# Patient Record
Sex: FEMALE | Race: WHITE | NOT HISPANIC OR LATINO | Employment: FULL TIME | URBAN - NONMETROPOLITAN AREA
[De-identification: names, ages, dates, MRNs, and addresses within clinical notes are randomized per-mention and may not be internally consistent; named-entity substitution may affect disease eponyms.]

---

## 2020-02-13 ENCOUNTER — OFFICE VISIT CONVERTED (OUTPATIENT)
Dept: FAMILY MEDICINE CLINIC | Facility: CLINIC | Age: 30
End: 2020-02-13
Attending: FAMILY MEDICINE

## 2020-03-06 ENCOUNTER — HOSPITAL ENCOUNTER (OUTPATIENT)
Dept: GENERAL RADIOLOGY | Facility: HOSPITAL | Age: 30
Discharge: HOME | End: 2020-03-06
Attending: NURSE PRACTITIONER

## 2020-03-06 ENCOUNTER — OFFICE VISIT CONVERTED (OUTPATIENT)
Dept: FAMILY MEDICINE CLINIC | Facility: CLINIC | Age: 30
End: 2020-03-06
Attending: NURSE PRACTITIONER

## 2020-03-06 LAB
ALBUMIN SERPL-MCNC: 4.5 G/DL (ref 3.5–5)
ALBUMIN/GLOB SERPL: 1.6 {RATIO} (ref 1.4–2.6)
ALP SERPL-CCNC: 43 U/L (ref 42–98)
ALT SERPL-CCNC: 14 U/L (ref 10–40)
ANION GAP SERPL CALC-SCNC: 16 MMOL/L (ref 8–19)
AST SERPL-CCNC: 15 U/L (ref 15–50)
BASOPHILS # BLD AUTO: 0.05 10*3/UL (ref 0–0.2)
BASOPHILS NFR BLD AUTO: 0.5 % (ref 0–3)
BILIRUB SERPL-MCNC: 0.22 MG/DL (ref 0.2–1.3)
BUN SERPL-MCNC: 8 MG/DL (ref 5–25)
BUN/CREAT SERPL: 13 {RATIO} (ref 6–20)
C TRACH RRNA CVX QL NAA+PROBE: NOT DETECTED
CALCIUM SERPL-MCNC: 9.6 MG/DL (ref 8.7–10.4)
CHLORIDE SERPL-SCNC: 102 MMOL/L (ref 99–111)
CHOLEST SERPL-MCNC: 219 MG/DL (ref 107–200)
CHOLEST/HDLC SERPL: 5.8 {RATIO} (ref 3–6)
CONV ABS IMM GRAN: 0.02 10*3/UL (ref 0–0.2)
CONV CO2: 24 MMOL/L (ref 22–32)
CONV HIV-1/ HIV-2: NEGATIVE
CONV IMMATURE GRAN: 0.2 % (ref 0–1.8)
CONV TOTAL PROTEIN: 7.4 G/DL (ref 6.3–8.2)
CREAT UR-MCNC: 0.63 MG/DL (ref 0.5–0.9)
DEPRECATED RDW RBC AUTO: 45.3 FL (ref 36.4–46.3)
EOSINOPHIL # BLD AUTO: 0.11 10*3/UL (ref 0–0.7)
EOSINOPHIL # BLD AUTO: 1 % (ref 0–7)
ERYTHROCYTE [DISTWIDTH] IN BLOOD BY AUTOMATED COUNT: 12.2 % (ref 11.7–14.4)
GFR SERPLBLD BASED ON 1.73 SQ M-ARVRAT: >60 ML/MIN/{1.73_M2}
GLOBULIN UR ELPH-MCNC: 2.9 G/DL (ref 2–3.5)
GLUCOSE SERPL-MCNC: 87 MG/DL (ref 65–99)
HCT VFR BLD AUTO: 41.9 % (ref 37–47)
HDLC SERPL-MCNC: 38 MG/DL (ref 40–60)
HGB BLD-MCNC: 13.7 G/DL (ref 12–16)
LDLC SERPL CALC-MCNC: 155 MG/DL (ref 70–100)
LYMPHOCYTES # BLD AUTO: 4.69 10*3/UL (ref 1–5)
LYMPHOCYTES NFR BLD AUTO: 43.7 % (ref 20–45)
MCH RBC QN AUTO: 32.8 PG (ref 27–31)
MCHC RBC AUTO-ENTMCNC: 32.7 G/DL (ref 33–37)
MCV RBC AUTO: 100.2 FL (ref 81–99)
MONOCYTES # BLD AUTO: 0.81 10*3/UL (ref 0.2–1.2)
MONOCYTES NFR BLD AUTO: 7.5 % (ref 3–10)
N GONORRHOEA DNA SPEC QL NAA+PROBE: NOT DETECTED
NEUTROPHILS # BLD AUTO: 5.05 10*3/UL (ref 2–8)
NEUTROPHILS NFR BLD AUTO: 47.1 % (ref 30–85)
NRBC CBCN: 0 % (ref 0–0.7)
OSMOLALITY SERPL CALC.SUM OF ELEC: 284 MOSM/KG (ref 273–304)
PLATELET # BLD AUTO: 292 10*3/UL (ref 130–400)
PMV BLD AUTO: 11.5 FL (ref 9.4–12.3)
POTASSIUM SERPL-SCNC: 4.1 MMOL/L (ref 3.5–5.3)
RBC # BLD AUTO: 4.18 10*6/UL (ref 4.2–5.4)
RPR SER QL: NORMAL
SODIUM SERPL-SCNC: 138 MMOL/L (ref 135–147)
TRIGL SERPL-MCNC: 130 MG/DL (ref 40–150)
TSH SERPL-ACNC: 1.78 M[IU]/L (ref 0.27–4.2)
VLDLC SERPL-MCNC: 26 MG/DL (ref 5–37)
WBC # BLD AUTO: 10.73 10*3/UL (ref 4.8–10.8)

## 2020-03-09 LAB
CONV HEPATITIS B SURFACE AG W CONFIRMATION RE: NEGATIVE
CONV HEPATITIS COMMENT: NORMAL
HBV CORE AB SER DONR QL IA: NEGATIVE
HBV CORE IGM SERPL QL IA: NEGATIVE
HBV E AB SERPL QL IA: NEGATIVE
HBV E AG SERPL QL IA: NEGATIVE
HBV SURFACE AB SER QL: REACTIVE
HCV AB S/CO SERPL IA: <0.1 S/CO RATIO (ref 0–0.9)

## 2020-03-10 LAB
CONV LAST MENSTURAL PERIOD: NORMAL
HPV HYBRID CAPTURE HIGH RISK: NORMAL
HPV I/H RISK 4 DNA CVX QL PROBE+SIG AMP: NEGATIVE
HSV1 DNA SPEC QL NAA+PROBE: NEGATIVE
HSV2 DNA SPEC QL NAA+PROBE: NEGATIVE
SPECIMEN SOURCE: NORMAL
SPECIMEN SOURCE: NORMAL
THIN PREP CVX: NORMAL

## 2021-05-15 VITALS
HEIGHT: 68 IN | DIASTOLIC BLOOD PRESSURE: 81 MMHG | WEIGHT: 189.19 LBS | HEART RATE: 102 BPM | RESPIRATION RATE: 20 BRPM | SYSTOLIC BLOOD PRESSURE: 127 MMHG | BODY MASS INDEX: 28.67 KG/M2 | OXYGEN SATURATION: 100 % | TEMPERATURE: 98.2 F

## 2021-05-15 VITALS
WEIGHT: 195 LBS | OXYGEN SATURATION: 100 % | TEMPERATURE: 98.2 F | SYSTOLIC BLOOD PRESSURE: 133 MMHG | HEART RATE: 100 BPM | HEIGHT: 68 IN | RESPIRATION RATE: 18 BRPM | DIASTOLIC BLOOD PRESSURE: 80 MMHG | BODY MASS INDEX: 29.55 KG/M2

## 2023-03-19 ENCOUNTER — APPOINTMENT (OUTPATIENT)
Dept: GENERAL RADIOLOGY | Facility: HOSPITAL | Age: 33
End: 2023-03-19
Payer: COMMERCIAL

## 2023-03-19 LAB
ALBUMIN SERPL-MCNC: 4.8 G/DL (ref 3.5–5.2)
ALBUMIN/GLOB SERPL: 1.6 G/DL
ALP SERPL-CCNC: 44 U/L (ref 39–117)
ALT SERPL W P-5'-P-CCNC: 19 U/L (ref 1–33)
ANION GAP SERPL CALCULATED.3IONS-SCNC: 10.3 MMOL/L (ref 5–15)
AST SERPL-CCNC: 17 U/L (ref 1–32)
BASOPHILS # BLD AUTO: 0.03 10*3/MM3 (ref 0–0.2)
BASOPHILS NFR BLD AUTO: 0.3 % (ref 0–1.5)
BILIRUB SERPL-MCNC: 0.3 MG/DL (ref 0–1.2)
BUN SERPL-MCNC: 9 MG/DL (ref 6–20)
BUN/CREAT SERPL: 15.5 (ref 7–25)
CALCIUM SPEC-SCNC: 9.9 MG/DL (ref 8.6–10.5)
CHLORIDE SERPL-SCNC: 102 MMOL/L (ref 98–107)
CO2 SERPL-SCNC: 25.7 MMOL/L (ref 22–29)
CREAT SERPL-MCNC: 0.58 MG/DL (ref 0.57–1)
DEPRECATED RDW RBC AUTO: 41.8 FL (ref 37–54)
EGFRCR SERPLBLD CKD-EPI 2021: 122 ML/MIN/1.73
EOSINOPHIL # BLD AUTO: 0.01 10*3/MM3 (ref 0–0.4)
EOSINOPHIL NFR BLD AUTO: 0.1 % (ref 0.3–6.2)
ERYTHROCYTE [DISTWIDTH] IN BLOOD BY AUTOMATED COUNT: 11.8 % (ref 12.3–15.4)
GLOBULIN UR ELPH-MCNC: 3 GM/DL
GLUCOSE SERPL-MCNC: 135 MG/DL (ref 65–99)
HCT VFR BLD AUTO: 40.1 % (ref 34–46.6)
HGB BLD-MCNC: 13.7 G/DL (ref 12–15.9)
HOLD SPECIMEN: NORMAL
HOLD SPECIMEN: NORMAL
IMM GRANULOCYTES # BLD AUTO: 0.01 10*3/MM3 (ref 0–0.05)
IMM GRANULOCYTES NFR BLD AUTO: 0.1 % (ref 0–0.5)
LIPASE SERPL-CCNC: 28 U/L (ref 13–60)
LYMPHOCYTES # BLD AUTO: 2.5 10*3/MM3 (ref 0.7–3.1)
LYMPHOCYTES NFR BLD AUTO: 25.4 % (ref 19.6–45.3)
MAGNESIUM SERPL-MCNC: 1.9 MG/DL (ref 1.6–2.6)
MCH RBC QN AUTO: 33.1 PG (ref 26.6–33)
MCHC RBC AUTO-ENTMCNC: 34.2 G/DL (ref 31.5–35.7)
MCV RBC AUTO: 96.9 FL (ref 79–97)
MONOCYTES # BLD AUTO: 0.76 10*3/MM3 (ref 0.1–0.9)
MONOCYTES NFR BLD AUTO: 7.7 % (ref 5–12)
NEUTROPHILS NFR BLD AUTO: 6.52 10*3/MM3 (ref 1.7–7)
NEUTROPHILS NFR BLD AUTO: 66.4 % (ref 42.7–76)
NRBC BLD AUTO-RTO: 0 /100 WBC (ref 0–0.2)
PLATELET # BLD AUTO: 333 10*3/MM3 (ref 140–450)
PMV BLD AUTO: 10.6 FL (ref 6–12)
POTASSIUM SERPL-SCNC: 4 MMOL/L (ref 3.5–5.2)
PROT SERPL-MCNC: 7.8 G/DL (ref 6–8.5)
RBC # BLD AUTO: 4.14 10*6/MM3 (ref 3.77–5.28)
SODIUM SERPL-SCNC: 138 MMOL/L (ref 136–145)
WBC NRBC COR # BLD: 9.83 10*3/MM3 (ref 3.4–10.8)
WHOLE BLOOD HOLD COAG: NORMAL
WHOLE BLOOD HOLD SPECIMEN: NORMAL

## 2023-03-19 PROCEDURE — 71045 X-RAY EXAM CHEST 1 VIEW: CPT

## 2023-03-19 PROCEDURE — 83880 ASSAY OF NATRIURETIC PEPTIDE: CPT

## 2023-03-19 PROCEDURE — 83690 ASSAY OF LIPASE: CPT

## 2023-03-19 PROCEDURE — 93010 ELECTROCARDIOGRAM REPORT: CPT | Performed by: INTERNAL MEDICINE

## 2023-03-19 PROCEDURE — 80053 COMPREHEN METABOLIC PANEL: CPT

## 2023-03-19 PROCEDURE — 83735 ASSAY OF MAGNESIUM: CPT

## 2023-03-19 PROCEDURE — 84484 ASSAY OF TROPONIN QUANT: CPT

## 2023-03-19 PROCEDURE — 36415 COLL VENOUS BLD VENIPUNCTURE: CPT | Performed by: EMERGENCY MEDICINE

## 2023-03-19 PROCEDURE — 84702 CHORIONIC GONADOTROPIN TEST: CPT | Performed by: EMERGENCY MEDICINE

## 2023-03-19 PROCEDURE — 84443 ASSAY THYROID STIM HORMONE: CPT | Performed by: NURSE PRACTITIONER

## 2023-03-19 PROCEDURE — 99283 EMERGENCY DEPT VISIT LOW MDM: CPT

## 2023-03-19 PROCEDURE — 93005 ELECTROCARDIOGRAM TRACING: CPT | Performed by: EMERGENCY MEDICINE

## 2023-03-19 PROCEDURE — 85379 FIBRIN DEGRADATION QUANT: CPT | Performed by: EMERGENCY MEDICINE

## 2023-03-19 PROCEDURE — 85025 COMPLETE CBC W/AUTO DIFF WBC: CPT | Performed by: EMERGENCY MEDICINE

## 2023-03-19 PROCEDURE — 93005 ELECTROCARDIOGRAM TRACING: CPT

## 2023-03-19 PROCEDURE — 84439 ASSAY OF FREE THYROXINE: CPT | Performed by: NURSE PRACTITIONER

## 2023-03-19 RX ORDER — ASPIRIN 81 MG/1
324 TABLET, CHEWABLE ORAL ONCE
Status: DISCONTINUED | OUTPATIENT
Start: 2023-03-19 | End: 2023-03-20 | Stop reason: HOSPADM

## 2023-03-19 RX ORDER — SODIUM CHLORIDE 0.9 % (FLUSH) 0.9 %
10 SYRINGE (ML) INJECTION AS NEEDED
Status: DISCONTINUED | OUTPATIENT
Start: 2023-03-19 | End: 2023-03-20 | Stop reason: HOSPADM

## 2023-03-20 ENCOUNTER — HOSPITAL ENCOUNTER (EMERGENCY)
Facility: HOSPITAL | Age: 33
Discharge: HOME | End: 2023-03-20
Attending: EMERGENCY MEDICINE | Admitting: EMERGENCY MEDICINE
Payer: COMMERCIAL

## 2023-03-20 VITALS
HEART RATE: 118 BPM | BODY MASS INDEX: 31.39 KG/M2 | HEIGHT: 67 IN | OXYGEN SATURATION: 100 % | WEIGHT: 200 LBS | DIASTOLIC BLOOD PRESSURE: 82 MMHG | RESPIRATION RATE: 20 BRPM | TEMPERATURE: 98.8 F | SYSTOLIC BLOOD PRESSURE: 128 MMHG

## 2023-03-20 DIAGNOSIS — R00.2 PALPITATIONS: Primary | ICD-10-CM

## 2023-03-20 LAB
D DIMER PPP FEU-MCNC: <0.27 MCGFEU/ML (ref 0–0.5)
GEN 5 2HR TROPONIN T REFLEX: <6 NG/L
HCG INTACT+B SERPL-ACNC: <0.5 MIU/ML
NT-PROBNP SERPL-MCNC: <36 PG/ML (ref 0–450)
T4 FREE SERPL-MCNC: 1.11 NG/DL (ref 0.93–1.7)
TROPONIN T DELTA: NORMAL
TROPONIN T SERPL HS-MCNC: <6 NG/L
TSH SERPL DL<=0.05 MIU/L-ACNC: 1.13 UIU/ML (ref 0.27–4.2)

## 2023-03-20 PROCEDURE — 93005 ELECTROCARDIOGRAM TRACING: CPT | Performed by: EMERGENCY MEDICINE

## 2023-03-20 PROCEDURE — 93010 ELECTROCARDIOGRAM REPORT: CPT | Performed by: INTERNAL MEDICINE

## 2023-03-20 PROCEDURE — 84484 ASSAY OF TROPONIN QUANT: CPT | Performed by: EMERGENCY MEDICINE

## 2023-03-20 RX ORDER — METOPROLOL SUCCINATE 25 MG/1
25 TABLET, EXTENDED RELEASE ORAL DAILY
Qty: 30 TABLET | Refills: 0 | Status: SHIPPED | OUTPATIENT
Start: 2023-03-20 | End: 2023-03-22

## 2023-03-20 RX ADMIN — SODIUM CHLORIDE 1000 ML: 9 INJECTION, SOLUTION INTRAVENOUS at 01:44

## 2023-03-20 NOTE — ED PROVIDER NOTES
"Time: 1:34 AM EDT  Date of encounter:  3/19/2023  Independent Historian/Clinical History and Information was obtained by:   Patient  Chief Complaint: palpitations    History is limited by: N/A    History of Present Illness:  Patient is a 32 y.o. year old female who presents to the emergency department for evaluation of intermittent palpitations.  Patient states this started yesterday.  She had several episodes of palpitation lasting for few seconds at a time.  She reports some chest discomfort with that but denies any chest pain.  No shortness of breath.  No lower extremity edema.  No history of DVT/PE.  She denies drinking any caffeinated beverages.  She denies any drug use.  No other complaints.      HPI    Patient Care Team  Primary Care Provider: Halley Albrecht DO    Past Medical History:     Allergies   Allergen Reactions   • Erythromycin Hives     No past medical history on file.  No past surgical history on file.  No family history on file.    Home Medications:  Prior to Admission medications    Not on File        Social History:          Review of Systems:  Review of Systems   Constitutional: Negative for chills and fever.   HENT: Negative for congestion, ear pain and sore throat.    Eyes: Negative for pain.   Respiratory: Negative for cough, chest tightness and shortness of breath.    Cardiovascular: Positive for palpitations. Negative for chest pain.   Gastrointestinal: Negative for abdominal pain, diarrhea, nausea and vomiting.   Genitourinary: Negative for flank pain and hematuria.   Musculoskeletal: Negative for joint swelling.   Skin: Negative for pallor.   Neurological: Negative for seizures and headaches.   All other systems reviewed and are negative.       Physical Exam:  /82 (Patient Position: Lying)   Pulse 118   Temp 98.8 °F (37.1 °C) (Oral)   Resp 20   Ht 170.2 cm (67\")   Wt 90.7 kg (200 lb)   SpO2 100%   BMI 31.32 kg/m²     Physical Exam  Constitutional:       Appearance: Normal " appearance.   HENT:      Head: Normocephalic and atraumatic.      Nose: Nose normal.      Mouth/Throat:      Mouth: Mucous membranes are moist.   Eyes:      Extraocular Movements: Extraocular movements intact.      Conjunctiva/sclera: Conjunctivae normal.      Pupils: Pupils are equal, round, and reactive to light.   Cardiovascular:      Rate and Rhythm: Regular rhythm. Tachycardia present.      Pulses: Normal pulses.      Heart sounds: Normal heart sounds.   Pulmonary:      Effort: Pulmonary effort is normal.      Breath sounds: Normal breath sounds.   Abdominal:      General: There is no distension.      Palpations: Abdomen is soft.      Tenderness: There is no abdominal tenderness.   Musculoskeletal:         General: Normal range of motion.      Cervical back: Normal range of motion.   Skin:     General: Skin is warm and dry.      Capillary Refill: Capillary refill takes less than 2 seconds.   Neurological:      General: No focal deficit present.      Mental Status: She is alert and oriented to person, place, and time. Mental status is at baseline.   Psychiatric:         Mood and Affect: Mood normal.         Behavior: Behavior normal.                  Procedures:  Procedures      Medical Decision Making:      Comorbidities that affect care:    Smoking    External Notes reviewed:    Previous Clinic Note: Patient seen by gynecology 3 years ago for annual exam      The following orders were placed and all results were independently analyzed by me:  Orders Placed This Encounter   Procedures   • XR Chest 1 View   • [place] Draw   • High Sensitivity Troponin T   • Comprehensive Metabolic Panel   • Lipase   • BNP   • Magnesium   • CBC Auto Differential   • TSH   • T4, Free   • High Sensitivity Troponin T 2Hr   • hCG, Quantitative, Pregnancy   • D-dimer, Quantitative   • ECG 12 Lead Rhythm Change   • CBC & Differential   • Green Top (Gel)   • Lavender Top   • Gold Top - SST   • Light Blue Top       Medications Given in  the Emergency Department:  Medications   sodium chloride 0.9 % bolus 1,000 mL (0 mL Intravenous Stopped 3/20/23 0253)        ED Course:    ED Course as of 03/20/23 0631   Mon Mar 20, 2023   0629 ECG 12 Lead ED Triage Standing Order; Chest Pain  Tachycardia with rate of 186.  No acute ST elevation.  Initial part of EKG showed possible SVT but this converted to sinus tachycardia no previous available for comparison.  EKG interpreted by me. [LD]   0630 ECG 12 Lead Rhythm Change  Sinus tachycardia with a rate of 112.  No acute ST elevation.  Normal SC and QTc interval.  Change when compared to previous.  EKG interpreted by me. [LD]      ED Course User Index  [LD] Kevin Calero MD       Labs:    Lab Results (last 24 hours)     Procedure Component Value Units Date/Time    High Sensitivity Troponin T [[id number]]  (Normal) Collected: 03/19/23 2255    Specimen: Blood Updated: 03/20/23 0003     HS Troponin T <6 ng/L     Narrative:      High Sensitive Troponin T Reference Range:  <10.0 ng/L- Negative Female for AMI  <15.0 ng/L- Negative Male for AMI  >=10 - Abnormal Female indicating possible myocardial injury.  >=15 - Abnormal Male indicating possible myocardial injury.   Clinicians would have to utilize clinical acumen, EKG, Troponin, and serial changes to determine if it is an Acute Myocardial Infarction or myocardial injury due to an underlying chronic condition.         CBC & Differential [[id number]]  (Abnormal) Collected: 03/19/23 2255    Specimen: Blood Updated: 03/19/23 2331    Narrative:      The following orders were created for panel order CBC & Differential.  Procedure                               Abnormality         Status                     ---------                               -----------         ------                     CBC Auto Differential[[id number]]        Abnormal            Final result                 Please view results for these tests on the individual orders.    Comprehensive Metabolic  Panel [[id number]]  (Abnormal) Collected: 03/19/23 2255    Specimen: Blood Updated: 03/19/23 2351     Glucose 135 mg/dL      BUN 9 mg/dL      Creatinine 0.58 mg/dL      Sodium 138 mmol/L      Potassium 4.0 mmol/L      Chloride 102 mmol/L      CO2 25.7 mmol/L      Calcium 9.9 mg/dL      Total Protein 7.8 g/dL      Albumin 4.8 g/dL      ALT (SGPT) 19 U/L      AST (SGOT) 17 U/L      Alkaline Phosphatase 44 U/L      Total Bilirubin 0.3 mg/dL      Globulin 3.0 gm/dL      A/G Ratio 1.6 g/dL      BUN/Creatinine Ratio 15.5     Anion Gap 10.3 mmol/L      eGFR 122.0 mL/min/1.73     Narrative:      GFR Normal >60  Chronic Kidney Disease <60  Kidney Failure <15      Lipase [[id number]]  (Normal) Collected: 03/19/23 2255    Specimen: Blood Updated: 03/19/23 2351     Lipase 28 U/L     BNP [[id number]]  (Normal) Collected: 03/19/23 2255    Specimen: Blood Updated: 03/20/23 0003     proBNP <36.0 pg/mL     Narrative:      Among patients with dyspnea, NT-proBNP is highly sensitive for the detection of acute congestive heart failure. In addition NT-proBNP of <300 pg/ml effectively rules out acute congestive heart failure with 99% negative predictive value.    Results may be falsely decreased if patient taking Biotin.      Magnesium [[id number]]  (Normal) Collected: 03/19/23 2255    Specimen: Blood Updated: 03/19/23 2351     Magnesium 1.9 mg/dL     CBC Auto Differential [[id number]]  (Abnormal) Collected: 03/19/23 2255    Specimen: Blood Updated: 03/19/23 2331     WBC 9.83 10*3/mm3      RBC 4.14 10*6/mm3      Hemoglobin 13.7 g/dL      Hematocrit 40.1 %      MCV 96.9 fL      MCH 33.1 pg      MCHC 34.2 g/dL      RDW 11.8 %      RDW-SD 41.8 fl      MPV 10.6 fL      Platelets 333 10*3/mm3      Neutrophil % 66.4 %      Lymphocyte % 25.4 %      Monocyte % 7.7 %      Eosinophil % 0.1 %      Basophil % 0.3 %      Immature Grans % 0.1 %      Neutrophils, Absolute 6.52 10*3/mm3      Lymphocytes, Absolute 2.50 10*3/mm3      Monocytes, Absolute 0.76  10*3/mm3      Eosinophils, Absolute 0.01 10*3/mm3      Basophils, Absolute 0.03 10*3/mm3      Immature Grans, Absolute 0.01 10*3/mm3      nRBC 0.0 /100 WBC     TSH [[id number]]  (Normal) Collected: 03/19/23 2255    Specimen: Blood Updated: 03/20/23 0003     TSH 1.130 uIU/mL     T4, Free [[id number]]  (Normal) Collected: 03/19/23 2255    Specimen: Blood Updated: 03/20/23 0004     Free T4 1.11 ng/dL     Narrative:      Results may be falsely increased if patient taking Biotin.      hCG, Quantitative, Pregnancy [[id number]] Collected: 03/19/23 2255    Specimen: Blood Updated: 03/20/23 0124     HCG Quantitative <0.50 mIU/mL     Narrative:      HCG Ranges by Gestational Age    Females - non-pregnant premenopausal   </= 1mIU/mL HCG  Females - postmenopausal               </= 7mIU/mL HCG    3 Weeks       5.4   -      72 mIU/mL  4 Weeks      10.2   -     708 mIU/mL  5 Weeks       217   -   8,245 mIU/mL  6 Weeks       152   -  32,177 mIU/mL  7 Weeks     4,059   - 153,767 mIU/mL  8 Weeks    31,366   - 149,094 mIU/mL  9 Weeks    59,109   - 135,901 mIU/mL  10 Weeks   44,186   - 170,409 mIU/mL  12 Weeks   27,107   - 201,615 mIU/mL  14 Weeks   24,302   -  93,646 mIU/mL  15 Weeks   12,540   -  69,747 mIU/mL  16 Weeks    8,904   -  55,332 mIU/mL  17 Weeks    8,240   -  51,793 mIU/mL  18 Weeks    9,649   -  55,271 mIU/mL      D-dimer, Quantitative [[id number]]  (Normal) Collected: 03/19/23 2255    Specimen: Blood Updated: 03/20/23 0144     D-Dimer, Quantitative <0.27 MCGFEU/mL     Narrative:      According to the assay 's published package insert, a normal (<0.50 MCGFEU/mL) D-dimer result in conjunction with a non-high clinical probability assessment, excludes deep vein thrombosis (DVT) and pulmonary embolism (PE) with high sensitivity.    D-dimer values increase with age and this can make VTE exclusion of an older population difficult. To address this, the American College [employer], based on best available evidence and  "recent guidelines, recommends that clinicians use age-adjusted D-dimer thresholds in patients greater than 50 years of age with: a) a low probability of PE who do not meet all Pulmonary Embolism Rule Out Criteria, or b) in those with intermediate probability of PE.   The formula for an age-adjusted D-dimer cut-off is \"age/100\".  For example, a 60 year old patient would have an age-adjusted cut-off of 0.60 MCGFEU/mL and an 80 year old 0.80 MCGFEU/mL.    High Sensitivity Troponin T 2Hr [[id number]] Collected: 03/20/23 0145    Specimen: Blood Updated: 03/20/23 0217     HS Troponin T <6 ng/L      Troponin T Delta --     Comment: Unable to calculate.       Narrative:      High Sensitive Troponin T Reference Range:  <10.0 ng/L- Negative Female for AMI  <15.0 ng/L- Negative Male for AMI  >=10 - Abnormal Female indicating possible myocardial injury.  >=15 - Abnormal Male indicating possible myocardial injury.   Clinicians would have to utilize clinical acumen, EKG, Troponin, and serial changes to determine if it is an Acute Myocardial Infarction or myocardial injury due to an underlying chronic condition.                Imaging:    XR Chest 1 View    Result Date: 3/20/2023  PROCEDURE: XR CHEST 1 VW  COMPARISON: None.  INDICATIONS: CHEST PAIN.  FINDINGS: A single frontal (AP or PA upright portable) chest radiograph reveals no cardiac enlargement and no acute infiltrate. No pneumothorax is seen.       No acute cardiopulmonary disease is seen radiographically.     Please note that portions of this note were completed with a voice recognition program.  HOLLIE COVARRUBIAS JR, MD       Electronically Signed and Approved By: HOLLIE COVARRUBIAS JR, MD on 3/20/2023 at 0:45                  Differential Diagnosis and Discussion:    Palpitations: Differential diagnosis includes but is not limited to anxiety, atrioventricular blocks, mitral valve disease, hypoxia, coronary artery disease, hypokalemia, anemia, fever, COPD, congestive heart " failure, pericarditis, Shaka-Parkinson-White syndrome, pulmonary embolism, SVT, atrial fibrillation, atrial flutter, sinus tachycardia, thyrotoxicosis, and pheochromocytoma.    All labs were reviewed and interpreted by me.  All X-rays were independently reviewed by me.  EKG was interpreted by me.    MDM  Number of Diagnoses or Management Options  Palpitations  Diagnosis management comments: Patient is afebrile nontoxic-appearing.  Initial vitals remarkable for tachycardia.  Initial EKG showed tachycardia with heart rate of 186.  Patient quickly went back to sinus rhythm.  Initial part of EKG concerning for SVT.  Patient denies any known history of any arrhythmias.  She does have a family history of SVT.  Patient remained in sinus rhythm the rest of the stay in the emergency department.  Repeat EKG shows sinus rhythm normal intervals no acute ST elevation.  Labs show no significant abnormality.  Discussed all these findings with the patient.  Explained that this could be SVT or other arrhythmia.  I recommend that she follows up with her primary physician for Holter monitor as well as with cardiology.  Will give a prescription for low dose of metoprolol.  Discussed return precautions, discharge instructions and answered all her questions.       Amount and/or Complexity of Data Reviewed  Clinical lab tests: reviewed  Tests in the radiology section of CPT®: reviewed  Tests in the medicine section of CPT®: reviewed  Review and summarize past medical records: yes  Independent visualization of images, tracings, or specimens: yes    Risk of Complications, Morbidity, and/or Mortality  Presenting problems: moderate  Management options: moderate             Patient Care Considerations:    CT CHEST: I considered ordering a CT scan of the chest, however O2 sat upper 90s on room air. D-dimer is negative      Consultants/Shared Management Plan:    None    Social Determinants of Health:    Patient is independent, reliable, and has  access to care.       Disposition and Care Coordination:    Discharged: I considered escalation of care by admitting this patient for observation, however the patient has improved and is suitable and  stable for discharge.    I have explained the patient´s condition, diagnoses and treatment plan based on the information available to me at this time. I have answered questions and addressed any concerns. The patient has a good  understanding of the patient´s diagnosis, condition, and treatment plan as can be expected at this point. The vital signs have been stable. The patient´s condition is stable and appropriate for discharge from the emergency department.      The patient will pursue further outpatient evaluation with the primary care physician or other designated or consulting physician as outlined in the discharge instructions. They are agreeable to this plan of care and follow-up instructions have been explained in detail. The patient has received these instructions in written format and have expressed an understanding of the discharge instructions. The patient is aware that any significant change in condition or worsening of symptoms should prompt an immediate return to this or the closest emergency department or call to 911.  I have explained discharge medications and the need for follow up with the patient/caretakers. This was also printed in the discharge instructions. Patient was discharged with the following medications and follow up:      Medication List      New Prescriptions    metoprolol succinate XL 25 MG 24 hr tablet  Commonly known as: TOPROL-XL  Take 1 tablet by mouth Daily for 30 days.           Where to Get Your Medications      These medications were sent to [id number] - [place], [place] - [address] [place] [address] AT [facility] - [phone] [facility] [phone] [address] [address]    Phone: [phone]    · metoprolol succinate XL 25 MG 24 hr tablet      Halley Albrecht DO  [address]  [facility] [address] [phone]    In 2 days      Edgar Sears MD  [address] [place] [address]  [place] [address] [phone]    Schedule an appointment as soon as possible for a visit          Final diagnoses:   Palpitations        ED Disposition     ED Disposition   Discharge    Condition   Stable    Comment   --             This medical record created using voice recognition software.           Kevin Calero MD  03/20/23 0637

## 2023-03-22 ENCOUNTER — OFFICE VISIT (OUTPATIENT)
Dept: FAMILY MEDICINE CLINIC | Facility: CLINIC | Age: 33
End: 2023-03-22
Payer: COMMERCIAL

## 2023-03-22 VITALS
OXYGEN SATURATION: 99 % | SYSTOLIC BLOOD PRESSURE: 130 MMHG | BODY MASS INDEX: 31.34 KG/M2 | HEART RATE: 111 BPM | DIASTOLIC BLOOD PRESSURE: 72 MMHG | HEIGHT: 67 IN | TEMPERATURE: 97.8 F | WEIGHT: 199.7 LBS

## 2023-03-22 DIAGNOSIS — R00.2 PALPITATIONS: ICD-10-CM

## 2023-03-22 DIAGNOSIS — R00.0 TACHYCARDIA: ICD-10-CM

## 2023-03-22 DIAGNOSIS — Z76.89 ESTABLISHING CARE WITH NEW DOCTOR, ENCOUNTER FOR: Primary | ICD-10-CM

## 2023-03-22 DIAGNOSIS — E66.9 OBESITY (BMI 30-39.9): ICD-10-CM

## 2023-03-22 PROCEDURE — 99204 OFFICE O/P NEW MOD 45 MIN: CPT | Performed by: NURSE PRACTITIONER

## 2023-03-22 RX ORDER — METOPROLOL SUCCINATE 25 MG/1
25 TABLET, EXTENDED RELEASE ORAL DAILY
COMMUNITY

## 2023-03-22 NOTE — ASSESSMENT & PLAN NOTE
Order for 24hr holter  Referral to cardiology   Advised she may begin metoprolol once holter monitoring is complete   Avoid caffeine  Proceed to ED if s/s worsen or become severe, pt agrees to POC and verbalizes understanding

## 2023-03-22 NOTE — PROGRESS NOTES
"Chief Complaint  Establish Care (Last seen in 2020 with Dr Albrecht ) and Follow-up (Pt went to ED on 3/20/2023 for heart palpitations.heart rate was running 186 on ekg.pt has not had anymore episodes since Sunday.)    Subjective        Yraely Newman presents to [facility]  History of Present Illness  New  patient/establish care:    Previous provider: Dr. Albrecht (has not been seen in 3 years +)    Lives in: [place]     /single: [employer] other   Employed: [employer]     Current medications: none   Previous labs: reviewed     Pt went to ED on 3/20/23 c/o palpitations. EKG/CXR, labs all WNL. Dx with tachycardia. Advised to FU with PCP for holter monitor. Denies any further episodes since ED visit. Today's , but states she \"gets nervous\". Denies dizziness, SOB, chest pain, HA, blurred vision, or other. Pt was prescribed metoprolol, but she has not started taking.       Reviewed all recent labs and medications.      Objective   Vital Signs:  /72   Pulse 111   Temp 97.8 °F (36.6 °C) (Temporal)   Ht 170.2 cm (67\")   Wt 90.6 kg (199 lb 11.2 oz)   SpO2 99%   BMI 31.28 kg/m²   Estimated body mass index is 31.28 kg/m² as calculated from the following:    Height as of this encounter: 170.2 cm (67\").    Weight as of this encounter: 90.6 kg (199 lb 11.2 oz).             Physical Exam  Vitals reviewed.   Constitutional:       General: She is not in acute distress.     Appearance: Normal appearance.   HENT:      Head: Normocephalic.      Right Ear: Tympanic membrane normal.      Left Ear: Tympanic membrane normal.      Nose: Nose normal.      Mouth/Throat:      Pharynx: Oropharynx is clear. No posterior oropharyngeal erythema.   Eyes:      General: No scleral icterus.     Extraocular Movements: Extraocular movements intact.      Conjunctiva/sclera: Conjunctivae normal.      Pupils: Pupils are equal, round, and reactive to light.   Cardiovascular:      Rate and " Rhythm: Regular rhythm. Tachycardia present.      Pulses: Normal pulses.      Heart sounds: Normal heart sounds.   Pulmonary:      Effort: Pulmonary effort is normal.      Breath sounds: Normal breath sounds.   Abdominal:      General: Bowel sounds are normal.      Palpations: Abdomen is soft.   Musculoskeletal:         General: Normal range of motion.      Cervical back: Neck supple.   Skin:     General: Skin is warm and dry.   Neurological:      Mental Status: She is alert and oriented to person, place, and time.   Psychiatric:         Mood and Affect: Mood normal.         Behavior: Behavior normal.         Thought Content: Thought content normal.         Judgment: Judgment normal.        Result Review :    Common labs    Common Labs 3/19/23 3/19/23    2255 2255   Glucose  135 (A)   BUN  9   Creatinine  0.58   Sodium  138   Potassium  4.0   Chloride  102   Calcium  9.9   Albumin  4.8   Total Bilirubin  0.3   Alkaline Phosphatase  44   AST (SGOT)  17   ALT (SGPT)  19   WBC 9.83    Hemoglobin 13.7    Hematocrit 40.1    Platelets 333    (A) Abnormal value            Data reviewed: Cardiology studies 3/20 EKG and Recent hospitalization notes 3/20/23 ED              Assessment and Plan   Diagnoses and all orders for this visit:    1. Establishing care with new doctor, encounter for (Primary)    2. Palpitations  Assessment & Plan:  Order for 24hr holter  Referral to cardiology   Advised she may begin metoprolol once holter monitoring is complete   Avoid caffeine  Proceed to ED if s/s worsen or become severe, pt agrees to POC and verbalizes understanding      Orders:  -     Holter monitor - 24 hour; Future  -     Ambulatory Referral to Cardiology    3. Tachycardia  Assessment & Plan:  Order for 24hr holter  Referral to cardiology   Advised she may begin metoprolol once holter monitoring is complete   Avoid caffeine  Proceed to ED if s/s worsen or become severe, pt agrees to POC and verbalizes understanding          4.  Obesity (BMI 30-39.9)  Assessment & Plan:  Patient's (Body mass index is 31.28 kg/m².) indicates that they are obese (BMI >30) with health conditions that include none . Weight is newly identified. BMI  is above average; BMI management plan is completed. We discussed portion control and increasing exercise.              Follow Up   Return if symptoms worsen or fail to improve.  Patient was given instructions and counseling regarding her condition or for health maintenance advice. Please see specific information pulled into the AVS if appropriate.

## 2023-03-26 LAB — QT INTERVAL: 335 MS

## 2023-03-27 ENCOUNTER — TELEPHONE (OUTPATIENT)
Dept: FAMILY MEDICINE CLINIC | Facility: CLINIC | Age: 33
End: 2023-03-27
Payer: COMMERCIAL

## 2023-03-27 NOTE — TELEPHONE ENCOUNTER
Caller: Yarely Newman    Relationship: Self    Best call back number: [phone]    What is the best time to reach you: AFTERNOONS     Who are you requesting to speak with (clinical staff, provider,  specific staff member):  CLINICAL          What was the call regarding:     THE PATIENT SAID THAT HER  HOLTER MONITOR IS  SCHEDULED TO BE PUT ON  5/19/23 AND PCP CLARISA WAS WANTING IT TO BE COMPLETED BY 4/5/23 PER MICKY. SHE IS WANTING TO KNOW IF THAT WILL BE OK. SHE SAID SHE HAS MEDICATION SHE HAS TO TAKE MEDICATION AFTER COMPLETING THE HOLTER MONITOR.     SHE IS WANTING A  CALL BACK TO DISCUSS.      Do you require a callback: YES

## 2023-03-28 RX ORDER — HYDROXYZINE HYDROCHLORIDE 25 MG/1
25 TABLET, FILM COATED ORAL 3 TIMES DAILY PRN
Qty: 30 TABLET | Refills: 2 | Status: SHIPPED | OUTPATIENT
Start: 2023-03-28

## 2023-03-28 NOTE — TELEPHONE ENCOUNTER
Patients holter monitor isn't scheduled to be placed until 5/19/23.  Patient reports you wanted this done by 4/5/23 according to her mychart.  Is it ok for patient to wait to have this done?

## 2023-03-28 NOTE — TELEPHONE ENCOUNTER
Patient asking does she need to start metoprolol now or after holter monitor?  She states her heart rate is not high at rest and she feels like medication will slow it down too much.  It stays in 70-80 range.  When sleeping it goes down to 48.  Patient feels like this could be anxiety related, she doesn't take anything for that currently.  Is this something you could prescribe without appt?

## 2023-03-29 LAB — QT INTERVAL: 270 MS

## 2023-03-29 NOTE — TELEPHONE ENCOUNTER
Left message for patient to hold metoprolol until she see's cardiology.  Also left message about new medication being sent and to make 1 month follow up in office.

## 2023-05-19 ENCOUNTER — HOSPITAL ENCOUNTER (OUTPATIENT)
Dept: CARDIOLOGY | Facility: HOSPITAL | Age: 33
Discharge: HOME | End: 2023-05-19
Payer: COMMERCIAL

## 2023-05-19 DIAGNOSIS — R00.2 PALPITATIONS: ICD-10-CM

## 2023-05-19 LAB
MAXIMAL PREDICTED HEART RATE: 185 BPM
STRESS TARGET HR: 157 BPM

## 2023-05-19 PROCEDURE — 93225 XTRNL ECG REC<48 HRS REC: CPT

## 2023-05-25 LAB
MAXIMAL PREDICTED HEART RATE: 185 BPM
STRESS TARGET HR: 157 BPM

## 2023-07-27 ENCOUNTER — LAB (OUTPATIENT)
Dept: LAB | Facility: HOSPITAL | Age: 33
End: 2023-07-27
Payer: COMMERCIAL

## 2023-07-27 DIAGNOSIS — F41.9 ANXIETY HYPERVENTILATION: ICD-10-CM

## 2023-07-27 DIAGNOSIS — E28.2 POLYCYSTIC OVARIES: ICD-10-CM

## 2023-07-27 DIAGNOSIS — Z13.9 SCREENING FOR UNSPECIFIED CONDITION: ICD-10-CM

## 2023-07-27 DIAGNOSIS — I47.9 PAROXYSMAL TACHYCARDIA, UNSPECIFIED: ICD-10-CM

## 2023-07-27 DIAGNOSIS — F45.8 ANXIETY HYPERVENTILATION: ICD-10-CM

## 2023-07-27 DIAGNOSIS — Z13.1 SCREENING FOR DIABETES MELLITUS: ICD-10-CM

## 2023-07-27 DIAGNOSIS — Z13.220 SCREENING FOR LIPOID DISORDERS: ICD-10-CM

## 2023-07-27 LAB
BASOPHILS # BLD AUTO: 0.02 10*3/MM3 (ref 0–0.2)
BASOPHILS NFR BLD AUTO: 0.2 % (ref 0–1.5)
DEPRECATED RDW RBC AUTO: 41.9 FL (ref 37–54)
EOSINOPHIL # BLD AUTO: 0.05 10*3/MM3 (ref 0–0.4)
EOSINOPHIL NFR BLD AUTO: 0.6 % (ref 0.3–6.2)
ERYTHROCYTE [DISTWIDTH] IN BLOOD BY AUTOMATED COUNT: 11.8 % (ref 12.3–15.4)
HBA1C MFR BLD: 5.6 % (ref 4.8–5.6)
HCT VFR BLD AUTO: 37.7 % (ref 34–46.6)
HGB BLD-MCNC: 12.8 G/DL (ref 12–15.9)
IMM GRANULOCYTES # BLD AUTO: 0.02 10*3/MM3 (ref 0–0.05)
IMM GRANULOCYTES NFR BLD AUTO: 0.2 % (ref 0–0.5)
LYMPHOCYTES # BLD AUTO: 3.12 10*3/MM3 (ref 0.7–3.1)
LYMPHOCYTES NFR BLD AUTO: 38.3 % (ref 19.6–45.3)
MCH RBC QN AUTO: 33.4 PG (ref 26.6–33)
MCHC RBC AUTO-ENTMCNC: 34 G/DL (ref 31.5–35.7)
MCV RBC AUTO: 98.4 FL (ref 79–97)
MONOCYTES # BLD AUTO: 0.75 10*3/MM3 (ref 0.1–0.9)
MONOCYTES NFR BLD AUTO: 9.2 % (ref 5–12)
NEUTROPHILS NFR BLD AUTO: 4.19 10*3/MM3 (ref 1.7–7)
NEUTROPHILS NFR BLD AUTO: 51.5 % (ref 42.7–76)
NRBC BLD AUTO-RTO: 0 /100 WBC (ref 0–0.2)
PLATELET # BLD AUTO: 307 10*3/MM3 (ref 140–450)
PMV BLD AUTO: 11 FL (ref 6–12)
RBC # BLD AUTO: 3.83 10*6/MM3 (ref 3.77–5.28)
WBC NRBC COR # BLD: 8.15 10*3/MM3 (ref 3.4–10.8)

## 2023-07-27 PROCEDURE — 82607 VITAMIN B-12: CPT

## 2023-07-27 PROCEDURE — 84439 ASSAY OF FREE THYROXINE: CPT

## 2023-07-27 PROCEDURE — 80061 LIPID PANEL: CPT

## 2023-07-27 PROCEDURE — G0432 EIA HIV-1/HIV-2 SCREEN: HCPCS

## 2023-07-27 PROCEDURE — 84443 ASSAY THYROID STIM HORMONE: CPT

## 2023-07-27 PROCEDURE — 86803 HEPATITIS C AB TEST: CPT

## 2023-07-27 PROCEDURE — 83036 HEMOGLOBIN GLYCOSYLATED A1C: CPT

## 2023-07-27 PROCEDURE — 36415 COLL VENOUS BLD VENIPUNCTURE: CPT

## 2023-07-27 PROCEDURE — 82306 VITAMIN D 25 HYDROXY: CPT

## 2023-07-27 PROCEDURE — 80053 COMPREHEN METABOLIC PANEL: CPT

## 2023-07-27 PROCEDURE — 85025 COMPLETE CBC W/AUTO DIFF WBC: CPT

## 2023-07-28 LAB
25(OH)D3 SERPL-MCNC: 19.6 NG/ML (ref 30–100)
ALBUMIN SERPL-MCNC: 4 G/DL (ref 3.5–5.2)
ALBUMIN/GLOB SERPL: 1.3 G/DL
ALP SERPL-CCNC: 40 U/L (ref 39–117)
ALT SERPL W P-5'-P-CCNC: 18 U/L (ref 1–33)
ANION GAP SERPL CALCULATED.3IONS-SCNC: 10 MMOL/L (ref 5–15)
AST SERPL-CCNC: 22 U/L (ref 1–32)
BILIRUB SERPL-MCNC: 0.3 MG/DL (ref 0–1.2)
BUN SERPL-MCNC: 8 MG/DL (ref 6–20)
BUN/CREAT SERPL: 11.6 (ref 7–25)
CALCIUM SPEC-SCNC: 9.6 MG/DL (ref 8.6–10.5)
CHLORIDE SERPL-SCNC: 103 MMOL/L (ref 98–107)
CHOLEST SERPL-MCNC: 213 MG/DL (ref 0–200)
CO2 SERPL-SCNC: 23 MMOL/L (ref 22–29)
CREAT SERPL-MCNC: 0.69 MG/DL (ref 0.57–1)
EGFRCR SERPLBLD CKD-EPI 2021: 116.2 ML/MIN/1.73
GLOBULIN UR ELPH-MCNC: 3.2 GM/DL
GLUCOSE SERPL-MCNC: 85 MG/DL (ref 65–99)
HCV AB SER DONR QL: NORMAL
HDLC SERPL-MCNC: 44 MG/DL (ref 40–60)
HIV1+2 AB SER QL: NORMAL
LDLC SERPL CALC-MCNC: 152 MG/DL (ref 0–100)
LDLC/HDLC SERPL: 3.4 {RATIO}
POTASSIUM SERPL-SCNC: 3.8 MMOL/L (ref 3.5–5.2)
PROT SERPL-MCNC: 7.2 G/DL (ref 6–8.5)
SODIUM SERPL-SCNC: 136 MMOL/L (ref 136–145)
T4 FREE SERPL-MCNC: 1.18 NG/DL (ref 0.93–1.7)
TRIGL SERPL-MCNC: 97 MG/DL (ref 0–150)
TSH SERPL DL<=0.05 MIU/L-ACNC: 1.03 UIU/ML (ref 0.27–4.2)
VIT B12 BLD-MCNC: 386 PG/ML (ref 211–946)
VLDLC SERPL-MCNC: 17 MG/DL (ref 5–40)

## 2024-03-06 ENCOUNTER — TRANSCRIBE ORDERS (OUTPATIENT)
Dept: LAB | Facility: HOSPITAL | Age: 34
End: 2024-03-06
Payer: COMMERCIAL

## 2024-03-06 DIAGNOSIS — Z00.00 ROUTINE GENERAL MEDICAL EXAMINATION AT A HEALTH CARE FACILITY: Primary | ICD-10-CM

## 2024-04-25 ENCOUNTER — LAB (OUTPATIENT)
Dept: LAB | Facility: HOSPITAL | Age: 34
End: 2024-04-25
Payer: COMMERCIAL

## 2024-04-25 DIAGNOSIS — Z00.00 ROUTINE GENERAL MEDICAL EXAMINATION AT A HEALTH CARE FACILITY: ICD-10-CM

## 2024-04-25 LAB
25(OH)D3 SERPL-MCNC: 30 NG/ML (ref 30–100)
ALBUMIN SERPL-MCNC: 4.9 G/DL (ref 3.5–5.2)
ALBUMIN/GLOB SERPL: 2 G/DL
ALP SERPL-CCNC: 34 U/L (ref 39–117)
ALT SERPL W P-5'-P-CCNC: 47 U/L (ref 1–33)
ANION GAP SERPL CALCULATED.3IONS-SCNC: 11 MMOL/L (ref 5–15)
AST SERPL-CCNC: 27 U/L (ref 1–32)
BASOPHILS # BLD AUTO: 0.04 10*3/MM3 (ref 0–0.2)
BASOPHILS NFR BLD AUTO: 0.3 % (ref 0–1.5)
BILIRUB SERPL-MCNC: 0.3 MG/DL (ref 0–1.2)
BUN SERPL-MCNC: 9 MG/DL (ref 6–20)
BUN/CREAT SERPL: 16.7 (ref 7–25)
CALCIUM SPEC-SCNC: 9.1 MG/DL (ref 8.6–10.5)
CHLORIDE SERPL-SCNC: 103 MMOL/L (ref 98–107)
CHOLEST SERPL-MCNC: 191 MG/DL (ref 0–200)
CO2 SERPL-SCNC: 23 MMOL/L (ref 22–29)
CREAT SERPL-MCNC: 0.54 MG/DL (ref 0.57–1)
DEPRECATED RDW RBC AUTO: 42.3 FL (ref 37–54)
EGFRCR SERPLBLD CKD-EPI 2021: 122.5 ML/MIN/1.73
EOSINOPHIL # BLD AUTO: 0.06 10*3/MM3 (ref 0–0.4)
EOSINOPHIL NFR BLD AUTO: 0.5 % (ref 0.3–6.2)
ERYTHROCYTE [DISTWIDTH] IN BLOOD BY AUTOMATED COUNT: 11.7 % (ref 12.3–15.4)
FOLATE SERPL-MCNC: >20 NG/ML (ref 4.78–24.2)
GLOBULIN UR ELPH-MCNC: 2.4 GM/DL
GLUCOSE SERPL-MCNC: 86 MG/DL (ref 65–99)
HBA1C MFR BLD: 5.6 % (ref 4.8–5.6)
HCT VFR BLD AUTO: 37.7 % (ref 34–46.6)
HDLC SERPL-MCNC: 60 MG/DL (ref 40–60)
HGB BLD-MCNC: 12.8 G/DL (ref 12–15.9)
IMM GRANULOCYTES # BLD AUTO: 0.03 10*3/MM3 (ref 0–0.05)
IMM GRANULOCYTES NFR BLD AUTO: 0.2 % (ref 0–0.5)
LDLC SERPL CALC-MCNC: 116 MG/DL (ref 0–100)
LDLC/HDLC SERPL: 1.91 {RATIO}
LYMPHOCYTES # BLD AUTO: 3.35 10*3/MM3 (ref 0.7–3.1)
LYMPHOCYTES NFR BLD AUTO: 27.9 % (ref 19.6–45.3)
MCH RBC QN AUTO: 33.7 PG (ref 26.6–33)
MCHC RBC AUTO-ENTMCNC: 34 G/DL (ref 31.5–35.7)
MCV RBC AUTO: 99.2 FL (ref 79–97)
MONOCYTES # BLD AUTO: 1.07 10*3/MM3 (ref 0.1–0.9)
MONOCYTES NFR BLD AUTO: 8.9 % (ref 5–12)
NEUTROPHILS NFR BLD AUTO: 62.2 % (ref 42.7–76)
NEUTROPHILS NFR BLD AUTO: 7.46 10*3/MM3 (ref 1.7–7)
NRBC BLD AUTO-RTO: 0 /100 WBC (ref 0–0.2)
PLATELET # BLD AUTO: 330 10*3/MM3 (ref 140–450)
PMV BLD AUTO: 10.5 FL (ref 6–12)
POTASSIUM SERPL-SCNC: 3.9 MMOL/L (ref 3.5–5.2)
PROT SERPL-MCNC: 7.3 G/DL (ref 6–8.5)
RBC # BLD AUTO: 3.8 10*6/MM3 (ref 3.77–5.28)
SODIUM SERPL-SCNC: 137 MMOL/L (ref 136–145)
TRIGL SERPL-MCNC: 81 MG/DL (ref 0–150)
VIT B12 BLD-MCNC: 410 PG/ML (ref 211–946)
VLDLC SERPL-MCNC: 15 MG/DL (ref 5–40)
WBC NRBC COR # BLD AUTO: 12.01 10*3/MM3 (ref 3.4–10.8)

## 2024-04-25 PROCEDURE — 80061 LIPID PANEL: CPT

## 2024-04-25 PROCEDURE — 85025 COMPLETE CBC W/AUTO DIFF WBC: CPT

## 2024-04-25 PROCEDURE — 82306 VITAMIN D 25 HYDROXY: CPT

## 2024-04-25 PROCEDURE — 80053 COMPREHEN METABOLIC PANEL: CPT

## 2024-04-25 PROCEDURE — 82746 ASSAY OF FOLIC ACID SERUM: CPT

## 2024-04-25 PROCEDURE — 82607 VITAMIN B-12: CPT

## 2024-04-25 PROCEDURE — 83036 HEMOGLOBIN GLYCOSYLATED A1C: CPT

## 2024-04-25 PROCEDURE — 36415 COLL VENOUS BLD VENIPUNCTURE: CPT

## 2024-05-01 ENCOUNTER — OFFICE VISIT (OUTPATIENT)
Dept: FAMILY MEDICINE CLINIC | Facility: CLINIC | Age: 34
End: 2024-05-01
Payer: COMMERCIAL

## 2024-05-01 VITALS
TEMPERATURE: 98.3 F | DIASTOLIC BLOOD PRESSURE: 75 MMHG | HEIGHT: 67 IN | SYSTOLIC BLOOD PRESSURE: 124 MMHG | OXYGEN SATURATION: 97 % | HEART RATE: 110 BPM | WEIGHT: 119 LBS | BODY MASS INDEX: 18.68 KG/M2

## 2024-05-01 DIAGNOSIS — Z32.00 POSSIBLE PREGNANCY: ICD-10-CM

## 2024-05-01 DIAGNOSIS — J02.9 SORETHROAT: Primary | ICD-10-CM

## 2024-05-01 DIAGNOSIS — J01.00 ACUTE NON-RECURRENT MAXILLARY SINUSITIS: ICD-10-CM

## 2024-05-01 DIAGNOSIS — R00.0 TACHYCARDIA: ICD-10-CM

## 2024-05-01 DIAGNOSIS — E66.9 OBESITY (BMI 30-39.9): ICD-10-CM

## 2024-05-01 LAB
EXPIRATION DATE: NORMAL
INTERNAL CONTROL: NORMAL
Lab: NORMAL
S PYO AG THROAT QL: NEGATIVE

## 2024-05-01 PROCEDURE — 87880 STREP A ASSAY W/OPTIC: CPT | Performed by: NURSE PRACTITIONER

## 2024-05-01 PROCEDURE — 99214 OFFICE O/P EST MOD 30 MIN: CPT | Performed by: NURSE PRACTITIONER

## 2024-05-01 RX ORDER — AMOXICILLIN 500 MG/1
500 CAPSULE ORAL 2 TIMES DAILY
Qty: 14 CAPSULE | Refills: 0 | Status: SHIPPED | OUTPATIENT
Start: 2024-05-01 | End: 2024-05-08

## 2024-05-01 NOTE — PATIENT INSTRUCTIONS
Sinus Infection, Adult  A sinus infection is soreness and swelling (inflammation) of your sinuses. Sinuses are hollow spaces in the bones around your face. They are located:  Around your eyes.  In the middle of your forehead.  Behind your nose.  In your cheekbones.  Your sinuses and nasal passages are lined with a fluid called mucus. Mucus drains out of your sinuses. Swelling can trap mucus in your sinuses. This lets germs (bacteria, virus, or fungus) grow, which leads to infection. Most of the time, this condition is caused by a virus.  What are the causes?  Allergies.  Asthma.  Germs.  Things that block your nose or sinuses.  Growths in the nose (nasal polyps).  Chemicals or irritants in the air.  A fungus. This is rare.  What increases the risk?  Having a weak body defense system (immune system).  Doing a lot of swimming or diving.  Using nasal sprays too much.  Smoking.  What are the signs or symptoms?  The main symptoms of this condition are pain and a feeling of pressure around the sinuses. Other symptoms include:  Stuffy nose (congestion). This may make it hard to breathe through your nose.  Runny nose (drainage).  Soreness, swelling, and warmth in the sinuses.  A cough that may get worse at night.  Being unable to smell and taste.  Mucus that collects in the throat or the back of the nose (postnasal drip). This may cause a sore throat or bad breath.  Being very tired (fatigued).  A fever.  How is this diagnosed?  Your symptoms.  Your medical history.  A physical exam.  Tests to find out if your condition is short-term (acute) or long-term (chronic). Your doctor may:  Check your nose for growths (polyps).  Check your sinuses using a tool that has a light on one end (endoscope).  Check for allergies or germs.  Do imaging tests, such as an MRI or CT scan.  How is this treated?  Treatment for this condition depends on the cause and whether it is short-term or long-term.  If caused by a virus, your symptoms  should go away on their own within 10 days. You may be given medicines to relieve symptoms. They include:  Medicines that shrink swollen tissue in the nose.  A spray that treats swelling of the nostrils.  Rinses that help get rid of thick mucus in your nose (nasal saline washes).  Medicines that treat allergies (antihistamines).  Over-the-counter pain relievers.  If caused by bacteria, your doctor may wait to see if you will get better without treatment. You may be given antibiotic medicine if you have:  A very bad infection.  A weak body defense system.  If caused by growths in the nose, surgery may be needed.  Follow these instructions at home:  Medicines  Take, use, or apply over-the-counter and prescription medicines only as told by your doctor. These may include nasal sprays.  If you were prescribed an antibiotic medicine, take it as told by your doctor. Do not stop taking it even if you start to feel better.  Hydrate and humidify    Drink enough water to keep your pee (urine) pale yellow.  Use a cool mist humidifier to keep the humidity level in your home above 50%.  Breathe in steam for 10-15 minutes, 3-4 times a day, or as told by your doctor. You can do this in the bathroom while a hot shower is running.  Try not to spend time in cool or dry air.  Rest  Rest as much as you can.  Sleep with your head raised (elevated).  Make sure you get enough sleep each night.  General instructions    Put a warm, moist washcloth on your face 3-4 times a day, or as often as told by your doctor.  Use nasal saline washes as often as told by your doctor.  Wash your hands often with soap and water. If you cannot use soap and water, use hand .  Do not smoke. Avoid being around people who are smoking (secondhand smoke).  Keep all follow-up visits.  Contact a doctor if:  You have a fever.  Your symptoms get worse.  Your symptoms do not get better within 10 days.  Get help right away if:  You have a very bad headache.  You  cannot stop vomiting.  You have very bad pain or swelling around your face or eyes.  You have trouble seeing.  You feel confused.  Your neck is stiff.  You have trouble breathing.  These symptoms may be an emergency. Get help right away. Call 911.  Do not wait to see if the symptoms will go away.  Do not drive yourself to the hospital.  Summary  A sinus infection is swelling of your sinuses. Sinuses are hollow spaces in the bones around your face.  This condition is caused by tissues in your nose that become inflamed or swollen. This traps germs. These can lead to infection.  If you were prescribed an antibiotic medicine, take it as told by your doctor. Do not stop taking it even if you start to feel better.  Keep all follow-up visits.  This information is not intended to replace advice given to you by your health care provider. Make sure you discuss any questions you have with your health care provider.  Document Revised: 11/22/2022 Document Reviewed: 11/22/2022  [email or web address] Patient Education © 2024 [employer]

## 2024-05-01 NOTE — ASSESSMENT & PLAN NOTE
Patient's (Body mass index is 18.64 kg/m².) indicates that they are obese (BMI >30) with health conditions that include none . Weight is newly identified. BMI  is above average; BMI management plan is completed. We discussed portion control and increasing exercise.

## 2024-05-01 NOTE — ASSESSMENT & PLAN NOTE
Pt with Iphone information of average daily HR of 78  Advised to CTM  Denies chest pain, palp, SOB, dizziness, or other

## 2024-05-01 NOTE — ASSESSMENT & PLAN NOTE
Order for Hcg qual and ed  Adv may take Tyl PRN other consult with office before taking any OTC medications   Avoid ETOH and nicotine use

## 2024-05-01 NOTE — PROGRESS NOTES
"Chief Complaint  sinus pressure  (Symptoms started last friday), Sore Throat, and positive pregnancy test  (Pt states she has had three positive pregnancy tests on 4/20/2024)    Subjective        Yarely Newman presents to [facility]  History of Present Illness  Pt presents with positive urine preg test x 2. Pt was not actively trying to conceive, but happy. No current birth control.   LMP 3/12/24.     Pt c/o sinus pain pressure, tooth pain bilat, HA, sore throat, nasal congestion x  6 days. Tested for covid at work which was negative. No known sick contacts. Denies fever, chills, SOB, chest pain, n/v/d or other.     Reviewed all recent labs and medications.      Objective   Vital Signs:  /75 (BP Location: Right arm, Patient Position: Sitting, Cuff Size: Adult)   Pulse 110   Temp 98.3 °F (36.8 °C) (Oral)   Ht 170.2 cm (67\")   Wt 54 kg (119 lb)   SpO2 97%   BMI 18.64 kg/m²   Estimated body mass index is 18.64 kg/m² as calculated from the following:    Height as of this encounter: 170.2 cm (67\").    Weight as of this encounter: 54 kg (119 lb).             Physical Exam  Vitals reviewed.   Constitutional:       General: She is not in acute distress.     Appearance: Normal appearance.   HENT:      Head: Normocephalic.      Right Ear: Tympanic membrane normal.      Left Ear: Tympanic membrane normal.      Nose: Nose normal. Congestion present.      Right Sinus: Maxillary sinus tenderness present.      Left Sinus: Maxillary sinus tenderness present.      Mouth/Throat:      Pharynx: Oropharynx is clear. Posterior oropharyngeal erythema present.   Eyes:      General: No scleral icterus.     Extraocular Movements: Extraocular movements intact.      Conjunctiva/sclera: Conjunctivae normal.      Pupils: Pupils are equal, round, and reactive to light.   Cardiovascular:      Rate and Rhythm: Normal rate and regular rhythm.      Pulses: Normal pulses.      Heart sounds: Normal heart " sounds.   Pulmonary:      Effort: Pulmonary effort is normal.      Breath sounds: Normal breath sounds.   Abdominal:      General: Bowel sounds are normal.      Palpations: Abdomen is soft.   Musculoskeletal:         General: Normal range of motion.      Cervical back: Neck supple.   Skin:     General: Skin is warm and dry.   Neurological:      Mental Status: She is alert and oriented to person, place, and time.   Psychiatric:         Mood and Affect: Mood normal.         Behavior: Behavior normal.         Thought Content: Thought content normal.         Judgment: Judgment normal.        Result Review :      Common labs          7/27/2023    13:19 4/25/2024    09:32   Common Labs   Glucose 85  86    BUN 8  9    Creatinine 0.69  0.54    Sodium 136  137    Potassium 3.8  3.9    Chloride 103  103    Calcium 9.6  9.1    Albumin 4.0  4.9    Total Bilirubin 0.3  0.3    Alkaline Phosphatase 40  34    AST (SGOT) 22  27    ALT (SGPT) 18  47    WBC 8.15  12.01    Hemoglobin 12.8  12.8    Hematocrit 37.7  37.7    Platelets 307  330    Total Cholesterol 213  191    Triglycerides 97  81    HDL Cholesterol 44  60    LDL Cholesterol  152  116    Hemoglobin A1C 5.60  5.60                   Assessment and Plan     Diagnoses and all orders for this visit:    1. Sorethroat (Primary)  Comments:  salt water gargles TID prn  Orders:  -     POCT rapid strep A    2. Possible pregnancy  Assessment & Plan:  Order for Hcg qual and ed  Adv may take Tyl PRN other consult with office before taking any OTC medications   Avoid ETOH and nicotine use    Orders:  -     hCG, Serum, Qualitative; Future  -     hCG, Quantitative, Pregnancy; Future    3. Obesity (BMI 30-39.9)  Assessment & Plan:  Patient's (Body mass index is 18.64 kg/m².) indicates that they are obese (BMI >30) with health conditions that include none . Weight is newly identified. BMI  is above average; BMI management plan is completed. We discussed portion control and increasing  exercise.       4. Tachycardia  Assessment & Plan:  Pt with Iphone information of average daily HR of 78  Advised to CTM  Denies chest pain, palp, SOB, dizziness, or other      5. Acute non-recurrent maxillary sinusitis  Comments:  amox 500mg PO bid x 7 days   increase rest/clear fluids    Other orders  -     amoxicillin (AMOXIL) 500 MG capsule; Take 1 capsule by mouth 2 (Two) Times a Day for 7 days.  Dispense: 14 capsule; Refill: 0             Follow Up     Return if symptoms worsen or fail to improve.  Patient was given instructions and counseling regarding her condition or for health maintenance advice. Please see specific information pulled into the AVS if appropriate.         Answers submitted by the patient for this visit:  Other (Submitted on 4/24/2024)  Please describe your symptoms.: Positive pregnancy test  Have you had these symptoms before?: Yes  How long have you been having these symptoms?: Greater than 2 weeks  Primary Reason for Visit (Submitted on 4/24/2024)  What is the primary reason for your visit?: Other

## 2024-05-02 ENCOUNTER — PATIENT ROUNDING (BHMG ONLY) (OUTPATIENT)
Dept: FAMILY MEDICINE CLINIC | Facility: CLINIC | Age: 34
End: 2024-05-02
Payer: COMMERCIAL

## 2024-05-02 ENCOUNTER — LAB (OUTPATIENT)
Dept: LAB | Facility: HOSPITAL | Age: 34
End: 2024-05-02
Payer: COMMERCIAL

## 2024-05-02 DIAGNOSIS — Z32.00 POSSIBLE PREGNANCY: ICD-10-CM

## 2024-05-02 LAB
HCG INTACT+B SERPL-ACNC: NORMAL MIU/ML
HCG SERPL QL: POSITIVE

## 2024-05-02 PROCEDURE — 84702 CHORIONIC GONADOTROPIN TEST: CPT

## 2024-05-02 PROCEDURE — 36415 COLL VENOUS BLD VENIPUNCTURE: CPT

## 2024-05-02 PROCEDURE — 84703 CHORIONIC GONADOTROPIN ASSAY: CPT

## 2024-05-06 ENCOUNTER — TELEPHONE (OUTPATIENT)
Dept: OBSTETRICS AND GYNECOLOGY | Facility: CLINIC | Age: 34
End: 2024-05-06
Payer: COMMERCIAL

## 2024-05-06 DIAGNOSIS — O36.80X0 PREGNANCY WITH UNCERTAIN FETAL VIABILITY, SINGLE OR UNSPECIFIED FETUS: Primary | ICD-10-CM

## 2024-06-04 NOTE — PROGRESS NOTES
"NEW PATIENT GYN VISIT    Chief Complaint   Patient presents with    New Patient     Ultrasound for viability/Confirmation of pregnancy       HPI:   34 y.o. LMP: Patient's last menstrual period was 2024 (exact date).  Patient presents today for confirmation of pregnancy.  Denies any vaginal bleeding or cramping.  She states that she does have some nausea but no vomiting.  She does admit to insomnia.  No other complaints today.    Social History     Substance and Sexual Activity   Sexual Activity Yes    Partners: Male    Birth control/protection: None         History: PMHx, Meds, Allergies, PSHx, Social Hx, and POBHx all reviewed and updated.  Last Completed Pap Smear       This patient has no relevant Health Maintenance data.            PHYSICAL EXAM:  /83   Pulse 111   Ht 172.7 cm (68\")   Wt 89.4 kg (197 lb)   LMP 2024 (Exact Date)   Breastfeeding No   BMI 29.95 kg/m²   General- NAD, alert and oriented, appropriate  Psych- Normal mood  Respiratory- No labored breathing  Abdomen- Soft, non distended, non tender  Extremities- No edema  Skin- No lesions, no rashes      ASSESSMENT AND PLAN:  Diagnoses and all orders for this visit:    1. Encounter to determine fetal viability of pregnancy, single or unspecified fetus (Primary)    Viability ultrasound reviewed showing live intrauterine pregnancy at 12 weeks 2 days gestation consistent with LMP, SANDRA 2024  Continue prenatal vitamins  Follow-up for new OB visit in 2 weeks  FIRST TRIMESTER precautions provided- return to office/ER for pain and/or vaginal bleeding.      Follow Up:  Return in about 2 weeks (around 2024) for Routine OB visit, New OB.        Kristal Torres DO  2024    [facility]  [address] [place] [address]  Dept: [phone]  Dept Fax: [phone]  Loc: [phone]  Loc Fax: [phone]     "

## 2024-06-06 ENCOUNTER — OFFICE VISIT (OUTPATIENT)
Dept: OBSTETRICS AND GYNECOLOGY | Facility: CLINIC | Age: 34
End: 2024-06-06
Payer: COMMERCIAL

## 2024-06-06 VITALS
WEIGHT: 197 LBS | BODY MASS INDEX: 29.86 KG/M2 | HEART RATE: 111 BPM | HEIGHT: 68 IN | SYSTOLIC BLOOD PRESSURE: 119 MMHG | DIASTOLIC BLOOD PRESSURE: 83 MMHG

## 2024-06-06 DIAGNOSIS — O36.80X0 ENCOUNTER TO DETERMINE FETAL VIABILITY OF PREGNANCY, SINGLE OR UNSPECIFIED FETUS: Primary | ICD-10-CM

## 2024-06-06 RX ORDER — UREA 10 %
500 LOTION (ML) TOPICAL DAILY
COMMUNITY

## 2024-06-06 RX ORDER — PRENATAL VIT NO.126/IRON/FOLIC 28MG-0.8MG
TABLET ORAL DAILY
COMMUNITY

## 2024-06-06 RX ORDER — MELATONIN
1000 2 TIMES DAILY
COMMUNITY

## 2024-06-10 ENCOUNTER — TELEPHONE (OUTPATIENT)
Dept: OBSTETRICS AND GYNECOLOGY | Facility: CLINIC | Age: 34
End: 2024-06-10
Payer: COMMERCIAL

## 2024-06-27 ENCOUNTER — INITIAL PRENATAL (OUTPATIENT)
Dept: OBSTETRICS AND GYNECOLOGY | Facility: CLINIC | Age: 34
End: 2024-06-27
Payer: COMMERCIAL

## 2024-06-27 VITALS — SYSTOLIC BLOOD PRESSURE: 110 MMHG | WEIGHT: 199 LBS | DIASTOLIC BLOOD PRESSURE: 72 MMHG | BODY MASS INDEX: 30.26 KG/M2

## 2024-06-27 DIAGNOSIS — E55.9 VITAMIN D DEFICIENCY: ICD-10-CM

## 2024-06-27 DIAGNOSIS — O09.299 HX OF GESTATIONAL DIABETES IN PRIOR PREGNANCY, CURRENTLY PREGNANT: ICD-10-CM

## 2024-06-27 DIAGNOSIS — Z34.80 SUPERVISION OF OTHER NORMAL PREGNANCY, ANTEPARTUM: Primary | ICD-10-CM

## 2024-06-27 DIAGNOSIS — Z86.32 HX OF GESTATIONAL DIABETES IN PRIOR PREGNANCY, CURRENTLY PREGNANT: ICD-10-CM

## 2024-06-27 DIAGNOSIS — O09.529 ANTEPARTUM MULTIGRAVIDA OF ADVANCED MATERNAL AGE: ICD-10-CM

## 2024-06-27 DIAGNOSIS — Z98.891 H/O CESAREAN SECTION: ICD-10-CM

## 2024-06-27 PROBLEM — Z32.00 POSSIBLE PREGNANCY: Status: RESOLVED | Noted: 2024-05-01 | Resolved: 2024-06-27

## 2024-06-27 LAB
25(OH)D3 SERPL-MCNC: 33 NG/ML (ref 30–100)
ABO GROUP BLD: NORMAL
ALBUMIN SERPL-MCNC: 4.1 G/DL (ref 3.5–5.2)
ALBUMIN/GLOB SERPL: 1.3 G/DL
ALP SERPL-CCNC: 34 U/L (ref 39–117)
ALT SERPL W P-5'-P-CCNC: 23 U/L (ref 1–33)
AMPHET+METHAMPHET UR QL: NEGATIVE
ANION GAP SERPL CALCULATED.3IONS-SCNC: 11.4 MMOL/L (ref 5–15)
AST SERPL-CCNC: 20 U/L (ref 1–32)
BARBITURATES UR QL SCN: NEGATIVE
BASOPHILS # BLD AUTO: 0.02 10*3/MM3 (ref 0–0.2)
BASOPHILS NFR BLD AUTO: 0.2 % (ref 0–1.5)
BENZODIAZ UR QL SCN: NEGATIVE
BILIRUB SERPL-MCNC: <0.2 MG/DL (ref 0–1.2)
BLD GP AB SCN SERPL QL: NEGATIVE
BUN SERPL-MCNC: 8 MG/DL (ref 6–20)
BUN/CREAT SERPL: 17.8 (ref 7–25)
CALCIUM SPEC-SCNC: 9.6 MG/DL (ref 8.6–10.5)
CANNABINOIDS SERPL QL: NEGATIVE
CHLORIDE SERPL-SCNC: 103 MMOL/L (ref 98–107)
CO2 SERPL-SCNC: 20.6 MMOL/L (ref 22–29)
COCAINE UR QL: NEGATIVE
CREAT SERPL-MCNC: 0.45 MG/DL (ref 0.57–1)
DEPRECATED RDW RBC AUTO: 40.3 FL (ref 37–54)
EGFRCR SERPLBLD CKD-EPI 2021: 128 ML/MIN/1.73
EOSINOPHIL # BLD AUTO: 0.04 10*3/MM3 (ref 0–0.4)
EOSINOPHIL NFR BLD AUTO: 0.4 % (ref 0.3–6.2)
ERYTHROCYTE [DISTWIDTH] IN BLOOD BY AUTOMATED COUNT: 11.2 % (ref 12.3–15.4)
FENTANYL UR-MCNC: NEGATIVE NG/ML
GLOBULIN UR ELPH-MCNC: 3.1 GM/DL
GLUCOSE SERPL-MCNC: 88 MG/DL (ref 65–99)
GLUCOSE UR STRIP-MCNC: NEGATIVE MG/DL
HBA1C MFR BLD: 5.5 % (ref 4.8–5.6)
HBV SURFACE AG SERPL QL IA: NORMAL
HCT VFR BLD AUTO: 35.9 % (ref 34–46.6)
HCV AB SER QL: NORMAL
HGB BLD-MCNC: 12.1 G/DL (ref 12–15.9)
HIV 1+2 AB+HIV1 P24 AG SERPL QL IA: NORMAL
IMM GRANULOCYTES # BLD AUTO: 0.03 10*3/MM3 (ref 0–0.05)
IMM GRANULOCYTES NFR BLD AUTO: 0.3 % (ref 0–0.5)
LYMPHOCYTES # BLD AUTO: 1.91 10*3/MM3 (ref 0.7–3.1)
LYMPHOCYTES NFR BLD AUTO: 18.5 % (ref 19.6–45.3)
MCH RBC QN AUTO: 33.4 PG (ref 26.6–33)
MCHC RBC AUTO-ENTMCNC: 33.7 G/DL (ref 31.5–35.7)
MCV RBC AUTO: 99.2 FL (ref 79–97)
METHADONE UR QL SCN: NEGATIVE
MONOCYTES # BLD AUTO: 0.77 10*3/MM3 (ref 0.1–0.9)
MONOCYTES NFR BLD AUTO: 7.5 % (ref 5–12)
NEUTROPHILS NFR BLD AUTO: 7.55 10*3/MM3 (ref 1.7–7)
NEUTROPHILS NFR BLD AUTO: 73.1 % (ref 42.7–76)
NRBC BLD AUTO-RTO: 0 /100 WBC (ref 0–0.2)
OPIATES UR QL: NEGATIVE
OXYCODONE UR QL SCN: NEGATIVE
PLATELET # BLD AUTO: 279 10*3/MM3 (ref 140–450)
PMV BLD AUTO: 11.3 FL (ref 6–12)
POTASSIUM SERPL-SCNC: 3.6 MMOL/L (ref 3.5–5.2)
PROT SERPL-MCNC: 7.2 G/DL (ref 6–8.5)
PROT UR STRIP-MCNC: NEGATIVE MG/DL
RBC # BLD AUTO: 3.62 10*6/MM3 (ref 3.77–5.28)
RH BLD: POSITIVE
SODIUM SERPL-SCNC: 135 MMOL/L (ref 136–145)
T PALLIDUM IGG SER QL: NORMAL
VIT B12 BLD-MCNC: 406 PG/ML (ref 211–946)
WBC NRBC COR # BLD AUTO: 10.32 10*3/MM3 (ref 3.4–10.8)

## 2024-06-27 PROCEDURE — 87591 N.GONORRHOEAE DNA AMP PROB: CPT | Performed by: STUDENT IN AN ORGANIZED HEALTH CARE EDUCATION/TRAINING PROGRAM

## 2024-06-27 PROCEDURE — 80307 DRUG TEST PRSMV CHEM ANLYZR: CPT | Performed by: STUDENT IN AN ORGANIZED HEALTH CARE EDUCATION/TRAINING PROGRAM

## 2024-06-27 PROCEDURE — 86900 BLOOD TYPING SEROLOGIC ABO: CPT | Performed by: STUDENT IN AN ORGANIZED HEALTH CARE EDUCATION/TRAINING PROGRAM

## 2024-06-27 PROCEDURE — G0123 SCREEN CERV/VAG THIN LAYER: HCPCS | Performed by: STUDENT IN AN ORGANIZED HEALTH CARE EDUCATION/TRAINING PROGRAM

## 2024-06-27 PROCEDURE — 87491 CHLMYD TRACH DNA AMP PROBE: CPT | Performed by: STUDENT IN AN ORGANIZED HEALTH CARE EDUCATION/TRAINING PROGRAM

## 2024-06-27 PROCEDURE — 83036 HEMOGLOBIN GLYCOSYLATED A1C: CPT | Performed by: STUDENT IN AN ORGANIZED HEALTH CARE EDUCATION/TRAINING PROGRAM

## 2024-06-27 PROCEDURE — 87086 URINE CULTURE/COLONY COUNT: CPT | Performed by: STUDENT IN AN ORGANIZED HEALTH CARE EDUCATION/TRAINING PROGRAM

## 2024-06-27 PROCEDURE — 86850 RBC ANTIBODY SCREEN: CPT | Performed by: STUDENT IN AN ORGANIZED HEALTH CARE EDUCATION/TRAINING PROGRAM

## 2024-06-27 PROCEDURE — 86762 RUBELLA ANTIBODY: CPT | Performed by: STUDENT IN AN ORGANIZED HEALTH CARE EDUCATION/TRAINING PROGRAM

## 2024-06-27 PROCEDURE — 82306 VITAMIN D 25 HYDROXY: CPT | Performed by: STUDENT IN AN ORGANIZED HEALTH CARE EDUCATION/TRAINING PROGRAM

## 2024-06-27 PROCEDURE — 85025 COMPLETE CBC W/AUTO DIFF WBC: CPT | Performed by: STUDENT IN AN ORGANIZED HEALTH CARE EDUCATION/TRAINING PROGRAM

## 2024-06-27 PROCEDURE — 86803 HEPATITIS C AB TEST: CPT | Performed by: STUDENT IN AN ORGANIZED HEALTH CARE EDUCATION/TRAINING PROGRAM

## 2024-06-27 PROCEDURE — G0432 EIA HIV-1/HIV-2 SCREEN: HCPCS | Performed by: STUDENT IN AN ORGANIZED HEALTH CARE EDUCATION/TRAINING PROGRAM

## 2024-06-27 PROCEDURE — 87340 HEPATITIS B SURFACE AG IA: CPT | Performed by: STUDENT IN AN ORGANIZED HEALTH CARE EDUCATION/TRAINING PROGRAM

## 2024-06-27 PROCEDURE — 82607 VITAMIN B-12: CPT | Performed by: STUDENT IN AN ORGANIZED HEALTH CARE EDUCATION/TRAINING PROGRAM

## 2024-06-27 PROCEDURE — 87624 HPV HI-RISK TYP POOLED RSLT: CPT | Performed by: STUDENT IN AN ORGANIZED HEALTH CARE EDUCATION/TRAINING PROGRAM

## 2024-06-27 PROCEDURE — 80053 COMPREHEN METABOLIC PANEL: CPT | Performed by: STUDENT IN AN ORGANIZED HEALTH CARE EDUCATION/TRAINING PROGRAM

## 2024-06-27 PROCEDURE — 86780 TREPONEMA PALLIDUM: CPT | Performed by: STUDENT IN AN ORGANIZED HEALTH CARE EDUCATION/TRAINING PROGRAM

## 2024-06-27 PROCEDURE — 86901 BLOOD TYPING SEROLOGIC RH(D): CPT | Performed by: STUDENT IN AN ORGANIZED HEALTH CARE EDUCATION/TRAINING PROGRAM

## 2024-06-27 PROCEDURE — 99214 OFFICE O/P EST MOD 30 MIN: CPT | Performed by: STUDENT IN AN ORGANIZED HEALTH CARE EDUCATION/TRAINING PROGRAM

## 2024-06-27 PROCEDURE — 87661 TRICHOMONAS VAGINALIS AMPLIF: CPT | Performed by: STUDENT IN AN ORGANIZED HEALTH CARE EDUCATION/TRAINING PROGRAM

## 2024-06-27 NOTE — PROGRESS NOTES
NEW OBSTETRIC HISTORY AND PHYSICAL     Subjective:  Yarely Newman is a 34 y.o.  at 15w2d here for her new OB visit. Patient pregnancy is dated by a LMP consistent with 12wk US. She is taking her prenatal vitamins.Reports no loss of fluid or vaginal bleeding.No hx of genetic, bleeding, endocrine, chromosome disorder in both patient and partner. No history of multiple gestations, congenital anomalies or mental retardation.    Current feelings about pregnancy: excited  Current medications: PNV  Previous pregnancy hx: c/s due to failure to descend  Abdominal surgeries: c/s  Tobacco, alcohol, illlicit drugs:  denies  Hx of STIs including Herpes: denies  Hx of abnormal PAP smear: PAP done today  Personal Family Hx of Br, uterine, ovarian or colon cancer: denies    Discussed adequate water intake, food guidelines/weight gain, limit caffiene to less than 200mg daily.   Discussed food, activities to avoid. Discussed seatbelt safety.   Reviewed safe meds in pregnancy handout.  Taking PNV: yes  Smoking cessation needed: no    Reviewed and updated:  OBHx, GYNHx (STDs), PMHx, Medications, Allergies, PSHx, Social Hx, Preventative Hx (PAP), Hx of abuse/safe environment, Vaccine Hx including hx of chickenpox or vaccine, Genetic Hx (pt, FOB, both families).        OB History    Para Term  AB Living   3 1 1   1 1   SAB IAB Ectopic Molar Multiple Live Births             1      # Outcome Date GA Lbr Phu/2nd Weight Sex Type Anes PTL Lv   3 Current            2 Term 2012    M CS-Unspec   SHAYY   1 AB              Past Medical History:   Diagnosis Date    Abnormal ECG     Anxiety     Arrhythmia     Heart palpitations     Hyperlipidemia     Migraine     Polycystic ovary syndrome      Past Surgical History:   Procedure Laterality Date    ANKLE SURGERY Left      SECTION       Family History   Problem Relation Age of Onset    Mental illness Father     Drug abuse Father     Alcohol abuse Father     Depression Father      Anxiety disorder Father     Hyperlipidemia Father     Hypertension Father     Diabetes Father     Liver disease Mother     Heart disease Mother     Depression Mother     Anxiety disorder Mother     Hyperlipidemia Mother     Hypertension Mother     Diabetes Mother     Alcohol abuse Mother     Drug abuse Mother     Breast cancer Sister     Mental illness Sister     Thyroid disease Maternal Aunt     Hyperlipidemia Maternal Aunt     Hypertension Maternal Aunt     Hyperlipidemia Maternal Aunt     Hypertension Maternal Aunt     Ovarian cancer Neg Hx     Uterine cancer Neg Hx     Colon cancer Neg Hx     Deep vein thrombosis Neg Hx     Pulmonary embolism Neg Hx      Allergies   Allergen Reactions    Erythromycin Hives     Social History     Socioeconomic History    Marital status: Single   Tobacco Use    Smoking status: Former     Current packs/day: 0.00     Average packs/day: 1 pack/day for 15.0 years (15.0 ttl pk-yrs)     Types: Cigarettes     Start date: 2006     Quit date: 2021     Years since quitting: 3.4    Smokeless tobacco: Never   Vaping Use    Vaping status: Every Day    Substances: Nicotine, Flavoring    Devices: Disposable   Substance and Sexual Activity    Alcohol use: Not Currently    Drug use: Never     Types: Marijuana    Sexual activity: Yes     Partners: Male     Birth control/protection: None     Last Completed Pap Smear       This patient has no relevant Health Maintenance data.            ROS:   General ROS: negative for - chills or fatigue  Gastrointestinal ROS: negative for - abdominal pain or appetite loss    Objective:  Physical Exam:    Vitals:    06/27/24 1502   BP: 110/72       General appearance - alert, well appearing, and in no distress  Respiratory- No labored breathing  Breasts- Deferred to annual  Abdomen- Soft, Gravid uterus, non-tender  Extremeties: Normal ROM, Negative swelling     Pelvic exam with chaperone present:   External genitalia- Normal, no lesions  Urethra- Normal, no  masses, non tender  Vagina- Normal, no discharge  Bladder- Normal, no masses, non tender  Cervix- Normal, no lesions, no discharge, no CMT  Uterus- Normal shape and consistency, non tender.    Adnexa- Normal, no mass, non tender    Bedside Ultrasound:     Counseling:   Nutrition discussed, calories, activity/exercise in pregnancy  Discussed dietary restrictions/safety food preparation in pregnancy  Reviewed what to expect prenatal visits, office providers (female and male) and covering [facility]/Dr. Medina  Appropriate trimester precautions provided, N/V, vag bleeding, cramping  Questions answered  Discussed healthy weight gain.  Also discussed increased water intake, 200mg of caffeine daily, exercise and healthy eating habits.  Encouraged patient to consider breastfeeding. Discussed that it is recommended by the CDC and WHO that women exclusively breastfeed for the first 6 months and continue to breastfeed up to one year. Discussed the health benefits of breastfeeding, including increased immune systems in infants, reduced risk of food allergies, and reduced risk of chronic disease as an adult. Maternal benefits include more PP weight loss, reduced risk of PP depression, breast/ovarian cancer risk reduced.     ASSESSMENT/PLAN:   Diagnoses and all orders for this visit:    1. Supervision of other normal pregnancy, antepartum (Primary)  Assessment & Plan:  SANDRA finalized: 12/17/2024 by LMP and 12wk US     Genetic testing (NIPS-Quad)/CF/AFP:  declined     COVID: recommended  Flu: recommended  Tdap: 27-36 weeks     Rhogam: Apos  ?Sterilization:     Anatomy US: ordered  FU US:     PROBLEM LIST/PLAN:   Hx of C/S- recommend repeat  Hx of GDM- checking BG 4x daily  AMA       Orders:  -     POC Urinalysis Dipstick  -     Urine Drug Screen - Urine, Clean Catch; Future  -     Urine Culture - Urine, Urine, Clean Catch  -     Cancel: GP,CTNG,APTIMA HPV  -     Hemoglobin A1c; Future  -     Hemoglobinopathy Fractionation  Cascade  -     OB Panel With HIV; Future  -     US Ob 14 + Weeks Single or First Gestation; Future  -     Comprehensive Metabolic Panel; Future  -     Vitamin D,25-Hydroxy  -     Vitamin B12  -     Hemoglobin A1c  -     OB Panel With HIV  -     Comprehensive Metabolic Panel  -     IGP,CtNgTv,Apt HPV  -     Urine Drug Screen - Urine, Clean Catch    2. Vitamin D deficiency  -     Vitamin D,25-Hydroxy    3. Antepartum multigravida of advanced maternal age    4. H/O  section    5. Hx of gestational diabetes in prior pregnancy, currently pregnant  Assessment & Plan:  GDMA1 in prior pregnancy  Starting checking BG already due to concerns of increased blood sugars. States fastings are elevated. Recommend continuing to check BG            Return in about 4 weeks (around 2024) for Routine OB visit.    We have gone over the expected prenatal care to include the timing and content of visits including the anatomy ultrasound.  We discussed the content of the anatomy ultrasound and its limitations (the fact that ultrasound in general may not see up to 40% of abnormalities).  I informed her how to contact the office and/or on call person in the event of any problems and encouraged her to do so when she feels it is necessary.  We then spent time answering her questions which she indicated were answered to her satisfaction.    Kristal Torres,   2024 20:14 EDT

## 2024-06-28 NOTE — ASSESSMENT & PLAN NOTE
SANDRA finalized: 12/17/2024 by LMP and 12wk US     Genetic testing (NIPS-Quad)/CF/AFP:  declined     COVID: recommended  Flu: recommended  Tdap: 27-36 weeks     Rhogam: Apos  ?Sterilization:     Anatomy US: ordered  FU US:     PROBLEM LIST/PLAN:   Hx of C/S- recommend repeat  Hx of GDM- checking BG 4x daily  AMA

## 2024-06-28 NOTE — ASSESSMENT & PLAN NOTE
GDMA1 in prior pregnancy  Starting checking BG already due to concerns of increased blood sugars. States fastings are elevated. Recommend continuing to check BG

## 2024-06-29 LAB
BACTERIA SPEC AEROBE CULT: NO GROWTH
RUBV IGG SERPL IA-ACNC: 1.05 INDEX

## 2024-07-01 LAB
HGB A MFR BLD ELPH: 97.2 % (ref 96.4–98.8)
HGB A2 MFR BLD ELPH: 2.8 % (ref 1.8–3.2)
HGB F MFR BLD ELPH: 0 % (ref 0–2)
HGB FRACT BLD-IMP: NORMAL
HGB S MFR BLD ELPH: 0 %

## 2024-07-02 LAB
C TRACH RRNA CVX QL NAA+PROBE: NEGATIVE
CYTOLOGIST CVX/VAG CYTO: NORMAL
CYTOLOGY CVX/VAG DOC CYTO: NORMAL
CYTOLOGY CVX/VAG DOC THIN PREP: NORMAL
DX ICD CODE: NORMAL
HPV I/H RISK 4 DNA CVX QL PROBE+SIG AMP: NEGATIVE
Lab: NORMAL
N GONORRHOEA RRNA CVX QL NAA+PROBE: NEGATIVE
OTHER STN SPEC: NORMAL
STAT OF ADQ CVX/VAG CYTO-IMP: NORMAL
T VAGINALIS RRNA SPEC QL NAA+PROBE: NEGATIVE

## 2024-07-24 NOTE — PROGRESS NOTES
Routine Prenatal Visit     Subjective  Yarely Newman is a 34 y.o.  at 19w2d here for her routine OB visit.   She is taking her prenatal vitamins.Reports no loss of fluid or vaginal bleeding. Patient doing well without any complaints. Pregnancy is complicated by:     Patient Active Problem List   Diagnosis    Palpitations    Tachycardia    Obesity (BMI 30-39.9)    Supervision of other normal pregnancy, antepartum    H/O  section    Hx of gestational diabetes in prior pregnancy, currently pregnant    AMA (advanced maternal age) multigravida 35+         OB History    Para Term  AB Living   3 1 1   1 1   SAB IAB Ectopic Molar Multiple Live Births             1      # Outcome Date GA Lbr Phu/2nd Weight Sex Type Anes PTL Lv   3 Current            2 Term 2012    M CS-Unspec   SHAYY   1 AB                    ROS:    General ROS: negative for - chills or fatigue  Genito-Urinary ROS: negative for  change in urinary stream, vaginal discharge     Objective  Physical Exam:    Vitals:    24 1018   BP: 133/76       Uterine Size: not examined, US today  FHT: 110-160 BPM     General appearance - alert, well appearing, and in no distress  Abdomen- Soft, Gravid uterus, non-tender to palpation  Extremeties: negative swelling       Assessment/Plan:   Diagnoses and all orders for this visit:    1. Supervision of other normal pregnancy, antepartum (Primary)  Assessment & Plan:  SANDRA finalized: 2024 by LMP and 12wk US    Genetic testing (NIPS-Quad)/CF/AFP:  declined    COVID: recommended  Flu: recommended  Tdap: 27-36 weeks    Rhogam: Apos  ?Sterilization:    Anatomy US: 2024 EFW 36%, AC 30% breech, posterior grade 1 placenta, normal anatomy with suboptimal heart views, female  FU US:    PROBLEM LIST/PLAN:   Hx of C/S- recommend repeat  Hx of GDM- checking BG 4x daily, plan to do 1 hour GTT however if patient fails she may just continue to check blood glucoses throughout the pregnancy instead  of proceeding with a 3-hour GTT  AMA- plan  testing    Orders:  -     POC Urinalysis Dipstick  -     US Ob 14 + Weeks Single or First Gestation; Future    2. Antepartum multigravida of advanced maternal age    3. H/O  section    4. Hx of gestational diabetes in prior pregnancy, currently pregnant  Assessment & Plan:  Still having some fastings that are elevated however she states she has noticed changes with diet.  Postprandials are all normal.  Discussed at next visit that we would plan to do 1 hour GTT.  I discussed with her that if she fails we may just consider her GDM and have her continue to check her blood sugars instead of proceeding with a 3-hour GTT.            Counseling:   Second trimester precautions  Round Ligament Pain:  The uterus has several ligaments which provide support and keep the uterus in place. As the  uterus grows these ligaments are pulled and stretched which often causes sharp stabbing like pain in the inguinal area.   You may find a pregnancy support band helpful. Changing positions may also help. Yoga is a great way to cope with round ligament and low back pain in pregnancy.    Massage may also help with low back pain   Things to Consider at this Point in your Pregnancy:  Some women experience swelling in their feet during pregnancy. Compression stockings may help  Drink plenty of water and stay active   Make sure you are eating frequent small meals, nuts are a wonderful snack to keep with you            Return in about 5 weeks (around 2024) for Routine OB visit.      We have gone over prenatal care to include the timing and content of visits. I informed her how to contact the office and/or on call person in the event of any problems and encouraged her to do so when she feels it is necessary.  We then spent time answering her questions which she indicated were answered to her satisfaction.    Kristal Torres DO  2024 18:16 EDT

## 2024-07-25 ENCOUNTER — ROUTINE PRENATAL (OUTPATIENT)
Dept: OBSTETRICS AND GYNECOLOGY | Facility: CLINIC | Age: 34
End: 2024-07-25
Payer: COMMERCIAL

## 2024-07-25 VITALS — WEIGHT: 199 LBS | SYSTOLIC BLOOD PRESSURE: 133 MMHG | DIASTOLIC BLOOD PRESSURE: 76 MMHG | BODY MASS INDEX: 30.26 KG/M2

## 2024-07-25 DIAGNOSIS — Z98.891 H/O CESAREAN SECTION: ICD-10-CM

## 2024-07-25 DIAGNOSIS — Z34.80 SUPERVISION OF OTHER NORMAL PREGNANCY, ANTEPARTUM: Primary | ICD-10-CM

## 2024-07-25 DIAGNOSIS — O09.529 ANTEPARTUM MULTIGRAVIDA OF ADVANCED MATERNAL AGE: ICD-10-CM

## 2024-07-25 DIAGNOSIS — Z86.32 HX OF GESTATIONAL DIABETES IN PRIOR PREGNANCY, CURRENTLY PREGNANT: ICD-10-CM

## 2024-07-25 DIAGNOSIS — O09.299 HX OF GESTATIONAL DIABETES IN PRIOR PREGNANCY, CURRENTLY PREGNANT: ICD-10-CM

## 2024-07-25 LAB
GLUCOSE UR STRIP-MCNC: NEGATIVE MG/DL
PROT UR STRIP-MCNC: NEGATIVE MG/DL

## 2024-07-25 NOTE — ASSESSMENT & PLAN NOTE
Still having some fastings that are elevated however she states she has noticed changes with diet.  Postprandials are all normal.  Discussed at next visit that we would plan to do 1 hour GTT.  I discussed with her that if she fails we may just consider her GDM and have her continue to check her blood sugars instead of proceeding with a 3-hour GTT.

## 2024-07-25 NOTE — ASSESSMENT & PLAN NOTE
SANDRA finalized: 2024 by LMP and 12wk US    Genetic testing (NIPS-Quad)/CF/AFP:  declined    COVID: recommended  Flu: recommended  Tdap: 27-36 weeks    Rhogam: Apos  ?Sterilization:    Anatomy US: 2024 EFW 36%, AC 30% breech, posterior grade 1 placenta, normal anatomy with suboptimal heart views, female  FU US:    PROBLEM LIST/PLAN:   Hx of C/S- recommend repeat  Hx of GDM- checking BG 4x daily, plan to do 1 hour GTT however if patient fails she may just continue to check blood glucoses throughout the pregnancy instead of proceeding with a 3-hour GTT  AMA- plan  testing

## 2024-08-05 ENCOUNTER — REFERRAL TRIAGE (OUTPATIENT)
Dept: LABOR AND DELIVERY | Facility: HOSPITAL | Age: 34
End: 2024-08-05
Payer: COMMERCIAL

## 2024-08-26 NOTE — PROGRESS NOTES
Routine Prenatal Visit     Subjective  Yarely Newman is a 34 y.o.  at 24w0d here for her routine OB visit.   She is taking her prenatal vitamins.Reports no loss of fluid or vaginal bleeding. Patient doing well without any complaints. Pregnancy is complicated by:     Patient Active Problem List   Diagnosis    Palpitations    Tachycardia    Obesity (BMI 30-39.9)    Supervision of other normal pregnancy, antepartum    H/O  section    Hx of gestational diabetes in prior pregnancy, currently pregnant    AMA (advanced maternal age) multigravida 35+    Antepartum placenta circumvallata         OB History    Para Term  AB Living   3 1 1   1 1   SAB IAB Ectopic Molar Multiple Live Births             1      # Outcome Date GA Lbr Phu/2nd Weight Sex Type Anes PTL Lv   3 Current            2 Term 2012    M CS-Unspec   SHAYY   1 AB                    ROS:   General ROS: negative for - chills or fatigue  Genito-Urinary ROS: negative for  change in urinary stream, vaginal discharge     Objective  Physical Exam:   Vitals:    24 1541   BP: 121/76       Uterine Size: not examined, US today  FHT: 110-160 BPM    General appearance - alert, well appearing, and in no distress  Abdomen- Soft, Gravid uterus, non-tender to palpation  Extremeties: negative swelling       Assessment/Plan:   Diagnoses and all orders for this visit:    1. Supervision of other normal pregnancy, antepartum (Primary)  Assessment & Plan:  SANDRA finalized: 2024 by LMP and 12wk US    Genetic testing (NIPS-Quad)/CF/AFP:  declined    COVID: recommended  Flu: recommended  Tdap: 27-36 weeks    Rhogam: Apos  ?Sterilization:    Anatomy US: 2024 EFW 36%, AC 30% breech, posterior grade 1 placenta, normal anatomy with suboptimal heart views, female  FU US:  EFW 52%, AC 30%, cephalic, posterior grade 1 circumvallate placenta, follow-up anatomy normal    PROBLEM LIST/PLAN:   Hx of C/S- recommend repeat  Hx of GDM- checking BG 4x  daily, plan to do 1 hour GTT however if patient fails she may just continue to check blood glucoses throughout the pregnancy instead of proceeding with a 3-hour GTT  AMA- plan  testing  Circumvallate placenta- q4w growth US    Orders:  -     POC Urinalysis Dipstick  -     CBC (No Diff); Future  -     Gestational Diabetes Screen 1 Hour; Future  -     RPR, Rfx Qn RPR / Confirm TP  -     CBC (No Diff)  -     Gestational Diabetes Screen 1 Hour    2. Antepartum placenta circumvallata  -     US Ob 14 + Weeks Single or First Gestation; Standing    3. H/O  section    4. Hx of gestational diabetes in prior pregnancy, currently pregnant    5. Antepartum multigravida of advanced maternal age  -     US Ob 14 + Weeks Single or First Gestation; Standing          Counseling:   Second trimester precautions  Round Ligament Pain:  The uterus has several ligaments which provide support and keep the uterus in place. As the  uterus grows these ligaments are pulled and stretched which often causes sharp stabbing like pain in the inguinal area.   You may find a pregnancy support band helpful. Changing positions may also help. Yoga is a great way to cope with round ligament and low back pain in pregnancy.    Massage may also help with low back pain   Things to Consider at this Point in your Pregnancy:  Some women experience swelling in their feet during pregnancy. Compression stockings may help  Drink plenty of water and stay active   Make sure you are eating frequent small meals, nuts are a wonderful snack to keep with you            Return in about 4 weeks (around 2024) for Routine OB visit.      We have gone over prenatal care to include the timing and content of visits. I informed her how to contact the office and/or on call person in the event of any problems and encouraged her to do so when she feels it is necessary.  We then spent time answering her questions which she indicated were answered to her  satisfaction.    Kristal Torres,   8/27/2024 19:09 EDT

## 2024-08-27 ENCOUNTER — ROUTINE PRENATAL (OUTPATIENT)
Dept: OBSTETRICS AND GYNECOLOGY | Facility: CLINIC | Age: 34
End: 2024-08-27
Payer: COMMERCIAL

## 2024-08-27 VITALS — BODY MASS INDEX: 30.26 KG/M2 | WEIGHT: 199 LBS | SYSTOLIC BLOOD PRESSURE: 121 MMHG | DIASTOLIC BLOOD PRESSURE: 76 MMHG

## 2024-08-27 DIAGNOSIS — O09.529 ANTEPARTUM MULTIGRAVIDA OF ADVANCED MATERNAL AGE: ICD-10-CM

## 2024-08-27 DIAGNOSIS — O43.119 ANTEPARTUM PLACENTA CIRCUMVALLATA: ICD-10-CM

## 2024-08-27 DIAGNOSIS — Z98.891 H/O CESAREAN SECTION: ICD-10-CM

## 2024-08-27 DIAGNOSIS — Z86.32 HX OF GESTATIONAL DIABETES IN PRIOR PREGNANCY, CURRENTLY PREGNANT: ICD-10-CM

## 2024-08-27 DIAGNOSIS — O09.299 HX OF GESTATIONAL DIABETES IN PRIOR PREGNANCY, CURRENTLY PREGNANT: ICD-10-CM

## 2024-08-27 DIAGNOSIS — Z34.80 SUPERVISION OF OTHER NORMAL PREGNANCY, ANTEPARTUM: Primary | ICD-10-CM

## 2024-08-27 LAB
GLUCOSE 1H P GLC SERPL-MCNC: 190 MG/DL (ref 65–139)
GLUCOSE UR STRIP-MCNC: NEGATIVE MG/DL
PROT UR STRIP-MCNC: NEGATIVE MG/DL

## 2024-08-27 PROCEDURE — 99213 OFFICE O/P EST LOW 20 MIN: CPT | Performed by: STUDENT IN AN ORGANIZED HEALTH CARE EDUCATION/TRAINING PROGRAM

## 2024-08-27 PROCEDURE — 85027 COMPLETE CBC AUTOMATED: CPT | Performed by: STUDENT IN AN ORGANIZED HEALTH CARE EDUCATION/TRAINING PROGRAM

## 2024-08-27 PROCEDURE — 82950 GLUCOSE TEST: CPT | Performed by: STUDENT IN AN ORGANIZED HEALTH CARE EDUCATION/TRAINING PROGRAM

## 2024-08-27 PROCEDURE — 86592 SYPHILIS TEST NON-TREP QUAL: CPT | Performed by: STUDENT IN AN ORGANIZED HEALTH CARE EDUCATION/TRAINING PROGRAM

## 2024-08-27 NOTE — ASSESSMENT & PLAN NOTE
SANDRA finalized: 2024 by LMP and 12wk US    Genetic testing (NIPS-Quad)/CF/AFP:  declined    COVID: recommended  Flu: recommended  Tdap: 27-36 weeks    Rhogam: Apos  ?Sterilization:    Anatomy US: 2024 EFW 36%, AC 30% breech, posterior grade 1 placenta, normal anatomy with suboptimal heart views, female  FU US:  EFW 52%, AC 30%, cephalic, posterior grade 1 circumvallate placenta, follow-up anatomy normal    PROBLEM LIST/PLAN:   Hx of C/S- recommend repeat  Hx of GDM- checking BG 4x daily, plan to do 1 hour GTT however if patient fails she may just continue to check blood glucoses throughout the pregnancy instead of proceeding with a 3-hour GTT  AMA- plan  testing  Circumvallate placenta- q4w growth US

## 2024-08-28 LAB
DEPRECATED RDW RBC AUTO: 41.4 FL (ref 37–54)
ERYTHROCYTE [DISTWIDTH] IN BLOOD BY AUTOMATED COUNT: 11.5 % (ref 12.3–15.4)
HCT VFR BLD AUTO: 32.1 % (ref 34–46.6)
HGB BLD-MCNC: 10.9 G/DL (ref 12–15.9)
MCH RBC QN AUTO: 33.5 PG (ref 26.6–33)
MCHC RBC AUTO-ENTMCNC: 34 G/DL (ref 31.5–35.7)
MCV RBC AUTO: 98.8 FL (ref 79–97)
PLATELET # BLD AUTO: 251 10*3/MM3 (ref 140–450)
PMV BLD AUTO: 10.9 FL (ref 6–12)
RBC # BLD AUTO: 3.25 10*6/MM3 (ref 3.77–5.28)
RPR SER QL: NON REACTIVE
WBC NRBC COR # BLD AUTO: 10.33 10*3/MM3 (ref 3.4–10.8)

## 2024-08-29 ENCOUNTER — TELEPHONE (OUTPATIENT)
Dept: OBSTETRICS AND GYNECOLOGY | Facility: CLINIC | Age: 34
End: 2024-08-29
Payer: COMMERCIAL

## 2024-08-29 DIAGNOSIS — Z86.32 HX OF GESTATIONAL DIABETES IN PRIOR PREGNANCY, CURRENTLY PREGNANT: Primary | ICD-10-CM

## 2024-08-29 DIAGNOSIS — O09.299 HX OF GESTATIONAL DIABETES IN PRIOR PREGNANCY, CURRENTLY PREGNANT: Primary | ICD-10-CM

## 2024-08-29 NOTE — TELEPHONE ENCOUNTER
Patient aware of her results. Please sign the attached order for referral to the diabetic educator. She has a glucometer but will need a script placed for test strips and lancets for an Assure platinum glucometer. Please sign/place orders.

## 2024-08-29 NOTE — TELEPHONE ENCOUNTER
----- Message from Kristal Torres sent at 8/28/2024  1:09 PM EDT -----  Failed 1hr gtt, I would advise to check BG 4x daily with how elevated the 1hr was.   Please send diabetic supplies and referral to diabetic education

## 2024-08-30 RX ORDER — LANCETS 30 GAUGE
120 EACH MISCELLANEOUS 4 TIMES DAILY
Qty: 120 EACH | Refills: 3 | Status: SHIPPED | OUTPATIENT
Start: 2024-08-30

## 2024-08-30 RX ORDER — BLOOD PRESSURE TEST KIT
KIT MISCELLANEOUS
Qty: 120 EACH | Refills: 3 | Status: SHIPPED | OUTPATIENT
Start: 2024-08-30

## 2024-09-25 ENCOUNTER — ROUTINE PRENATAL (OUTPATIENT)
Dept: OBSTETRICS AND GYNECOLOGY | Facility: CLINIC | Age: 34
End: 2024-09-25
Payer: COMMERCIAL

## 2024-09-25 VITALS — WEIGHT: 203 LBS | DIASTOLIC BLOOD PRESSURE: 64 MMHG | SYSTOLIC BLOOD PRESSURE: 110 MMHG | BODY MASS INDEX: 30.87 KG/M2

## 2024-09-25 DIAGNOSIS — O09.529 ANTEPARTUM MULTIGRAVIDA OF ADVANCED MATERNAL AGE: ICD-10-CM

## 2024-09-25 DIAGNOSIS — Z98.891 H/O CESAREAN SECTION: ICD-10-CM

## 2024-09-25 DIAGNOSIS — O43.119 ANTEPARTUM PLACENTA CIRCUMVALLATA: ICD-10-CM

## 2024-09-25 DIAGNOSIS — Z34.80 SUPERVISION OF OTHER NORMAL PREGNANCY, ANTEPARTUM: Primary | ICD-10-CM

## 2024-09-25 DIAGNOSIS — O24.410 DIET CONTROLLED GESTATIONAL DIABETES MELLITUS (GDM), ANTEPARTUM: ICD-10-CM

## 2024-09-25 LAB
GLUCOSE UR STRIP-MCNC: NEGATIVE MG/DL
PROT UR STRIP-MCNC: NEGATIVE MG/DL

## 2024-09-25 PROCEDURE — 99213 OFFICE O/P EST LOW 20 MIN: CPT | Performed by: STUDENT IN AN ORGANIZED HEALTH CARE EDUCATION/TRAINING PROGRAM

## 2024-10-16 NOTE — PROGRESS NOTES
Routine Prenatal Visit     Subjective  Yarely Newman is a 34 y.o.  at 31w6d here for her routine OB visit.   She is taking her prenatal vitamins.Reports no loss of fluid or vaginal bleeding. Patient doing well. Having some pelvic pain.    Pregnancy complicated by:     Patient Active Problem List   Diagnosis    Palpitations    Tachycardia    Obesity (BMI 30-39.9)    Supervision of other normal pregnancy, antepartum    H/O  section    Hx of gestational diabetes in prior pregnancy, currently pregnant    AMA (advanced maternal age) multigravida 35+    Antepartum placenta circumvallata    Diet controlled gestational diabetes mellitus (GDM), antepartum         OB History    Para Term  AB Living   3 1 1   1 1   SAB IAB Ectopic Molar Multiple Live Births             1      # Outcome Date GA Lbr Phu/2nd Weight Sex Type Anes PTL Lv   3 Current            2 Term 2012    M CS-Unspec   SHAYY   1 AB                    ROS:  General ROS: negative for - chills or fatigue  Genito-Urinary ROS: negative for  change in urinary stream, vaginal discharge     Objective  Physical Exam:   Vitals:    10/21/24 1521   BP: 134/86       Uterine Size: size equals dates  FHT: 110-160 BPM    General appearance - alert, well appearing, and in no distress  Abdomen- Soft, Gravid uterus, non-tender to palpation  Extremeties: negative swelling       Assessment/Plan:   Diagnoses and all orders for this visit:    1. Supervision of other normal pregnancy, antepartum (Primary)  Assessment & Plan:  SANDRA finalized: 2024 by LMP and 12wk US    Genetic testing (NIPS-Quad)/CF/AFP:  declined    COVID: recommended  Flu: recommended  Tdap: 27-36 weeks    Rhogam: Apos  ?Sterilization:    Anatomy US: 2024 EFW 36%, AC 30% breech, posterior grade 1 placenta, normal anatomy with suboptimal heart views, female  FU US:  EFW 52%, AC 30%, cephalic, posterior grade 1 circumvallate placenta, follow-up anatomy normal  FU US  EFW  52%, AC 63%, cephalic, posterior grade 1 circumvallate placenta, MICAELA 13.9    PROBLEM LIST/PLAN:   Hx of C/S- recommend repeat  GDMA1- growth US, continue to monitor BG  Hx of GDM- checking BG 4x daily, failed 1hr gtt, would prefer to check BG instead of 3hr gtt  AMA- plan  testing  Circumvallate placenta- q4w growth US    Orders:  -     POC Urinalysis Dipstick    2. Diet controlled gestational diabetes mellitus (GDM), antepartum  Assessment & Plan:  Did not bring logs today, reports all wnl except a couple of postprandial and it was related to diet    Orders:  -     US Ob 14 + Weeks Single or First Gestation; Future    3. Antepartum placenta circumvallata  -     US Ob 14 + Weeks Single or First Gestation; Future    4. Antepartum multigravida of advanced maternal age    5. H/O  section    6. Obesity (BMI 30-39.9)    7. Hx of gestational diabetes in prior pregnancy, currently pregnant            Counseling:   OB precautions, leaking, VB, tristan armstrong vs PTL/Labor  [facility]  GESTATIONAL DIABETES discussed.  Importance of strict BS control (goals <95F & <120 2hr PP).  Record and bring all BS to qOV (provided log).  Risks of inadequate control NLT macrosomia, shoulder dystocia +/- perm nn damage/fractures/O2 deprivation/brain damage, maternal lacs/dyspareunia, incontinence, prolapse, CS, hemorrhage/transfusion, childhood obesity/DM.  Appropriate diet choices, consistency with eating/sleep schedules, & exercising recommended.  Lifetime risk DM and need for PP and yearly glucose checks.  Call [facility] and ask for office clinical nurse/manager if >25% of BS out of normal range before next OV to discuss treatment.  Round Ligament Pain:  The uterus has several ligaments which provide support and keep the uterus in place. As the  uterus grows these ligaments are pulled and stretched which often causes sharp stabbing like pain in the inguinal area.   You may find a pregnancy support band helpful. Changing positions may  also help. Yoga is a great way to cope with round ligament and low back pain in pregnancy.    Massage may also help with low back pain   Things to Consider at this Point in your Pregnancy:  Some women experience swelling in their feet during pregnancy. Compression stockings may help  Drink plenty of water and stay active   Make sure you are eating frequent small meals, nuts are a wonderful snack to keep with you            Return in about 2 weeks (around 11/4/2024) for Routine OB visit.      We have gone over prenatal care to include the timing and content of visits. I informed her how to contact the office and/or on call person in the event of any problems and encouraged her to do so when she feels it is necessary.  We then spent time answering her questions which she indicated were answered to her satisfaction.    Kristal Torres DO  10/21/2024 17:35 EDT

## 2024-10-21 ENCOUNTER — ROUTINE PRENATAL (OUTPATIENT)
Dept: OBSTETRICS AND GYNECOLOGY | Facility: CLINIC | Age: 34
End: 2024-10-21
Payer: COMMERCIAL

## 2024-10-21 VITALS — WEIGHT: 207 LBS | BODY MASS INDEX: 31.47 KG/M2 | DIASTOLIC BLOOD PRESSURE: 86 MMHG | SYSTOLIC BLOOD PRESSURE: 134 MMHG

## 2024-10-21 DIAGNOSIS — Z86.32 HX OF GESTATIONAL DIABETES IN PRIOR PREGNANCY, CURRENTLY PREGNANT: ICD-10-CM

## 2024-10-21 DIAGNOSIS — Z34.80 SUPERVISION OF OTHER NORMAL PREGNANCY, ANTEPARTUM: Primary | ICD-10-CM

## 2024-10-21 DIAGNOSIS — E66.9 OBESITY (BMI 30-39.9): ICD-10-CM

## 2024-10-21 DIAGNOSIS — O09.529 ANTEPARTUM MULTIGRAVIDA OF ADVANCED MATERNAL AGE: ICD-10-CM

## 2024-10-21 DIAGNOSIS — O43.119 ANTEPARTUM PLACENTA CIRCUMVALLATA: ICD-10-CM

## 2024-10-21 DIAGNOSIS — Z98.891 H/O CESAREAN SECTION: ICD-10-CM

## 2024-10-21 DIAGNOSIS — O24.410 DIET CONTROLLED GESTATIONAL DIABETES MELLITUS (GDM), ANTEPARTUM: ICD-10-CM

## 2024-10-21 DIAGNOSIS — O09.299 HX OF GESTATIONAL DIABETES IN PRIOR PREGNANCY, CURRENTLY PREGNANT: ICD-10-CM

## 2024-10-21 LAB
GLUCOSE UR STRIP-MCNC: NEGATIVE MG/DL
PROT UR STRIP-MCNC: NEGATIVE MG/DL

## 2024-10-21 PROCEDURE — 99213 OFFICE O/P EST LOW 20 MIN: CPT | Performed by: STUDENT IN AN ORGANIZED HEALTH CARE EDUCATION/TRAINING PROGRAM

## 2024-10-21 RX ORDER — BLOOD-GLUCOSE METER
1 KIT MISCELLANEOUS 4 TIMES DAILY
COMMUNITY
Start: 2024-09-03

## 2024-10-21 NOTE — ASSESSMENT & PLAN NOTE
SANDRA finalized: 2024 by LMP and 12wk US    Genetic testing (NIPS-Quad)/CF/AFP:  declined    COVID: recommended  Flu: recommended  Tdap: 27-36 weeks    Rhogam: Apos  ?Sterilization:    Anatomy US: 2024 EFW 36%, AC 30% breech, posterior grade 1 placenta, normal anatomy with suboptimal heart views, female  FU US:  EFW 52%, AC 30%, cephalic, posterior grade 1 circumvallate placenta, follow-up anatomy normal  FU US  EFW 52%, AC 63%, cephalic, posterior grade 1 circumvallate placenta, MICAELA 13.9    PROBLEM LIST/PLAN:   Hx of C/S- recommend repeat  GDMA1- growth US, continue to monitor BG  Hx of GDM- checking BG 4x daily, failed 1hr gtt, would prefer to check BG instead of 3hr gtt  AMA- plan  testing  Circumvallate placenta- q4w growth US

## 2024-10-21 NOTE — ASSESSMENT & PLAN NOTE
Did not bring logs today, reports all wnl except a couple of postprandial and it was related to diet

## 2024-11-05 NOTE — PROGRESS NOTES
Routine Prenatal Visit     Subjective  Yarely Newman is a 34 y.o.  at 34w1d here for her routine OB visit.   She is taking her prenatal vitamins.Reports no loss of fluid or vaginal bleeding. Patient doing well.     Pregnancy complicated by:     Patient Active Problem List   Diagnosis    Palpitations    Tachycardia    Obesity (BMI 30-39.9)    Supervision of other normal pregnancy, antepartum    H/O  section    Hx of gestational diabetes in prior pregnancy, currently pregnant    AMA (advanced maternal age) multigravida 35+    Antepartum placenta circumvallata    Diet controlled gestational diabetes mellitus (GDM), antepartum         OB History    Para Term  AB Living   3 1 1   1 1   SAB IAB Ectopic Molar Multiple Live Births             1      # Outcome Date GA Lbr Phu/2nd Weight Sex Type Anes PTL Lv   3 Current            2 Term 2012    M CS-Unspec   SHAYY   1 AB                    ROS:  General ROS: negative for - chills or fatigue  Genito-Urinary ROS: negative for  change in urinary stream, vaginal discharge     Objective  Physical Exam:   Vitals:    24 1512   BP: 127/79       Uterine Size: not examined, US today  FHT: 110-160 BPM    General appearance - alert, well appearing, and in no distress  Abdomen- Soft, Gravid uterus, non-tender to palpation  Extremeties: negative swelling       Assessment/Plan:   Diagnoses and all orders for this visit:    1. Supervision of other normal pregnancy, antepartum (Primary)  Assessment & Plan:  SANDRA finalized: 2024 by LMP and 12wk US    Genetic testing (NIPS-Quad)/CF/AFP:  declined    COVID: recommended  Flu: recommended  Tdap: 27-36 weeks    Rhogam: Apos  ?Sterilization:    Anatomy US: 2024 EFW 36%, AC 30% breech, posterior grade 1 placenta, normal anatomy with suboptimal heart views, female  FU US:  EFW 52%, AC 30%, cephalic, posterior grade 1 circumvallate placenta, follow-up anatomy normal  FU US  EFW 52%, AC 63%,  cephalic, posterior grade 1 circumvallate placenta, MICAELA 13.9  FU US  EFW 58%, AC 82%, cephalic, posterior grade 2 placenta, MICAELA 14.36    PROBLEM LIST/PLAN:   Hx of C/S- recommend repeat, scheduled 12/15  GDMA1- growth US, continue to monitor BG  Hx of GDM- checking BG 4x daily, failed 1hr gtt, would prefer to check BG instead of 3hr gtt  AMA- plan  testing  Circumvallate placenta- q4w growth US    Orders:  -     POC Urinalysis Dipstick  -     Fetal Nonstress Test; Standing    2. Antepartum multigravida of advanced maternal age    3. Antepartum placenta circumvallata    4. H/O  section  -     Cancel: Case Request; Standing  -     sodium chloride 0.9 % flush 10 mL  -     sodium chloride 0.9 % flush 10 mL  -     sodium chloride 0.9 % infusion 40 mL  -     lidocaine PF 1% (XYLOCAINE) injection 0.5 mL  -     lactated ringers bolus 1,000 mL  -     lactated ringers infusion  -     Sod Citrate-Citric Acid (BICITRA) oral solution 30 mL  -     famotidine (PEPCID) injection 20 mg  -     acetaminophen (TYLENOL) tablet 1,000 mg  -     ceFAZolin (ANCEF) 2 g in sodium chloride 0.9 % 100 mL IVPB  -     oxytocin (PITOCIN) 30 units in 0.9% sodium chloride 500 mL (premix)  -     oxytocin (PITOCIN) 30 units in 0.9% sodium chloride 500 mL (premix)  -     ketorolac (TORADOL) injection 30 mg  -     acetaminophen (TYLENOL) tablet 650 mg  -     ondansetron ODT (ZOFRAN-ODT) disintegrating tablet 4 mg  -     ondansetron (ZOFRAN) injection 4 mg  -     famotidine (PEPCID) injection 20 mg  -     famotidine (PEPCID) tablet 20 mg  -     Cancel: Case Request    5. Diet controlled gestational diabetes mellitus (GDM), antepartum  Assessment & Plan:  Blood glucose logs reviewed from 10/22 to .  2 elevated fastings and 3 elevated 2-hour postprandial.  Overall appears well-controlled with diet  Declines diabetic education.       6. Need for influenza vaccination  -     Fluzone >6mos    Other orders  -     Admit To Obstetrics  Inpatient; Standing  -     Code Status and Medical Interventions: CPR (Attempt to Resuscitate); Full; Standing  -     Obtain Informed Consent; Standing  -     Vital Signs q 4 while awake; Standing  -     Vital Signs Per Hospital Policy; Standing  -     Continuous Fetal Monitoring With NST on Admission and Prior to Initiation of Oxytocin.; Standing  -     External Uterine Contraction Monitoring; Standing  -     Notify Provider (Specified); Standing  -     Notify Provider of Tachysystole (Per Hospital Algorithm); Standing  -     Notify Provider if Membranes Ruptured, Bleeding Greater Than 1 Pad Per Hour, Fetal Heart Tone Abnormality or Severe Pain; Standing  -     Bed Rest With Bathroom Privileges; Standing  -     Insert Indwelling Urinary Catheter; Standing  -     Assess Need for Indwelling Urinary Catheter - Follow Removal Protocol; Standing  -     Urinary Catheter Care; Standing  -     Abdominal Prep With Clippers; Standing  -     Chlorhexadine Skin Prep Unless Otherwise Indicated; Standing  -     SCD (Sequential Compression Devices); Standing  -     NPO Diet NPO Type: Strict NPO; Standing  -     Inpatient Anesthesiology Consult; Standing  -     Type & Screen; Standing  -     CBC (No Diff); Standing  -     Treponema pallidum AB w/Reflex RPR; Standing  -     Urine Drug Screen - Urine, Clean Catch; Standing  -     Insert Peripheral IV; Standing  -     Saline Lock & Maintain IV Access; Standing  -     Notify Provider (Specified); Standing  -     Vital Signs Per Hospital Policy; Standing  -     Strict Bed Rest; Standing  -     Fundal & Lochia Check; Standing  -     Diet: Regular/House; Fluid Consistency: Thin (IDDSI 0); Standing  -     Blood Gas, Arterial, Cord; Standing  -     Blood Gas, Venous, Cord; Standing  -     If indicated -- Please administer RH Immunoglobulin based on results of cord blood evaluation and fetal screen lab tests, pharmacy to dispense; Standing            Counseling:   OB precautions, leaking,  VB, tristan armstrong vs PTL/Labor  [facility]  GESTATIONAL DIABETES discussed.  Importance of strict BS control (goals <95F & <120 2hr PP).  Record and bring all BS to qOV (provided log).  Risks of inadequate control NLT macrosomia, shoulder dystocia +/- perm nn damage/fractures/O2 deprivation/brain damage, maternal lacs/dyspareunia, incontinence, prolapse, CS, hemorrhage/transfusion, childhood obesity/DM.  Appropriate diet choices, consistency with eating/sleep schedules, & exercising recommended.  Lifetime risk DM and need for PP and yearly glucose checks.  Call [facility] and ask for office clinical nurse/manager if >25% of BS out of normal range before next OV to discuss treatment.  CS scheduled  Round Ligament Pain:  The uterus has several ligaments which provide support and keep the uterus in place. As the  uterus grows these ligaments are pulled and stretched which often causes sharp stabbing like pain in the inguinal area.   You may find a pregnancy support band helpful. Changing positions may also help. Yoga is a great way to cope with round ligament and low back pain in pregnancy.    Massage may also help with low back pain   Things to Consider at this Point in your Pregnancy:  Some women experience swelling in their feet during pregnancy. Compression stockings may help  Drink plenty of water and stay active   Make sure you are eating frequent small meals, nuts are a wonderful snack to keep with you            Return in about 2 weeks (around 11/20/2024) for Routine OB visit.      We have gone over prenatal care to include the timing and content of visits. I informed her how to contact the office and/or on call person in the event of any problems and encouraged her to do so when she feels it is necessary.  We then spent time answering her questions which she indicated were answered to her satisfaction.    Kristal Torres,   11/6/2024 17:08 EST

## 2024-11-06 ENCOUNTER — HOSPITAL ENCOUNTER (OUTPATIENT)
Facility: HOSPITAL | Age: 34
Discharge: HOME | End: 2024-11-06
Attending: OBSTETRICS & GYNECOLOGY | Admitting: OBSTETRICS & GYNECOLOGY
Payer: COMMERCIAL

## 2024-11-06 ENCOUNTER — ROUTINE PRENATAL (OUTPATIENT)
Dept: OBSTETRICS AND GYNECOLOGY | Facility: CLINIC | Age: 34
End: 2024-11-06
Payer: COMMERCIAL

## 2024-11-06 VITALS
SYSTOLIC BLOOD PRESSURE: 127 MMHG | WEIGHT: 207.38 LBS | BODY MASS INDEX: 31.53 KG/M2 | DIASTOLIC BLOOD PRESSURE: 79 MMHG

## 2024-11-06 VITALS — HEART RATE: 84 BPM | RESPIRATION RATE: 18 BRPM | SYSTOLIC BLOOD PRESSURE: 109 MMHG | DIASTOLIC BLOOD PRESSURE: 55 MMHG

## 2024-11-06 DIAGNOSIS — Z23 NEED FOR INFLUENZA VACCINATION: ICD-10-CM

## 2024-11-06 DIAGNOSIS — Z98.891 H/O CESAREAN SECTION: ICD-10-CM

## 2024-11-06 DIAGNOSIS — Z34.80 SUPERVISION OF OTHER NORMAL PREGNANCY, ANTEPARTUM: Primary | ICD-10-CM

## 2024-11-06 DIAGNOSIS — O24.410 DIET CONTROLLED GESTATIONAL DIABETES MELLITUS (GDM), ANTEPARTUM: ICD-10-CM

## 2024-11-06 DIAGNOSIS — O43.119 ANTEPARTUM PLACENTA CIRCUMVALLATA: ICD-10-CM

## 2024-11-06 DIAGNOSIS — O09.529 ANTEPARTUM MULTIGRAVIDA OF ADVANCED MATERNAL AGE: ICD-10-CM

## 2024-11-06 LAB
GLUCOSE UR STRIP-MCNC: NEGATIVE MG/DL
PROT UR STRIP-MCNC: NEGATIVE MG/DL

## 2024-11-06 PROCEDURE — 59025 FETAL NON-STRESS TEST: CPT | Performed by: OBSTETRICS & GYNECOLOGY

## 2024-11-06 PROCEDURE — 59025 FETAL NON-STRESS TEST: CPT

## 2024-11-06 RX ORDER — FAMOTIDINE 10 MG
20 TABLET ORAL ONCE AS NEEDED
Status: CANCELLED | OUTPATIENT
Start: 2024-11-06

## 2024-11-06 RX ORDER — CITRIC ACID/SODIUM CITRATE 334-500MG
30 SOLUTION, ORAL ORAL ONCE
Status: CANCELLED | OUTPATIENT
Start: 2024-11-06 | End: 2024-11-06

## 2024-11-06 RX ORDER — OXYTOCIN/0.9 % SODIUM CHLORIDE 30/500 ML
999 PLASTIC BAG, INJECTION (ML) INTRAVENOUS ONCE
Status: CANCELLED | OUTPATIENT
Start: 2024-11-06 | End: 2024-11-06

## 2024-11-06 RX ORDER — SODIUM CHLORIDE 0.9 % (FLUSH) 0.9 %
10 SYRINGE (ML) INJECTION EVERY 12 HOURS SCHEDULED
Status: CANCELLED | OUTPATIENT
Start: 2024-11-06

## 2024-11-06 RX ORDER — FAMOTIDINE 10 MG/ML
20 INJECTION, SOLUTION INTRAVENOUS ONCE AS NEEDED
Status: CANCELLED | OUTPATIENT
Start: 2024-11-06

## 2024-11-06 RX ORDER — OXYTOCIN/0.9 % SODIUM CHLORIDE 30/500 ML
250 PLASTIC BAG, INJECTION (ML) INTRAVENOUS CONTINUOUS
Status: CANCELLED | OUTPATIENT
Start: 2024-11-06 | End: 2024-11-06

## 2024-11-06 RX ORDER — LIDOCAINE HYDROCHLORIDE 10 MG/ML
0.5 INJECTION, SOLUTION EPIDURAL; INFILTRATION; INTRACAUDAL; PERINEURAL ONCE AS NEEDED
Status: CANCELLED | OUTPATIENT
Start: 2024-11-06

## 2024-11-06 RX ORDER — ONDANSETRON 2 MG/ML
4 INJECTION INTRAMUSCULAR; INTRAVENOUS EVERY 6 HOURS PRN
Status: CANCELLED | OUTPATIENT
Start: 2024-11-06

## 2024-11-06 RX ORDER — ONDANSETRON 4 MG/1
4 TABLET, ORALLY DISINTEGRATING ORAL EVERY 6 HOURS PRN
Status: CANCELLED | OUTPATIENT
Start: 2024-11-06

## 2024-11-06 RX ORDER — SODIUM CHLORIDE, SODIUM LACTATE, POTASSIUM CHLORIDE, CALCIUM CHLORIDE 600; 310; 30; 20 MG/100ML; MG/100ML; MG/100ML; MG/100ML
150 INJECTION, SOLUTION INTRAVENOUS CONTINUOUS
Status: CANCELLED | OUTPATIENT
Start: 2024-11-06 | End: 2024-11-08

## 2024-11-06 RX ORDER — SODIUM CHLORIDE 0.9 % (FLUSH) 0.9 %
10 SYRINGE (ML) INJECTION AS NEEDED
Status: CANCELLED | OUTPATIENT
Start: 2024-11-06

## 2024-11-06 RX ORDER — ACETAMINOPHEN 500 MG
1000 TABLET ORAL ONCE
Status: CANCELLED | OUTPATIENT
Start: 2024-11-06 | End: 2024-11-06

## 2024-11-06 RX ORDER — SODIUM CHLORIDE 9 MG/ML
40 INJECTION, SOLUTION INTRAVENOUS AS NEEDED
Status: CANCELLED | OUTPATIENT
Start: 2024-11-06

## 2024-11-06 RX ORDER — KETOROLAC TROMETHAMINE 15 MG/ML
30 INJECTION, SOLUTION INTRAMUSCULAR; INTRAVENOUS ONCE
Status: CANCELLED | OUTPATIENT
Start: 2024-11-06 | End: 2024-11-06

## 2024-11-06 RX ORDER — ACETAMINOPHEN 325 MG/1
650 TABLET ORAL ONCE AS NEEDED
Status: CANCELLED | OUTPATIENT
Start: 2024-11-06

## 2024-11-06 NOTE — NON STRESS TEST
Obstetrical Non-stress Test Interpretation     Name:  Yarely Newman  MRN: [id number]    34 y.o. female  at 34w1d    Indication: AMA, GDM       Fetal Assessment  Fetal HR Assessment Method: external  Fetal HR (beats/min): 135  Fetal HR Baseline: normal range  Fetal HR Variability: moderate (amplitude range 6 to 25 bpm)  Fetal HR Accelerations: greater than/equal to 15 bpm, lasting at least 15 seconds  Fetal HR Decelerations: absent  Sinusoidal Pattern Present: absent    /55 (BP Location: Right arm)   Pulse 84   Resp 18   LMP 2024 (Exact Date)     Reason for test:  (AMA, GDM)  Date of Test: 2024  Time frame of test: 6323-4171  RN NST Interpretation: Reactive      Chantelle Esteves RN  2024  17:49 EST

## 2024-11-06 NOTE — ASSESSMENT & PLAN NOTE
SANDRA finalized: 2024 by LMP and 12wk US    Genetic testing (NIPS-Quad)/CF/AFP:  declined    COVID: recommended  Flu: recommended  Tdap: 27-36 weeks    Rhogam: Apos  ?Sterilization:    Anatomy US: 2024 EFW 36%, AC 30% breech, posterior grade 1 placenta, normal anatomy with suboptimal heart views, female  FU US:  EFW 52%, AC 30%, cephalic, posterior grade 1 circumvallate placenta, follow-up anatomy normal  FU US  EFW 52%, AC 63%, cephalic, posterior grade 1 circumvallate placenta, MICAELA 13.9  FU US  EFW 58%, AC 82%, cephalic, posterior grade 2 placenta, MICAELA 14.36    PROBLEM LIST/PLAN:   Hx of C/S- recommend repeat, scheduled 12/15  GDMA1- growth US, continue to monitor BG  Hx of GDM- checking BG 4x daily, failed 1hr gtt, would prefer to check BG instead of 3hr gtt  AMA- plan  testing  Circumvallate placenta- q4w growth US

## 2024-11-06 NOTE — ASSESSMENT & PLAN NOTE
Blood glucose logs reviewed from 10/22 to 11/6.  2 elevated fastings and 3 elevated 2-hour postprandial.  Overall appears well-controlled with diet  Declines diabetic education.

## 2024-11-13 ENCOUNTER — HOSPITAL ENCOUNTER (OUTPATIENT)
Facility: HOSPITAL | Age: 34
Discharge: HOME | End: 2024-11-13
Attending: OBSTETRICS & GYNECOLOGY | Admitting: OBSTETRICS & GYNECOLOGY
Payer: COMMERCIAL

## 2024-11-13 ENCOUNTER — HOSPITAL ENCOUNTER (OUTPATIENT)
Dept: LABOR AND DELIVERY | Facility: HOSPITAL | Age: 34
Discharge: HOME | End: 2024-11-13
Payer: COMMERCIAL

## 2024-11-13 VITALS
SYSTOLIC BLOOD PRESSURE: 98 MMHG | DIASTOLIC BLOOD PRESSURE: 60 MMHG | RESPIRATION RATE: 18 BRPM | HEART RATE: 88 BPM | OXYGEN SATURATION: 99 %

## 2024-11-13 PROCEDURE — 59025 FETAL NON-STRESS TEST: CPT

## 2024-11-13 PROCEDURE — G0463 HOSPITAL OUTPT CLINIC VISIT: HCPCS

## 2024-11-13 NOTE — NON STRESS TEST
Obstetrical Non-stress Test Interpretation     Name:  Yarely Newman  MRN: [id number]    34 y.o. female  at 35w1d    Indication: NST/GDM/CIRCUMVALLATE PLACENTA      Fetal Assessment  Fetal Movement: active  Fetal HR Assessment Method: external  Fetal HR (beats/min): 140  Fetal HR Baseline: normal range  Fetal HR Variability: moderate (amplitude range 6 to 25 bpm)  Fetal HR Accelerations: lasting at least 15 seconds  Fetal HR Decelerations: absent    LMP 2024 (Exact Date)   BP 98/60, PULSE 88, RESP 18.    Reason for test: OB Triage (NST/GDM/CIRCUMVALLATE PLACENTA)  Date of Test: 2024  Time frame of test: 1584-7582  RN NST Interpretation: Reactive      Zeny Menon RN  2024  18:06 EST

## 2024-11-14 NOTE — NON STRESS TEST
Obstetrical Non-stress Test Interpretation     Name:  Yarely Newman  MRN: [id number]    34 y.o. female  at 35w1d    Indication: GDM; circumvallate placenta      Baseline: Normal 110-160 bpm  Variability:   Moderate/Normal (amplitude 6-25 bpm)  Accelerations: Present (32 weeks+) 15 x 15 bpm  Decelerations: Absent   Contractions:  Present       Impression:  Category I       Keyla Medina MD  2024  20:13 EST

## 2024-11-16 ENCOUNTER — APPOINTMENT (OUTPATIENT)
Facility: HOSPITAL | Age: 34
End: 2024-11-16
Payer: COMMERCIAL

## 2024-11-16 ENCOUNTER — HOSPITAL ENCOUNTER (EMERGENCY)
Facility: HOSPITAL | Age: 34
Discharge: HOME | End: 2024-11-16
Attending: EMERGENCY MEDICINE
Payer: COMMERCIAL

## 2024-11-16 VITALS
TEMPERATURE: 98.1 F | BODY MASS INDEX: 32.46 KG/M2 | RESPIRATION RATE: 16 BRPM | OXYGEN SATURATION: 100 % | HEART RATE: 107 BPM | WEIGHT: 206.79 LBS | SYSTOLIC BLOOD PRESSURE: 139 MMHG | DIASTOLIC BLOOD PRESSURE: 76 MMHG | HEIGHT: 67 IN

## 2024-11-16 DIAGNOSIS — I82.812 SUPERFICIAL THROMBOSIS OF LEFT LOWER EXTREMITY: Primary | ICD-10-CM

## 2024-11-16 DIAGNOSIS — I83.12 VARICOSE VEINS OF LEFT LOWER EXTREMITY WITH INFLAMMATION: ICD-10-CM

## 2024-11-16 PROCEDURE — 93971 EXTREMITY STUDY: CPT

## 2024-11-16 PROCEDURE — 93971 EXTREMITY STUDY: CPT | Performed by: SURGERY

## 2024-11-16 PROCEDURE — 99284 EMERGENCY DEPT VISIT MOD MDM: CPT

## 2024-11-16 NOTE — ED PROVIDER NOTES
Time: 6:15 PM EST  Date of encounter:  2024  Independent Historian/Clinical History and Information was obtained by:   Patient    History is limited by: N/A    Chief Complaint   Patient presents with    Leg Pain     Left calf pain times one week, progressively getting worse, 35 weeks pregnant         History of Present Illness:  Patient is a 34 y.o. year old female who presents to the emergency department for evaluation of left leg pain and swelling.  States that symptoms have been ongoing for the past week.  She is about 35 weeks pregnant.  She is  A1.  Denies any chest pain, shortness of breath. Pain is mainly on the left lateral leg. (Triage Provider: Roman Rosario PA-C).  Patient states she has had an area of a varicose vein on the left leg for many years however it has become more swollen and erythematous around it.  She is not sure of any injury to the area.       Patient Care Team  Primary Care Provider: Hien Estevez APRN    Past Medical History:     Allergies   Allergen Reactions    Erythromycin Hives     Past Medical History:   Diagnosis Date    Abnormal ECG     Anxiety     Arrhythmia     Heart palpitations     Hyperlipidemia     Migraine     Polycystic ovary syndrome      Past Surgical History:   Procedure Laterality Date    ANKLE SURGERY Left      SECTION       Family History   Problem Relation Age of Onset    Mental illness Father     Drug abuse Father     Alcohol abuse Father     Depression Father     Anxiety disorder Father     Hyperlipidemia Father     Hypertension Father     Diabetes Father     Liver disease Mother     Heart disease Mother     Depression Mother     Anxiety disorder Mother     Hyperlipidemia Mother     Hypertension Mother     Diabetes Mother     Alcohol abuse Mother     Drug abuse Mother     Breast cancer Sister     Mental illness Sister     Thyroid disease Maternal Aunt     Hyperlipidemia Maternal Aunt     Hypertension Maternal Aunt     Hyperlipidemia  Maternal Aunt     Hypertension Maternal Aunt     Ovarian cancer Neg Hx     Uterine cancer Neg Hx     Colon cancer Neg Hx     Deep vein thrombosis Neg Hx     Pulmonary embolism Neg Hx        Home Medications:  Prior to Admission medications    Medication Sig Start Date End Date Taking? Authorizing Provider   Alcohol Swabs pads Use to clean fingers before checking BS 4 times per day and PRN 8/30/24   Kristal Torres DO   Blood Glucose Monitoring Suppl (FreeStyle Lite) w/Device kit 1 each by Other route 4 (Four) Times a Day. 9/3/24   Tricia Madison MD   cholecalciferol (Vitamin D) 25 MCG (1000 UT) tablet Take 1 tablet by mouth 2 (Two) Times a Day.    Tricia Madison MD   glucose blood test strip Check BS 4x/day as instructed. 8/30/24   Kristal Torres DO   Lancets misc Use 120 each 4 (Four) Times a Day. Check blood sugars four times daily as directed 8/30/24   Kristal Torres DO   prenatal vitamin (prenatal, CLASSIC, vitamin) tablet Take  by mouth Daily.    Tricia Madison MD   vitamin B-12 (CYANOCOBALAMIN) 500 MCG tablet Take 1 tablet by mouth Daily.    Tricia Madison MD        Social History:   Social History     Tobacco Use    Smoking status: Former     Current packs/day: 0.00     Average packs/day: 1 pack/day for 15.0 years (15.0 ttl pk-yrs)     Types: Cigarettes     Start date: 2006     Quit date: 2021     Years since quitting: 3.8    Smokeless tobacco: Never   Vaping Use    Vaping status: Every Day    Substances: Nicotine, Flavoring    Devices: Disposable   Substance Use Topics    Alcohol use: Not Currently    Drug use: Never     Types: Marijuana         Review of Systems:  Review of Systems   Respiratory:  Negative for shortness of breath.    Cardiovascular:  Positive for leg swelling. Negative for chest pain.   Skin:  Positive for color change.        Physical Exam:  /76 (BP Location: Right arm, Patient Position: Sitting)   Pulse 107   Temp 98.1 °F (36.7 °C) (Oral)   Resp 16    "Ht 170.2 cm (67\")   Wt 93.8 kg (206 lb 12.7 oz)   LMP 03/12/2024 (Exact Date)   SpO2 100%   BMI 32.39 kg/m²         Physical Exam  Vitals and nursing note reviewed.   Constitutional:       Appearance: Normal appearance.   HENT:      Head: Normocephalic and atraumatic.      Nose: Nose normal.   Eyes:      Conjunctiva/sclera: Conjunctivae normal.   Cardiovascular:      Rate and Rhythm: Normal rate and regular rhythm.      Pulses:           Dorsalis pedis pulses are 2+ on the right side and 2+ on the left side.      Heart sounds: Normal heart sounds.   Pulmonary:      Effort: Pulmonary effort is normal.      Breath sounds: Normal breath sounds.   Abdominal:      Comments: Abdomen is gravid   Musculoskeletal:         General: Normal range of motion.      Cervical back: Normal range of motion and neck supple.   Skin:     General: Skin is warm and dry.          Neurological:      General: No focal deficit present.      Mental Status: She is alert and oriented to person, place, and time.   Psychiatric:         Mood and Affect: Mood normal.         Behavior: Behavior normal.         Thought Content: Thought content normal.         Judgment: Judgment normal.                    Procedures:  Procedures      Medical Decision Making:    Comorbidities that affect care:    Pregnancy, hyperlipidemia    External Notes reviewed:    Previous Clinic Note: Last seen by OB/GYN on 11-6/24      The following orders were placed and all results were independently analyzed by me:  No orders of the defined types were placed in this encounter.      Medications Given in the Emergency Department:  Medications - No data to display     ED Course:    The patient was initially evaluated in the triage area where orders were placed. The patient was later dispositioned by Douglas Lauren PA-C.      The patient was advised to stay for completion of workup which includes but is not limited to communication of labs and radiological results, " reassessment and plan. The patient was advised that leaving prior to disposition by a provider could result in critical findings that are not communicated to the patient.     ED Course as of 11/16/24 2058   Sat Nov 16, 2024 1816 PROVIDER IN TRIAGE  Patient was evaluated by me, Roman Rosario PA-C. Orders were placed and awaiting final results and disposition.   [MV]   1903 Discussed patient with ED  to contact cast manage her to have vascular ultrasound, complete imaging [MD]   2025 Vascular ultrasound reported patient is positive for superficial thromboses, but no DVT [MD]      ED Course User Index  [MD] Douglas Lauren PA-C  [MV] Roman Rosario PA       Labs:    Lab Results (last 24 hours)       ** No results found for the last 24 hours. **             Imaging:    No Radiology Exams Resulted Within Past 24 Hours      Differential Diagnosis and Discussion:      Extremity Pain: Differential diagnosis includes but is not limited to soft tissue sprain, tendonitis, tendon injury, dislocation, fracture, deep vein thrombosis, arterial insufficiency, osteoarthritis, bursitis, and ligamentous damage.    Ultrasound impression was interpreted by me.     MDM  Number of Diagnoses or Management Options  Superficial thrombosis of left lower extremity  Varicose veins of left lower extremity with inflammation  Diagnosis management comments: Patient presented to the emergency department today for left leg pain.  Duplex ultrasound was obtained and is negative for DVT but does note superficial venous thrombosis.  Patient is currently pregnant so cannot take anti-inflammatories this time informed patient to apply warm compresses and continue wearing compression stockings.  Patient given return to the emergency department guidelines.       Amount and/or Complexity of Data Reviewed  Tests in the radiology section of CPT®: ordered    Risk of Complications, Morbidity, and/or Mortality  Presenting problems: moderate  Diagnostic  procedures: low  Management options: low    Patient Progress  Patient progress: stable       Patient Care Considerations:    ANTIBIOTICS: I considered prescribing antibiotics as an outpatient however no bacterial focus of infection was found.      Consultants/Shared Management Plan:    None    Social Determinants of Health:    Patient is independent, reliable, and has access to care.       Disposition and Care Coordination:    Discharged: The patient is suitable and stable for discharge with no need for consideration of admission.    I have explained the patient´s condition, diagnoses and treatment plan based on the information available to me at this time. I have answered questions and addressed any concerns. The patient has a good  understanding of the patient´s diagnosis, condition, and treatment plan as can be expected at this point. The vital signs have been stable. The patient´s condition is stable and appropriate for discharge from the emergency department.      The patient will pursue further outpatient evaluation with the primary care physician or other designated or consulting physician as outlined in the discharge instructions. They are agreeable to this plan of care and follow-up instructions have been explained in detail. The patient has received these instructions in written format and has expressed an understanding of the discharge instructions. The patient is aware that any significant change in condition or worsening of symptoms should prompt an immediate return to this or the closest emergency department or call to 911.  I have explained discharge medications and the need for follow up with the patient/caretakers. This was also printed in the discharge instructions. Patient was discharged with the following medications and follow up:      Medication List      No changes were made to your prescriptions during this visit.      Hien Estevez, APRN  [address]  [facility] [address]  [facility]  [address]  [phone]             Final diagnoses:   Superficial thrombosis of left lower extremity   Varicose veins of left lower extremity with inflammation        ED Disposition       ED Disposition   Discharge    Condition   Stable    Comment   --               This medical record created using voice recognition software.             Douglas Lauren PA-C  11/16/24 [id number]

## 2024-11-17 LAB
BH CV LOW VAS LEFT VARICOSITY BK VESSEL: 1
BH CV LOWER VASCULAR LEFT COMMON FEMORAL AUGMENT: NORMAL
BH CV LOWER VASCULAR LEFT COMMON FEMORAL COMPETENT: NORMAL
BH CV LOWER VASCULAR LEFT COMMON FEMORAL COMPRESS: NORMAL
BH CV LOWER VASCULAR LEFT COMMON FEMORAL PHASIC: NORMAL
BH CV LOWER VASCULAR LEFT COMMON FEMORAL SPONT: NORMAL
BH CV LOWER VASCULAR LEFT DISTAL FEMORAL COMPRESS: NORMAL
BH CV LOWER VASCULAR LEFT GASTRONEMIUS COMPRESS: NORMAL
BH CV LOWER VASCULAR LEFT GREATER SAPH AK COMPRESS: NORMAL
BH CV LOWER VASCULAR LEFT GREATER SAPH BK COMPRESS: NORMAL
BH CV LOWER VASCULAR LEFT LESSER SAPH COMPRESS: NORMAL
BH CV LOWER VASCULAR LEFT MID FEMORAL AUGMENT: NORMAL
BH CV LOWER VASCULAR LEFT MID FEMORAL COMPETENT: NORMAL
BH CV LOWER VASCULAR LEFT MID FEMORAL COMPRESS: NORMAL
BH CV LOWER VASCULAR LEFT MID FEMORAL PHASIC: NORMAL
BH CV LOWER VASCULAR LEFT MID FEMORAL SPONT: NORMAL
BH CV LOWER VASCULAR LEFT PERONEAL COMPRESS: NORMAL
BH CV LOWER VASCULAR LEFT POPLITEAL AUGMENT: NORMAL
BH CV LOWER VASCULAR LEFT POPLITEAL COMPETENT: NORMAL
BH CV LOWER VASCULAR LEFT POPLITEAL COMPRESS: NORMAL
BH CV LOWER VASCULAR LEFT POPLITEAL PHASIC: NORMAL
BH CV LOWER VASCULAR LEFT POPLITEAL SPONT: NORMAL
BH CV LOWER VASCULAR LEFT POSTERIOR TIBIAL COMPRESS: NORMAL
BH CV LOWER VASCULAR LEFT PROXIMAL FEMORAL COMPRESS: NORMAL
BH CV LOWER VASCULAR LEFT SAPHENOFEMORAL JUNCTION COMPRESS: NORMAL
BH CV LOWER VASCULAR LEFT VARICOSITY BK COMPRESS: NORMAL
BH CV LOWER VASCULAR LEFT VARICOSITY BK THROMBUS: NORMAL
BH CV LOWER VASCULAR RIGHT COMMON FEMORAL AUGMENT: NORMAL
BH CV LOWER VASCULAR RIGHT COMMON FEMORAL COMPETENT: NORMAL
BH CV LOWER VASCULAR RIGHT COMMON FEMORAL COMPRESS: NORMAL
BH CV LOWER VASCULAR RIGHT COMMON FEMORAL PHASIC: NORMAL
BH CV LOWER VASCULAR RIGHT COMMON FEMORAL SPONT: NORMAL
BH CV VAS PRELIMINARY FINDINGS SCRIPTING: 1

## 2024-11-17 NOTE — DISCHARGE INSTRUCTIONS
The ultrasound completed in the emergency department today does not show any deep venous clot however you do have some superficial clotting of the varicose vein on the leg.  Apply warm compresses to the area 4-5 times per day for 15 to 20 minutes at a time.  Return to the emergency department for chest pain, shortness of breath, other symptoms concerning to you.

## 2024-11-18 NOTE — PROGRESS NOTES
Routine Prenatal Visit     Subjective  Yarely Newman is a 34 y.o.  at 36w1d here for her routine OB visit.   She is taking her prenatal vitamins.Reports no loss of fluid or vaginal bleeding. Patient doing well.     Pregnancy complicated by:     Patient Active Problem List   Diagnosis    Palpitations    Tachycardia    Obesity (BMI 30-39.9)    Supervision of other normal pregnancy, antepartum    H/O  section    Hx of gestational diabetes in prior pregnancy, currently pregnant    AMA (advanced maternal age) multigravida 35+    Antepartum placenta circumvallata    Diet controlled gestational diabetes mellitus (GDM), antepartum    Varicose veins of left lower extremity with other complications         OB History    Para Term  AB Living   3 1 1   1 1   SAB IAB Ectopic Molar Multiple Live Births             1      # Outcome Date GA Lbr Phu/2nd Weight Sex Type Anes PTL Lv   3 Current            2 Term 2012    M CS-Unspec   SHAYY   1 AB                    ROS:  General ROS: negative for - chills or fatigue  Genito-Urinary ROS: negative for  change in urinary stream, vaginal discharge     Objective  Physical Exam:   Vitals:    24 1545   BP: 116/77       Uterine Size: size equals dates  FHT: 110-160 BPM    General appearance - alert, well appearing, and in no distress  Abdomen- Soft, Gravid uterus, non-tender to palpation  Extremeties: negative swelling   GBS RV swab performed today    Assessment/Plan:   Diagnoses and all orders for this visit:    1. Supervision of other normal pregnancy, antepartum (Primary)  -     POC Urinalysis Dipstick  -     Group B Strep (Molecular) - Swab, Vaginal/Rectum; Future  -     ABRYSVO RSV Vaccine (Adults 60+, pregnant women 32-36 wks)    2. Multigravida of advanced maternal age in third trimester    3. H/O  section    4. Diet controlled gestational diabetes mellitus (GDM), antepartum    5. Tachycardia    6. Obesity (BMI 30-39.9)    7. Varicose veins  of left lower extremity with other complications            Counseling:   OB precautions, leaking, VB, tristan armstrong vs PTL/Labor  [facility]  GESTATIONAL DIABETES discussed.  Importance of strict BS control (goals <95F & <120 2hr PP).  Record and bring all BS to qOV (provided log).  Risks of inadequate control NLT macrosomia, shoulder dystocia +/- perm nn damage/fractures/O2 deprivation/brain damage, maternal lacs/dyspareunia, incontinence, prolapse, CS, hemorrhage/transfusion, childhood obesity/DM.  Appropriate diet choices, consistency with eating/sleep schedules, & exercising recommended.  Lifetime risk DM and need for PP and yearly glucose checks.  Call [facility] and ask for office clinical nurse/manager if >25% of BS out of normal range before next OV to discuss treatment.  Round Ligament Pain:  The uterus has several ligaments which provide support and keep the uterus in place. As the  uterus grows these ligaments are pulled and stretched which often causes sharp stabbing like pain in the inguinal area.   You may find a pregnancy support band helpful. Changing positions may also help. Yoga is a great way to cope with round ligament and low back pain in pregnancy.    Massage may also help with low back pain   Things to Consider at this Point in your Pregnancy:  Some women experience swelling in their feet during pregnancy. Compression stockings may help  Drink plenty of water and stay active   Make sure you are eating frequent small meals, nuts are a wonderful snack to keep with you            Return in about 1 week (around 11/27/2024) for Routine OB visit.      We have gone over prenatal care to include the timing and content of visits. I informed her how to contact the office and/or on call person in the event of any problems and encouraged her to do so when she feels it is necessary.  We then spent time answering her questions which she indicated were answered to her satisfaction.    Kristal Torres, DO  11/20/2024 16:10  EST

## 2024-11-20 ENCOUNTER — HOSPITAL ENCOUNTER (OUTPATIENT)
Facility: HOSPITAL | Age: 34
Discharge: HOME | End: 2024-11-20
Attending: OBSTETRICS & GYNECOLOGY | Admitting: OBSTETRICS & GYNECOLOGY
Payer: COMMERCIAL

## 2024-11-20 ENCOUNTER — ROUTINE PRENATAL (OUTPATIENT)
Dept: OBSTETRICS AND GYNECOLOGY | Facility: CLINIC | Age: 34
End: 2024-11-20
Payer: COMMERCIAL

## 2024-11-20 ENCOUNTER — HOSPITAL ENCOUNTER (OUTPATIENT)
Dept: LABOR AND DELIVERY | Facility: HOSPITAL | Age: 34
Discharge: HOME | End: 2024-11-20
Admitting: STUDENT IN AN ORGANIZED HEALTH CARE EDUCATION/TRAINING PROGRAM
Payer: COMMERCIAL

## 2024-11-20 VITALS — SYSTOLIC BLOOD PRESSURE: 116 MMHG | DIASTOLIC BLOOD PRESSURE: 77 MMHG | BODY MASS INDEX: 32.58 KG/M2 | WEIGHT: 208 LBS

## 2024-11-20 VITALS — RESPIRATION RATE: 16 BRPM | DIASTOLIC BLOOD PRESSURE: 62 MMHG | SYSTOLIC BLOOD PRESSURE: 114 MMHG | HEART RATE: 88 BPM

## 2024-11-20 DIAGNOSIS — I83.892 VARICOSE VEINS OF LEFT LOWER EXTREMITY WITH OTHER COMPLICATIONS: ICD-10-CM

## 2024-11-20 DIAGNOSIS — Z98.891 H/O CESAREAN SECTION: ICD-10-CM

## 2024-11-20 DIAGNOSIS — R00.0 TACHYCARDIA: ICD-10-CM

## 2024-11-20 DIAGNOSIS — E66.9 OBESITY (BMI 30-39.9): ICD-10-CM

## 2024-11-20 DIAGNOSIS — Z34.80 SUPERVISION OF OTHER NORMAL PREGNANCY, ANTEPARTUM: Primary | ICD-10-CM

## 2024-11-20 DIAGNOSIS — O24.410 DIET CONTROLLED GESTATIONAL DIABETES MELLITUS (GDM), ANTEPARTUM: ICD-10-CM

## 2024-11-20 DIAGNOSIS — O09.523 MULTIGRAVIDA OF ADVANCED MATERNAL AGE IN THIRD TRIMESTER: ICD-10-CM

## 2024-11-20 LAB
GLUCOSE UR STRIP-MCNC: NEGATIVE MG/DL
PROT UR STRIP-MCNC: NEGATIVE MG/DL

## 2024-11-20 PROCEDURE — 87653 STREP B DNA AMP PROBE: CPT | Performed by: STUDENT IN AN ORGANIZED HEALTH CARE EDUCATION/TRAINING PROGRAM

## 2024-11-20 PROCEDURE — 59025 FETAL NON-STRESS TEST: CPT | Performed by: OBSTETRICS & GYNECOLOGY

## 2024-11-20 PROCEDURE — 59025 FETAL NON-STRESS TEST: CPT

## 2024-11-20 NOTE — NON STRESS TEST
Obstetrical Non-stress Test Interpretation     Name:  Yarely Newman  MRN: [id number]    34 y.o. female  at 36w1d    Indication: AMA, GDM, circumvallate placenta       Fetal Movement: active  Fetal HR Assessment Method: external  Fetal HR (beats/min): 140  Fetal HR Baseline: normal range  Fetal HR Variability: moderate (amplitude range 6 to 25 bpm)  Fetal HR Accelerations: greater than/equal to 15 bpm, lasting at least 15 seconds  Fetal HR Decelerations: absent  Sinusoidal Pattern Present: absent    /62 (BP Location: Right arm, Patient Position: Sitting)   Pulse 88   Resp 16   LMP 2024 (Exact Date)     Reason for test: Other (Comment) (AMA, GDM, circumvallate placenta)  Date of Test: 2024  Time frame of test: 8557-7486  RN NST Interpretation: Reactive      Shira Mendez RN  2024  14:06 EST

## 2024-11-21 LAB — GROUP B STREP, DNA: NEGATIVE

## 2024-11-22 ENCOUNTER — TELEPHONE (OUTPATIENT)
Dept: OBSTETRICS AND GYNECOLOGY | Facility: CLINIC | Age: 34
End: 2024-11-22
Payer: COMMERCIAL

## 2024-11-22 NOTE — TELEPHONE ENCOUNTER
I called Yarely.  11/26/24 and 12/4/24 are hard for her due to her long work hours.  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  She wants to keep them where they are for now and will call to see if we have happened to have any cancellations for times later in the day.

## 2024-11-22 NOTE — TELEPHONE ENCOUNTER
I attempted to call the patient to inform her of her next 3 ob follow up appointments.  I left a voicemail asking her to check her [employer] message for more information about the visits and to either call us at the office or send a [employer] message back to confirm that she received the information.

## 2024-11-22 NOTE — TELEPHONE ENCOUNTER
Caller: LEILANI CHOW    Relationship: SELF    Best call back number: [phone]    What is the best time to reach you: ANY    Who are you requesting to speak with (clinical staff, provider,  specific staff member): ARSEN    Do you know the name of the person who called: ARSEN    What was the call regarding: APPTS    Is it okay if the provider responds through MyChart: PLEASE CALL PT BACK; UNABLE TO WT CALL TO PRACTICE.

## 2024-11-27 ENCOUNTER — HOSPITAL ENCOUNTER (OUTPATIENT)
Facility: HOSPITAL | Age: 34
Discharge: HOME | End: 2024-11-27
Attending: OBSTETRICS & GYNECOLOGY | Admitting: STUDENT IN AN ORGANIZED HEALTH CARE EDUCATION/TRAINING PROGRAM
Payer: COMMERCIAL

## 2024-11-27 ENCOUNTER — HOSPITAL ENCOUNTER (OUTPATIENT)
Dept: LABOR AND DELIVERY | Facility: HOSPITAL | Age: 34
Discharge: HOME | End: 2024-11-27
Payer: COMMERCIAL

## 2024-11-27 VITALS — RESPIRATION RATE: 18 BRPM | SYSTOLIC BLOOD PRESSURE: 112 MMHG | HEART RATE: 93 BPM | DIASTOLIC BLOOD PRESSURE: 64 MMHG

## 2024-11-27 PROCEDURE — 59025 FETAL NON-STRESS TEST: CPT

## 2024-11-27 NOTE — NON STRESS TEST
Obstetrical Non-stress Test Interpretation     Name:  Yarely Newman  MRN: [id number]    34 y.o. female  at 37w1d    Indication: GDM, circumvallate placenta      Fetal Movement: active  Fetal HR Assessment Method: external  Fetal HR (beats/min): 140  Fetal HR Baseline: normal range  Fetal HR Variability: moderate (amplitude range 6 to 25 bpm)  Fetal HR Accelerations: greater than/equal to 15 bpm, lasting at least 15 seconds  Fetal HR Decelerations: absent  Sinusoidal Pattern Present: absent    /64 (BP Location: Right arm, Patient Position: Sitting)   Pulse 93   Resp 18   LMP 2024 (Exact Date)     Reason for test: Diabetes, Other (Comment) (circumvallate placenta)  Date of Test: 2024  Time frame of test: 5520-0578  RN NST Interpretation: Reactive      Shira Mendez RN  2024  14:37 EST

## 2024-11-28 NOTE — NON STRESS TEST
Obstetrical Non-stress Test Interpretation     Name:  Yarely Newman  MRN: [id number]    34 y.o. female  at 37w1d    Indication: GDM; circumvallate placenta      Baseline: Normal 110-160 bpm  Variability:   Moderate/Normal (amplitude 6-25 bpm)  Accelerations: Present (32 weeks+) 15 x 15 bpm  Decelerations: Absent   Contractions:  Absent       Impression:  Category I       Keyla Medina MD  2024  22:29 EST

## 2024-12-04 ENCOUNTER — HOSPITAL ENCOUNTER (OUTPATIENT)
Facility: HOSPITAL | Age: 34
Discharge: HOME | End: 2024-12-04
Attending: OBSTETRICS & GYNECOLOGY | Admitting: OBSTETRICS & GYNECOLOGY
Payer: COMMERCIAL

## 2024-12-04 ENCOUNTER — HOSPITAL ENCOUNTER (OUTPATIENT)
Dept: LABOR AND DELIVERY | Facility: HOSPITAL | Age: 34
Discharge: HOME | End: 2024-12-04
Payer: COMMERCIAL

## 2024-12-04 VITALS — DIASTOLIC BLOOD PRESSURE: 61 MMHG | RESPIRATION RATE: 16 BRPM | SYSTOLIC BLOOD PRESSURE: 109 MMHG | HEART RATE: 80 BPM

## 2024-12-04 PROCEDURE — 59025 FETAL NON-STRESS TEST: CPT | Performed by: OBSTETRICS & GYNECOLOGY

## 2024-12-04 PROCEDURE — 59025 FETAL NON-STRESS TEST: CPT

## 2024-12-04 NOTE — NON STRESS TEST
Obstetrical Non-stress Test Interpretation     Name:  Yarely Newman  MRN: [id number]    34 y.o. female  at 38w1d    Indication: GDM, AMA,  Circumvallate Placenta      Fetal Assessment  Fetal Movement: active  Fetal HR Assessment Method: external  Fetal HR (beats/min): 135  Fetal HR Baseline: normal range  Fetal HR Variability: moderate (amplitude range 6 to 25 bpm)  Fetal HR Accelerations: greater than/equal to 15 bpm, lasting at least 15 seconds  Fetal HR Decelerations: absent  Sinusoidal Pattern Present: absent    /61   Pulse 80   Resp 16   LMP 2024 (Exact Date)     Reason for test:    Date of Test: 2024  Time frame of test: 8695-0022  RN NST Interpretation: Tamia June RN  2024  16:52 EST

## 2024-12-04 NOTE — PROGRESS NOTES
Routine Prenatal Visit     Subjective  Yarely Newman is a 34 y.o.  at 38w2d here for her routine OB visit.   She is taking her prenatal vitamins.Reports no loss of fluid or vaginal bleeding. Patient doing well.  Patient reports shortness of breath, fatigue, sometimes dizziness.    Pregnancy complicated by:     Patient Active Problem List   Diagnosis    Palpitations    Tachycardia    Obesity (BMI 30-39.9)    Supervision of other normal pregnancy, antepartum    H/O  section    Hx of gestational diabetes in prior pregnancy, currently pregnant    AMA (advanced maternal age) multigravida 35+    Antepartum placenta circumvallata    Diet controlled gestational diabetes mellitus (GDM), antepartum    Varicose veins of left lower extremity with other complications         OB History    Para Term  AB Living   3 1 1   1 1   SAB IAB Ectopic Molar Multiple Live Births             1      # Outcome Date GA Lbr Phu/2nd Weight Sex Type Anes PTL Lv   3 Current            2 Term 2012    M CS-Unspec   SHAYY   1 AB                    ROS:  General ROS: negative for - chills or fatigue  Genito-Urinary ROS: negative for  change in urinary stream, vaginal discharge     Objective  Physical Exam:   Vitals:    24 1539   BP: 129/82       Uterine Size: size equals dates  FHT: 110-160 BPM    General appearance - alert, well appearing, and in no distress  Abdomen- Soft, Gravid uterus, non-tender to palpation  Extremeties: negative swelling       Assessment/Plan:   Diagnoses and all orders for this visit:    1. Supervision of other normal pregnancy, antepartum (Primary)  -     POC Urinalysis Dipstick  -     CBC (No Diff)  -     Iron Profile  -     sodium chloride 0.9 % flush 10 mL  -     sodium chloride 0.9 % flush 10 mL  -     sodium chloride 0.9 % infusion 40 mL  -     lidocaine PF 1% (XYLOCAINE) injection 0.5 mL  -     lactated ringers bolus 1,000 mL  -     lactated ringers infusion  -     Sod Citrate-Citric  Acid (BICITRA) oral solution 30 mL  -     famotidine (PEPCID) injection 20 mg  -     acetaminophen (TYLENOL) tablet 1,000 mg  -     ceFAZolin (ANCEF) 2 g in sodium chloride 0.9 % 100 mL IVPB  -     oxytocin (PITOCIN) 30 units in 0.9% sodium chloride 500 mL (premix)  -     oxytocin (PITOCIN) 30 units in 0.9% sodium chloride 500 mL (premix)  -     ketorolac (TORADOL) injection 30 mg  -     acetaminophen (TYLENOL) tablet 650 mg  -     ondansetron ODT (ZOFRAN-ODT) disintegrating tablet 4 mg  -     ondansetron (ZOFRAN) injection 4 mg  -     famotidine (PEPCID) injection 20 mg  -     famotidine (PEPCID) tablet 20 mg    2. Multigravida of advanced maternal age in third trimester    3. H/O  section  -     sodium chloride 0.9 % flush 10 mL  -     sodium chloride 0.9 % flush 10 mL  -     sodium chloride 0.9 % infusion 40 mL  -     lidocaine PF 1% (XYLOCAINE) injection 0.5 mL  -     lactated ringers bolus 1,000 mL  -     lactated ringers infusion  -     Sod Citrate-Citric Acid (BICITRA) oral solution 30 mL  -     famotidine (PEPCID) injection 20 mg  -     acetaminophen (TYLENOL) tablet 1,000 mg  -     ceFAZolin (ANCEF) 2 g in sodium chloride 0.9 % 100 mL IVPB  -     oxytocin (PITOCIN) 30 units in 0.9% sodium chloride 500 mL (premix)  -     oxytocin (PITOCIN) 30 units in 0.9% sodium chloride 500 mL (premix)  -     ketorolac (TORADOL) injection 30 mg  -     acetaminophen (TYLENOL) tablet 650 mg  -     ondansetron ODT (ZOFRAN-ODT) disintegrating tablet 4 mg  -     ondansetron (ZOFRAN) injection 4 mg  -     famotidine (PEPCID) injection 20 mg  -     famotidine (PEPCID) tablet 20 mg    4. Antepartum placenta circumvallata    5. Diet controlled gestational diabetes mellitus (GDM), antepartum  Assessment & Plan:  Did not bring glucose logs today.  She states her fastings have been in the 70s to 80s range      Other orders  -     Admit To Obstetrics Inpatient; Standing  -     Code Status and Medical Interventions: CPR (Attempt  to Resuscitate); Full; Standing  -     Obtain Informed Consent; Standing  -     Vital Signs q 4 while awake; Standing  -     Vital Signs Per Hospital Policy; Standing  -     Continuous Fetal Monitoring With NST on Admission and Prior to Initiation of Oxytocin.; Standing  -     External Uterine Contraction Monitoring; Standing  -     Notify Provider (Specified); Standing  -     Notify Provider of Tachysystole (Per Hospital Algorithm); Standing  -     Notify Provider if Membranes Ruptured, Bleeding Greater Than 1 Pad Per Hour, Fetal Heart Tone Abnormality or Severe Pain; Standing  -     Bed Rest With Bathroom Privileges; Standing  -     Insert Indwelling Urinary Catheter; Standing  -     Assess Need for Indwelling Urinary Catheter - Follow Removal Protocol; Standing  -     Urinary Catheter Care; Standing  -     Abdominal Prep With Clippers; Standing  -     Chlorhexadine Skin Prep Unless Otherwise Indicated; Standing  -     SCD (Sequential Compression Devices); Standing  -     NPO Diet NPO Type: Strict NPO; Standing  -     Inpatient Anesthesiology Consult; Standing  -     Type & Screen; Standing  -     CBC (No Diff); Standing  -     Treponema pallidum AB w/Reflex RPR; Standing  -     Urine Drug Screen - Urine, Clean Catch; Standing  -     Insert Peripheral IV; Standing  -     Saline Lock & Maintain IV Access; Standing  -     Notify Provider (Specified); Standing  -     Vital Signs Per Hospital Policy; Standing  -     Strict Bed Rest; Standing  -     Fundal & Lochia Check; Standing  -     Diet: Regular/House; Fluid Consistency: Thin (IDDSI 0); Standing  -     Blood Gas, Arterial, Cord; Standing  -     Blood Gas, Venous, Cord; Standing  -     If indicated -- Please administer RH Immunoglobulin based on results of cord blood evaluation and fetal screen lab tests, pharmacy to dispense; Standing            Counseling:   OB precautions, leaking, VB, tristan armstrong vs PTL/Labor  [facility]  CS scheduled  Round Ligament Pain:  The  uterus has several ligaments which provide support and keep the uterus in place. As the  uterus grows these ligaments are pulled and stretched which often causes sharp stabbing like pain in the inguinal area.   You may find a pregnancy support band helpful. Changing positions may also help. Yoga is a great way to cope with round ligament and low back pain in pregnancy.    Massage may also help with low back pain   Things to Consider at this Point in your Pregnancy:  Some women experience swelling in their feet during pregnancy. Compression stockings may help  Drink plenty of water and stay active   Make sure you are eating frequent small meals, nuts are a wonderful snack to keep with you            Return in about 1 week (around 12/12/2024) for Routine OB visit.      We have gone over prenatal care to include the timing and content of visits. I informed her how to contact the office and/or on call person in the event of any problems and encouraged her to do so when she feels it is necessary.  We then spent time answering her questions which she indicated were answered to her satisfaction.    Kristal Torres,   12/5/2024 16:50 EST

## 2024-12-05 ENCOUNTER — ROUTINE PRENATAL (OUTPATIENT)
Dept: OBSTETRICS AND GYNECOLOGY | Facility: CLINIC | Age: 34
End: 2024-12-05
Payer: COMMERCIAL

## 2024-12-05 VITALS — SYSTOLIC BLOOD PRESSURE: 129 MMHG | WEIGHT: 211 LBS | DIASTOLIC BLOOD PRESSURE: 82 MMHG | BODY MASS INDEX: 33.05 KG/M2

## 2024-12-05 DIAGNOSIS — O24.410 DIET CONTROLLED GESTATIONAL DIABETES MELLITUS (GDM), ANTEPARTUM: ICD-10-CM

## 2024-12-05 DIAGNOSIS — O43.119 ANTEPARTUM PLACENTA CIRCUMVALLATA: ICD-10-CM

## 2024-12-05 DIAGNOSIS — Z98.891 H/O CESAREAN SECTION: ICD-10-CM

## 2024-12-05 DIAGNOSIS — O09.523 MULTIGRAVIDA OF ADVANCED MATERNAL AGE IN THIRD TRIMESTER: ICD-10-CM

## 2024-12-05 DIAGNOSIS — Z34.80 SUPERVISION OF OTHER NORMAL PREGNANCY, ANTEPARTUM: Primary | ICD-10-CM

## 2024-12-05 LAB
DEPRECATED RDW RBC AUTO: 40.3 FL (ref 37–54)
ERYTHROCYTE [DISTWIDTH] IN BLOOD BY AUTOMATED COUNT: 11.6 % (ref 12.3–15.4)
GLUCOSE UR STRIP-MCNC: NEGATIVE MG/DL
HCT VFR BLD AUTO: 36 % (ref 34–46.6)
HGB BLD-MCNC: 12.5 G/DL (ref 12–15.9)
MCH RBC QN AUTO: 33.8 PG (ref 26.6–33)
MCHC RBC AUTO-ENTMCNC: 34.7 G/DL (ref 31.5–35.7)
MCV RBC AUTO: 97.3 FL (ref 79–97)
PLATELET # BLD AUTO: 260 10*3/MM3 (ref 140–450)
PMV BLD AUTO: 11.2 FL (ref 6–12)
PROT UR STRIP-MCNC: NEGATIVE MG/DL
RBC # BLD AUTO: 3.7 10*6/MM3 (ref 3.77–5.28)
WBC NRBC COR # BLD AUTO: 8.23 10*3/MM3 (ref 3.4–10.8)

## 2024-12-05 PROCEDURE — 83540 ASSAY OF IRON: CPT | Performed by: STUDENT IN AN ORGANIZED HEALTH CARE EDUCATION/TRAINING PROGRAM

## 2024-12-05 PROCEDURE — 85027 COMPLETE CBC AUTOMATED: CPT | Performed by: STUDENT IN AN ORGANIZED HEALTH CARE EDUCATION/TRAINING PROGRAM

## 2024-12-05 PROCEDURE — 84466 ASSAY OF TRANSFERRIN: CPT | Performed by: STUDENT IN AN ORGANIZED HEALTH CARE EDUCATION/TRAINING PROGRAM

## 2024-12-05 RX ORDER — SODIUM CHLORIDE, SODIUM LACTATE, POTASSIUM CHLORIDE, CALCIUM CHLORIDE 600; 310; 30; 20 MG/100ML; MG/100ML; MG/100ML; MG/100ML
150 INJECTION, SOLUTION INTRAVENOUS CONTINUOUS
OUTPATIENT
Start: 2024-12-05 | End: 2024-12-07

## 2024-12-05 RX ORDER — ACETAMINOPHEN 325 MG/1
650 TABLET ORAL ONCE AS NEEDED
OUTPATIENT
Start: 2024-12-05

## 2024-12-05 RX ORDER — ONDANSETRON 4 MG/1
4 TABLET, ORALLY DISINTEGRATING ORAL EVERY 6 HOURS PRN
OUTPATIENT
Start: 2024-12-05

## 2024-12-05 RX ORDER — LIDOCAINE HYDROCHLORIDE 10 MG/ML
0.5 INJECTION, SOLUTION EPIDURAL; INFILTRATION; INTRACAUDAL; PERINEURAL ONCE AS NEEDED
OUTPATIENT
Start: 2024-12-05

## 2024-12-05 RX ORDER — ACETAMINOPHEN 500 MG
1000 TABLET ORAL ONCE
OUTPATIENT
Start: 2024-12-05 | End: 2024-12-05

## 2024-12-05 RX ORDER — OXYTOCIN/0.9 % SODIUM CHLORIDE 30/500 ML
999 PLASTIC BAG, INJECTION (ML) INTRAVENOUS ONCE
OUTPATIENT
Start: 2024-12-05 | End: 2024-12-05

## 2024-12-05 RX ORDER — CITRIC ACID/SODIUM CITRATE 334-500MG
30 SOLUTION, ORAL ORAL ONCE
OUTPATIENT
Start: 2024-12-05 | End: 2024-12-05

## 2024-12-05 RX ORDER — OXYTOCIN/0.9 % SODIUM CHLORIDE 30/500 ML
250 PLASTIC BAG, INJECTION (ML) INTRAVENOUS CONTINUOUS
OUTPATIENT
Start: 2024-12-05 | End: 2024-12-05

## 2024-12-05 RX ORDER — FAMOTIDINE 10 MG/ML
20 INJECTION, SOLUTION INTRAVENOUS ONCE AS NEEDED
OUTPATIENT
Start: 2024-12-05

## 2024-12-05 RX ORDER — SODIUM CHLORIDE 0.9 % (FLUSH) 0.9 %
10 SYRINGE (ML) INJECTION AS NEEDED
OUTPATIENT
Start: 2024-12-05

## 2024-12-05 RX ORDER — SODIUM CHLORIDE 9 MG/ML
40 INJECTION, SOLUTION INTRAVENOUS AS NEEDED
OUTPATIENT
Start: 2024-12-05

## 2024-12-05 RX ORDER — FAMOTIDINE 10 MG
20 TABLET ORAL ONCE AS NEEDED
OUTPATIENT
Start: 2024-12-05

## 2024-12-05 RX ORDER — SODIUM CHLORIDE 0.9 % (FLUSH) 0.9 %
10 SYRINGE (ML) INJECTION EVERY 12 HOURS SCHEDULED
OUTPATIENT
Start: 2024-12-05

## 2024-12-05 RX ORDER — KETOROLAC TROMETHAMINE 15 MG/ML
30 INJECTION, SOLUTION INTRAMUSCULAR; INTRAVENOUS ONCE
OUTPATIENT
Start: 2024-12-05 | End: 2024-12-05

## 2024-12-05 RX ORDER — ONDANSETRON 2 MG/ML
4 INJECTION INTRAMUSCULAR; INTRAVENOUS EVERY 6 HOURS PRN
OUTPATIENT
Start: 2024-12-05

## 2024-12-06 LAB
IRON 24H UR-MRATE: 105 MCG/DL (ref 37–145)
IRON SATN MFR SERPL: 21 % (ref 20–50)
TIBC SERPL-MCNC: 495 MCG/DL (ref 298–536)
TRANSFERRIN SERPL-MCNC: 332 MG/DL (ref 200–360)

## 2024-12-11 ENCOUNTER — ROUTINE PRENATAL (OUTPATIENT)
Dept: OBSTETRICS AND GYNECOLOGY | Facility: CLINIC | Age: 34
End: 2024-12-11
Payer: COMMERCIAL

## 2024-12-11 ENCOUNTER — HOSPITAL ENCOUNTER (OUTPATIENT)
Facility: HOSPITAL | Age: 34
Setting detail: SURGERY ADMIT
Discharge: HOME | End: 2024-12-11
Attending: OBSTETRICS & GYNECOLOGY | Admitting: OBSTETRICS & GYNECOLOGY
Payer: COMMERCIAL

## 2024-12-11 ENCOUNTER — HOSPITAL ENCOUNTER (OUTPATIENT)
Dept: LABOR AND DELIVERY | Facility: HOSPITAL | Age: 34
Setting detail: SURGERY ADMIT
Discharge: HOME | End: 2024-12-11
Payer: COMMERCIAL

## 2024-12-11 VITALS — WEIGHT: 210 LBS | BODY MASS INDEX: 32.89 KG/M2 | SYSTOLIC BLOOD PRESSURE: 111 MMHG | DIASTOLIC BLOOD PRESSURE: 76 MMHG

## 2024-12-11 VITALS
RESPIRATION RATE: 18 BRPM | OXYGEN SATURATION: 98 % | SYSTOLIC BLOOD PRESSURE: 107 MMHG | HEART RATE: 92 BPM | DIASTOLIC BLOOD PRESSURE: 65 MMHG

## 2024-12-11 DIAGNOSIS — Z98.891 H/O CESAREAN SECTION: ICD-10-CM

## 2024-12-11 DIAGNOSIS — O43.119 ANTEPARTUM PLACENTA CIRCUMVALLATA: ICD-10-CM

## 2024-12-11 DIAGNOSIS — Z34.80 SUPERVISION OF OTHER NORMAL PREGNANCY, ANTEPARTUM: Primary | ICD-10-CM

## 2024-12-11 DIAGNOSIS — O24.410 DIET CONTROLLED GESTATIONAL DIABETES MELLITUS (GDM), ANTEPARTUM: ICD-10-CM

## 2024-12-11 DIAGNOSIS — O09.523 MULTIGRAVIDA OF ADVANCED MATERNAL AGE IN THIRD TRIMESTER: ICD-10-CM

## 2024-12-11 DIAGNOSIS — I83.892 VARICOSE VEINS OF LEFT LOWER EXTREMITY WITH OTHER COMPLICATIONS: ICD-10-CM

## 2024-12-11 LAB
GLUCOSE UR STRIP-MCNC: NEGATIVE MG/DL
PROT UR STRIP-MCNC: NEGATIVE MG/DL

## 2024-12-11 PROCEDURE — 59025 FETAL NON-STRESS TEST: CPT | Performed by: OBSTETRICS & GYNECOLOGY

## 2024-12-11 PROCEDURE — 59025 FETAL NON-STRESS TEST: CPT

## 2024-12-11 NOTE — NON STRESS TEST
Obstetrical Non-stress Test Interpretation     Name:  Yarely Newman  MRN: [id number]    34 y.o. female  at 39w1d    Indication: gestational diabetes, circumvallate placenta       Fetal Assessment  Fetal HR Assessment Method: external  Fetal HR (beats/min): 135  Fetal HR Baseline: normal range  Fetal HR Variability: moderate (amplitude range 6 to 25 bpm)  Fetal HR Accelerations: greater than/equal to 15 bpm, lasting at least 15 seconds  Fetal HR Decelerations: absent  Sinusoidal Pattern Present: absent    /65 (BP Location: Right arm)   Pulse 92   Resp 18   LMP 2024 (Exact Date)   SpO2 98%     Reason for test: Diabetes (gestational, circumvallate placenta)  Date of Test: 2024  Time frame of test:   RN NST Interpretation: Reactive      Chantelle Esteves RN  2024  16:56 EST

## 2024-12-11 NOTE — PROGRESS NOTES
Routine Prenatal Visit     Subjective  Yarely Newman is a 34 y.o.  at 39w1d here for her routine OB visit.   She is taking her prenatal vitamins.Reports no loss of fluid or vaginal bleeding. Patient doing well.     Pregnancy complicated by:     Patient Active Problem List   Diagnosis    Palpitations    Tachycardia    Obesity (BMI 30-39.9)    Supervision of other normal pregnancy, antepartum    H/O  section    Hx of gestational diabetes in prior pregnancy, currently pregnant    AMA (advanced maternal age) multigravida 35+    Antepartum placenta circumvallata    Diet controlled gestational diabetes mellitus (GDM), antepartum    Varicose veins of left lower extremity with other complications         OB History    Para Term  AB Living   3 1 1   1 1   SAB IAB Ectopic Molar Multiple Live Births             1      # Outcome Date GA Lbr Phu/2nd Weight Sex Type Anes PTL Lv   3 Current            2 Term 2012    M CS-Unspec   SHAYY   1 AB                    ROS:  General ROS: negative for - chills or fatigue  Genito-Urinary ROS: negative for  change in urinary stream, vaginal discharge     Objective  Physical Exam:   Vitals:    24 1528   BP: 111/76       Uterine Size: size equals dates  FHT: 110-160 BPM     General appearance - alert, well appearing, and in no distress  Abdomen- Soft, Gravid uterus, non-tender to palpation  Extremeties: negative swelling       Assessment/Plan:   Diagnoses and all orders for this visit:    1. Supervision of other normal pregnancy, antepartum (Primary)  -     POC Urinalysis Dipstick    2. Antepartum placenta circumvallata    3. H/O  section    4. Diet controlled gestational diabetes mellitus (GDM), antepartum    5. Multigravida of advanced maternal age in third trimester    6. Varicose veins of left lower extremity with other complications            Counseling:   OB precautions, leaking, VB, tristan armstrong vs PTL/Labor  [facility]  CS scheduled  CS reviewed  in detail.  All history reviewed and updated.  Preop exam performed.  See separate scanned in counseling note.  All questions answered.  She desires to proceed as planned.   Round Ligament Pain:  The uterus has several ligaments which provide support and keep the uterus in place. As the  uterus grows these ligaments are pulled and stretched which often causes sharp stabbing like pain in the inguinal area.   You may find a pregnancy support band helpful. Changing positions may also help. Yoga is a great way to cope with round ligament and low back pain in pregnancy.    Massage may also help with low back pain   Things to Consider at this Point in your Pregnancy:  Some women experience swelling in their feet during pregnancy. Compression stockings may help  Drink plenty of water and stay active   Make sure you are eating frequent small meals, nuts are a wonderful snack to keep with you            Return in about 2 weeks (around 12/25/2024) for incision check.      We have gone over prenatal care to include the timing and content of visits. I informed her how to contact the office and/or on call person in the event of any problems and encouraged her to do so when she feels it is necessary.  We then spent time answering her questions which she indicated were answered to her satisfaction.    Kristal Torres,   12/11/2024 15:52 EST

## 2024-12-14 ENCOUNTER — ANESTHESIA EVENT (OUTPATIENT)
Dept: LABOR AND DELIVERY | Facility: HOSPITAL | Age: 34
End: 2024-12-14
Payer: COMMERCIAL

## 2024-12-15 ENCOUNTER — ANESTHESIA (OUTPATIENT)
Dept: LABOR AND DELIVERY | Facility: HOSPITAL | Age: 34
End: 2024-12-15
Payer: COMMERCIAL

## 2024-12-15 ENCOUNTER — HOSPITAL ENCOUNTER (INPATIENT)
Facility: HOSPITAL | Age: 34
LOS: 2 days | Discharge: HOME | End: 2024-12-17
Attending: STUDENT IN AN ORGANIZED HEALTH CARE EDUCATION/TRAINING PROGRAM | Admitting: STUDENT IN AN ORGANIZED HEALTH CARE EDUCATION/TRAINING PROGRAM
Payer: COMMERCIAL

## 2024-12-15 DIAGNOSIS — Z34.80 SUPERVISION OF OTHER NORMAL PREGNANCY, ANTEPARTUM: ICD-10-CM

## 2024-12-15 DIAGNOSIS — Z98.891 H/O CESAREAN SECTION: ICD-10-CM

## 2024-12-15 PROBLEM — Z34.90 NORMAL PREGNANCY: Status: ACTIVE | Noted: 2024-12-15

## 2024-12-15 LAB
ABO GROUP BLD: NORMAL
AMPHET+METHAMPHET UR QL: NEGATIVE
AMPHETAMINES UR QL: NEGATIVE
ATMOSPHERIC PRESS: 750.6 MMHG
ATMOSPHERIC PRESS: 751 MMHG
BARBITURATES UR QL SCN: NEGATIVE
BASE EXCESS BLDCOA CALC-SCNC: -3.3 MMOL/L (ref -2–2)
BASE EXCESS BLDCOV CALC-SCNC: -4.6 MMOL/L (ref -30–30)
BENZODIAZ UR QL SCN: NEGATIVE
BLD GP AB SCN SERPL QL: NEGATIVE
BUPRENORPHINE SERPL-MCNC: NEGATIVE NG/ML
CANNABINOIDS SERPL QL: NEGATIVE
COCAINE UR QL: NEGATIVE
DEPRECATED RDW RBC AUTO: 43.7 FL (ref 37–54)
ERYTHROCYTE [DISTWIDTH] IN BLOOD BY AUTOMATED COUNT: 12.2 % (ref 12.3–15.4)
FENTANYL UR-MCNC: NEGATIVE NG/ML
HCO3 BLDCOA-SCNC: 23.4 MMOL/L
HCO3 BLDCOV-SCNC: 20.1 MMOL/L
HCT VFR BLD AUTO: 37.4 % (ref 34–46.6)
HGB BLD-MCNC: 12.6 G/DL (ref 12–15.9)
MCH RBC QN AUTO: 32.8 PG (ref 26.6–33)
MCHC RBC AUTO-ENTMCNC: 33.7 G/DL (ref 31.5–35.7)
MCV RBC AUTO: 97.4 FL (ref 79–97)
METHADONE UR QL SCN: NEGATIVE
MODALITY: ABNORMAL
MODALITY: NORMAL
OPIATES UR QL: NEGATIVE
OXYCODONE UR QL SCN: NEGATIVE
PCO2 BLDCOA: 47.2 MMHG (ref 33–49)
PCO2 BLDCOV: 35 MM HG (ref 35–51.3)
PCP UR QL SCN: NEGATIVE
PH BLDCOA: 7.3 PH UNITS (ref 7.18–7.34)
PH BLDCOV: 7.37 PH UNITS (ref 7.26–7.4)
PLATELET # BLD AUTO: 246 10*3/MM3 (ref 140–450)
PMV BLD AUTO: 10.6 FL (ref 6–12)
PO2 BLDCOA: 19 MMHG
PO2 BLDCOV: 27.6 MM HG (ref 19–39)
RBC # BLD AUTO: 3.84 10*6/MM3 (ref 3.77–5.28)
RH BLD: POSITIVE
SAO2 % BLDCOA: 24.7 %
SAO2 % BLDCOV: 50.3 %
T&S EXPIRATION DATE: NORMAL
TREPONEMA PALLIDUM IGG+IGM AB [PRESENCE] IN SERUM OR PLASMA BY IMMUNOASSAY: NORMAL
TRICYCLICS UR QL SCN: NEGATIVE
WBC NRBC COR # BLD AUTO: 9.48 10*3/MM3 (ref 3.4–10.8)

## 2024-12-15 PROCEDURE — 25810000003 LACTATED RINGERS PER 1000 ML: Performed by: STUDENT IN AN ORGANIZED HEALTH CARE EDUCATION/TRAINING PROGRAM

## 2024-12-15 PROCEDURE — 25810000003 LACTATED RINGERS SOLUTION: Performed by: STUDENT IN AN ORGANIZED HEALTH CARE EDUCATION/TRAINING PROGRAM

## 2024-12-15 PROCEDURE — 25010000002 KETOROLAC TROMETHAMINE PER 15 MG: Performed by: STUDENT IN AN ORGANIZED HEALTH CARE EDUCATION/TRAINING PROGRAM

## 2024-12-15 PROCEDURE — 88307 TISSUE EXAM BY PATHOLOGIST: CPT | Performed by: STUDENT IN AN ORGANIZED HEALTH CARE EDUCATION/TRAINING PROGRAM

## 2024-12-15 PROCEDURE — 59515 CESAREAN DELIVERY: CPT | Performed by: STUDENT IN AN ORGANIZED HEALTH CARE EDUCATION/TRAINING PROGRAM

## 2024-12-15 PROCEDURE — 25810000003 SODIUM CHLORIDE 0.9 % SOLUTION: Performed by: STUDENT IN AN ORGANIZED HEALTH CARE EDUCATION/TRAINING PROGRAM

## 2024-12-15 PROCEDURE — 86850 RBC ANTIBODY SCREEN: CPT | Performed by: STUDENT IN AN ORGANIZED HEALTH CARE EDUCATION/TRAINING PROGRAM

## 2024-12-15 PROCEDURE — 85027 COMPLETE CBC AUTOMATED: CPT | Performed by: STUDENT IN AN ORGANIZED HEALTH CARE EDUCATION/TRAINING PROGRAM

## 2024-12-15 PROCEDURE — 86901 BLOOD TYPING SEROLOGIC RH(D): CPT | Performed by: STUDENT IN AN ORGANIZED HEALTH CARE EDUCATION/TRAINING PROGRAM

## 2024-12-15 PROCEDURE — 80307 DRUG TEST PRSMV CHEM ANLYZR: CPT | Performed by: STUDENT IN AN ORGANIZED HEALTH CARE EDUCATION/TRAINING PROGRAM

## 2024-12-15 PROCEDURE — 58611 LIGATE OVIDUCT(S) ADD-ON: CPT | Performed by: STUDENT IN AN ORGANIZED HEALTH CARE EDUCATION/TRAINING PROGRAM

## 2024-12-15 PROCEDURE — 88302 TISSUE EXAM BY PATHOLOGIST: CPT | Performed by: STUDENT IN AN ORGANIZED HEALTH CARE EDUCATION/TRAINING PROGRAM

## 2024-12-15 PROCEDURE — 82803 BLOOD GASES ANY COMBINATION: CPT

## 2024-12-15 PROCEDURE — 0UB70ZZ EXCISION OF BILATERAL FALLOPIAN TUBES, OPEN APPROACH: ICD-10-PCS | Performed by: STUDENT IN AN ORGANIZED HEALTH CARE EDUCATION/TRAINING PROGRAM

## 2024-12-15 PROCEDURE — 25010000002 MORPHINE PER 10 MG: Performed by: NURSE ANESTHETIST, CERTIFIED REGISTERED

## 2024-12-15 PROCEDURE — 86780 TREPONEMA PALLIDUM: CPT | Performed by: STUDENT IN AN ORGANIZED HEALTH CARE EDUCATION/TRAINING PROGRAM

## 2024-12-15 PROCEDURE — 25010000002 OXYTOCIN PER 10 UNITS: Performed by: NURSE ANESTHETIST, CERTIFIED REGISTERED

## 2024-12-15 PROCEDURE — 25010000002 CEFAZOLIN PER 500 MG: Performed by: STUDENT IN AN ORGANIZED HEALTH CARE EDUCATION/TRAINING PROGRAM

## 2024-12-15 PROCEDURE — 25010000002 BUPIVACAINE PF 0.75 % SOLUTION: Performed by: NURSE ANESTHETIST, CERTIFIED REGISTERED

## 2024-12-15 PROCEDURE — 25010000002 OXYTOCIN PER 10 UNITS: Performed by: STUDENT IN AN ORGANIZED HEALTH CARE EDUCATION/TRAINING PROGRAM

## 2024-12-15 PROCEDURE — 86900 BLOOD TYPING SEROLOGIC ABO: CPT | Performed by: STUDENT IN AN ORGANIZED HEALTH CARE EDUCATION/TRAINING PROGRAM

## 2024-12-15 DEVICE — SEAL HEMO SURG ARISTA/AH ABS/PWDR 3GM: Type: IMPLANTABLE DEVICE | Site: UTERUS | Status: FUNCTIONAL

## 2024-12-15 RX ORDER — LIDOCAINE HYDROCHLORIDE 10 MG/ML
0.5 INJECTION, SOLUTION EPIDURAL; INFILTRATION; INTRACAUDAL; PERINEURAL ONCE AS NEEDED
Status: DISCONTINUED | OUTPATIENT
Start: 2024-12-15 | End: 2024-12-15 | Stop reason: HOSPADM

## 2024-12-15 RX ORDER — KETOROLAC TROMETHAMINE 30 MG/ML
30 INJECTION, SOLUTION INTRAMUSCULAR; INTRAVENOUS ONCE
Status: DISCONTINUED | OUTPATIENT
Start: 2024-12-15 | End: 2024-12-15

## 2024-12-15 RX ORDER — SODIUM CHLORIDE 9 MG/ML
40 INJECTION, SOLUTION INTRAVENOUS AS NEEDED
Status: DISCONTINUED | OUTPATIENT
Start: 2024-12-15 | End: 2024-12-15 | Stop reason: HOSPADM

## 2024-12-15 RX ORDER — ACETAMINOPHEN 500 MG
1000 TABLET ORAL ONCE
Status: COMPLETED | OUTPATIENT
Start: 2024-12-15 | End: 2024-12-15

## 2024-12-15 RX ORDER — NALOXONE HCL 0.4 MG/ML
0.4 VIAL (ML) INJECTION ONCE AS NEEDED
Status: ACTIVE | OUTPATIENT
Start: 2024-12-15 | End: 2024-12-16

## 2024-12-15 RX ORDER — CALCIUM CARBONATE 500 MG/1
1 TABLET, CHEWABLE ORAL EVERY 4 HOURS PRN
Status: DISCONTINUED | OUTPATIENT
Start: 2024-12-15 | End: 2024-12-17 | Stop reason: HOSPADM

## 2024-12-15 RX ORDER — ACETAMINOPHEN 500 MG
1000 TABLET ORAL ONCE
Status: DISCONTINUED | OUTPATIENT
Start: 2024-12-15 | End: 2024-12-15

## 2024-12-15 RX ORDER — ACETAMINOPHEN 500 MG
1000 TABLET ORAL EVERY 6 HOURS
Status: COMPLETED | OUTPATIENT
Start: 2024-12-15 | End: 2024-12-16

## 2024-12-15 RX ORDER — AMOXICILLIN 250 MG
1 CAPSULE ORAL 2 TIMES DAILY
Status: DISCONTINUED | OUTPATIENT
Start: 2024-12-15 | End: 2024-12-17 | Stop reason: HOSPADM

## 2024-12-15 RX ORDER — ACETAMINOPHEN 325 MG/1
650 TABLET ORAL EVERY 6 HOURS
Status: DISCONTINUED | OUTPATIENT
Start: 2024-12-16 | End: 2024-12-17 | Stop reason: HOSPADM

## 2024-12-15 RX ORDER — HYDROCORTISONE 25 MG/G
CREAM TOPICAL 3 TIMES DAILY PRN
Status: DISCONTINUED | OUTPATIENT
Start: 2024-12-15 | End: 2024-12-17 | Stop reason: HOSPADM

## 2024-12-15 RX ORDER — HYDROMORPHONE HYDROCHLORIDE 2 MG/ML
0.5 INJECTION, SOLUTION INTRAMUSCULAR; INTRAVENOUS; SUBCUTANEOUS
Status: DISCONTINUED | OUTPATIENT
Start: 2024-12-15 | End: 2024-12-15 | Stop reason: HOSPADM

## 2024-12-15 RX ORDER — DIPHENHYDRAMINE HYDROCHLORIDE 50 MG/ML
12.5 INJECTION INTRAMUSCULAR; INTRAVENOUS EVERY 6 HOURS PRN
Status: ACTIVE | OUTPATIENT
Start: 2024-12-15 | End: 2024-12-16

## 2024-12-15 RX ORDER — FAMOTIDINE 10 MG/ML
20 INJECTION, SOLUTION INTRAVENOUS ONCE AS NEEDED
Status: COMPLETED | OUTPATIENT
Start: 2024-12-15 | End: 2024-12-15

## 2024-12-15 RX ORDER — SODIUM CHLORIDE, SODIUM LACTATE, POTASSIUM CHLORIDE, CALCIUM CHLORIDE 600; 310; 30; 20 MG/100ML; MG/100ML; MG/100ML; MG/100ML
150 INJECTION, SOLUTION INTRAVENOUS CONTINUOUS
Status: DISCONTINUED | OUTPATIENT
Start: 2024-12-15 | End: 2024-12-15

## 2024-12-15 RX ORDER — BUPIVACAINE HYDROCHLORIDE 7.5 MG/ML
INJECTION, SOLUTION EPIDURAL; RETROBULBAR
Status: COMPLETED | OUTPATIENT
Start: 2024-12-15 | End: 2024-12-15

## 2024-12-15 RX ORDER — SIMETHICONE 80 MG
80 TABLET,CHEWABLE ORAL 4 TIMES DAILY PRN
Status: DISCONTINUED | OUTPATIENT
Start: 2024-12-15 | End: 2024-12-17 | Stop reason: HOSPADM

## 2024-12-15 RX ORDER — OXYTOCIN 10 [USP'U]/ML
INJECTION, SOLUTION INTRAMUSCULAR; INTRAVENOUS AS NEEDED
Status: DISCONTINUED | OUTPATIENT
Start: 2024-12-15 | End: 2024-12-15 | Stop reason: SURG

## 2024-12-15 RX ORDER — ONDANSETRON 4 MG/1
4 TABLET, ORALLY DISINTEGRATING ORAL EVERY 6 HOURS PRN
Status: DISCONTINUED | OUTPATIENT
Start: 2024-12-15 | End: 2024-12-15 | Stop reason: HOSPADM

## 2024-12-15 RX ORDER — CITRIC ACID/SODIUM CITRATE 334-500MG
30 SOLUTION, ORAL ORAL ONCE
Status: DISCONTINUED | OUTPATIENT
Start: 2024-12-15 | End: 2024-12-15 | Stop reason: HOSPADM

## 2024-12-15 RX ORDER — FAMOTIDINE 10 MG/ML
20 INJECTION, SOLUTION INTRAVENOUS ONCE
Status: DISCONTINUED | OUTPATIENT
Start: 2024-12-15 | End: 2024-12-15

## 2024-12-15 RX ORDER — OXYCODONE HYDROCHLORIDE 5 MG/1
10 TABLET ORAL EVERY 4 HOURS PRN
Status: DISCONTINUED | OUTPATIENT
Start: 2024-12-15 | End: 2024-12-17 | Stop reason: HOSPADM

## 2024-12-15 RX ORDER — IBUPROFEN 800 MG/1
800 TABLET, FILM COATED ORAL EVERY 8 HOURS SCHEDULED
Status: DISCONTINUED | OUTPATIENT
Start: 2024-12-16 | End: 2024-12-17 | Stop reason: HOSPADM

## 2024-12-15 RX ORDER — FAMOTIDINE 20 MG/1
20 TABLET, FILM COATED ORAL ONCE AS NEEDED
Status: DISCONTINUED | OUTPATIENT
Start: 2024-12-15 | End: 2024-12-15 | Stop reason: HOSPADM

## 2024-12-15 RX ORDER — MEPERIDINE HYDROCHLORIDE 25 MG/ML
12.5 INJECTION INTRAMUSCULAR; INTRAVENOUS; SUBCUTANEOUS
Status: DISCONTINUED | OUTPATIENT
Start: 2024-12-15 | End: 2024-12-15 | Stop reason: HOSPADM

## 2024-12-15 RX ORDER — SODIUM CHLORIDE 0.9 % (FLUSH) 0.9 %
10 SYRINGE (ML) INJECTION AS NEEDED
Status: DISCONTINUED | OUTPATIENT
Start: 2024-12-15 | End: 2024-12-15 | Stop reason: HOSPADM

## 2024-12-15 RX ORDER — MORPHINE SULFATE 0.5 MG/ML
INJECTION, SOLUTION EPIDURAL; INTRATHECAL; INTRAVENOUS
Status: COMPLETED | OUTPATIENT
Start: 2024-12-15 | End: 2024-12-15

## 2024-12-15 RX ORDER — KETOROLAC TROMETHAMINE 30 MG/ML
30 INJECTION, SOLUTION INTRAMUSCULAR; INTRAVENOUS EVERY 6 HOURS PRN
Status: DISCONTINUED | OUTPATIENT
Start: 2024-12-15 | End: 2024-12-15

## 2024-12-15 RX ORDER — PHENYLEPHRINE HCL IN 0.9% NACL 1 MG/10 ML
SYRINGE (ML) INTRAVENOUS AS NEEDED
Status: DISCONTINUED | OUTPATIENT
Start: 2024-12-15 | End: 2024-12-15 | Stop reason: SURG

## 2024-12-15 RX ORDER — ONDANSETRON 4 MG/1
4 TABLET, ORALLY DISINTEGRATING ORAL EVERY 6 HOURS PRN
Status: DISCONTINUED | OUTPATIENT
Start: 2024-12-15 | End: 2024-12-17 | Stop reason: HOSPADM

## 2024-12-15 RX ORDER — KETOROLAC TROMETHAMINE 30 MG/ML
15 INJECTION, SOLUTION INTRAMUSCULAR; INTRAVENOUS EVERY 6 HOURS
Status: COMPLETED | OUTPATIENT
Start: 2024-12-15 | End: 2024-12-16

## 2024-12-15 RX ORDER — ONDANSETRON 2 MG/ML
4 INJECTION INTRAMUSCULAR; INTRAVENOUS EVERY 6 HOURS PRN
Status: DISCONTINUED | OUTPATIENT
Start: 2024-12-15 | End: 2024-12-17 | Stop reason: HOSPADM

## 2024-12-15 RX ORDER — IBUPROFEN 800 MG/1
800 TABLET, FILM COATED ORAL EVERY 8 HOURS SCHEDULED
Status: DISCONTINUED | OUTPATIENT
Start: 2024-12-16 | End: 2024-12-15

## 2024-12-15 RX ORDER — KETOROLAC TROMETHAMINE 30 MG/ML
15 INJECTION, SOLUTION INTRAMUSCULAR; INTRAVENOUS EVERY 6 HOURS
Status: DISCONTINUED | OUTPATIENT
Start: 2024-12-15 | End: 2024-12-15

## 2024-12-15 RX ORDER — ONDANSETRON 2 MG/ML
4 INJECTION INTRAMUSCULAR; INTRAVENOUS EVERY 6 HOURS PRN
Status: DISCONTINUED | OUTPATIENT
Start: 2024-12-15 | End: 2024-12-15 | Stop reason: HOSPADM

## 2024-12-15 RX ORDER — HYDROMORPHONE HYDROCHLORIDE 1 MG/ML
0.25 INJECTION, SOLUTION INTRAMUSCULAR; INTRAVENOUS; SUBCUTANEOUS
Status: ACTIVE | OUTPATIENT
Start: 2024-12-15 | End: 2024-12-16

## 2024-12-15 RX ORDER — SODIUM CHLORIDE 0.9 % (FLUSH) 0.9 %
10 SYRINGE (ML) INJECTION EVERY 12 HOURS SCHEDULED
Status: DISCONTINUED | OUTPATIENT
Start: 2024-12-15 | End: 2024-12-15 | Stop reason: HOSPADM

## 2024-12-15 RX ORDER — HYDROMORPHONE HYDROCHLORIDE 2 MG/ML
0.25 INJECTION, SOLUTION INTRAMUSCULAR; INTRAVENOUS; SUBCUTANEOUS
Status: DISCONTINUED | OUTPATIENT
Start: 2024-12-15 | End: 2024-12-15 | Stop reason: HOSPADM

## 2024-12-15 RX ORDER — ENOXAPARIN SODIUM 100 MG/ML
40 INJECTION SUBCUTANEOUS EVERY 24 HOURS
Status: DISCONTINUED | OUTPATIENT
Start: 2024-12-16 | End: 2024-12-17 | Stop reason: HOSPADM

## 2024-12-15 RX ORDER — DIPHENHYDRAMINE HCL 25 MG
25 CAPSULE ORAL EVERY 6 HOURS PRN
Status: ACTIVE | OUTPATIENT
Start: 2024-12-15 | End: 2024-12-16

## 2024-12-15 RX ORDER — NALOXONE HCL 0.4 MG/ML
0.4 VIAL (ML) INJECTION
Status: DISCONTINUED | OUTPATIENT
Start: 2024-12-15 | End: 2024-12-17 | Stop reason: HOSPADM

## 2024-12-15 RX ORDER — OXYTOCIN/0.9 % SODIUM CHLORIDE 30/500 ML
999 PLASTIC BAG, INJECTION (ML) INTRAVENOUS ONCE
Status: DISCONTINUED | OUTPATIENT
Start: 2024-12-15 | End: 2024-12-15 | Stop reason: HOSPADM

## 2024-12-15 RX ORDER — OXYCODONE HYDROCHLORIDE 5 MG/1
5 TABLET ORAL EVERY 4 HOURS PRN
Status: DISCONTINUED | OUTPATIENT
Start: 2024-12-15 | End: 2024-12-17 | Stop reason: HOSPADM

## 2024-12-15 RX ORDER — OXYTOCIN/0.9 % SODIUM CHLORIDE 30/500 ML
250 PLASTIC BAG, INJECTION (ML) INTRAVENOUS CONTINUOUS
Status: ACTIVE | OUTPATIENT
Start: 2024-12-15 | End: 2024-12-15

## 2024-12-15 RX ORDER — ALUMINA, MAGNESIA, AND SIMETHICONE 2400; 2400; 240 MG/30ML; MG/30ML; MG/30ML
15 SUSPENSION ORAL EVERY 4 HOURS PRN
Status: DISCONTINUED | OUTPATIENT
Start: 2024-12-15 | End: 2024-12-17 | Stop reason: HOSPADM

## 2024-12-15 RX ORDER — OXYTOCIN/0.9 % SODIUM CHLORIDE 30/500 ML
125 PLASTIC BAG, INJECTION (ML) INTRAVENOUS CONTINUOUS PRN
Status: DISCONTINUED | OUTPATIENT
Start: 2024-12-15 | End: 2024-12-17 | Stop reason: HOSPADM

## 2024-12-15 RX ORDER — MORPHINE SULFATE 0.5 MG/ML
2 INJECTION, SOLUTION EPIDURAL; INTRATHECAL; INTRAVENOUS EVERY 4 HOURS PRN
Status: DISCONTINUED | OUTPATIENT
Start: 2024-12-15 | End: 2024-12-17 | Stop reason: HOSPADM

## 2024-12-15 RX ORDER — ACETAMINOPHEN 325 MG/1
650 TABLET ORAL ONCE AS NEEDED
Status: DISCONTINUED | OUTPATIENT
Start: 2024-12-15 | End: 2024-12-15 | Stop reason: HOSPADM

## 2024-12-15 RX ORDER — KETOROLAC TROMETHAMINE 30 MG/ML
30 INJECTION, SOLUTION INTRAMUSCULAR; INTRAVENOUS ONCE
Status: COMPLETED | OUTPATIENT
Start: 2024-12-15 | End: 2024-12-15

## 2024-12-15 RX ORDER — FAMOTIDINE 10 MG/ML
20 INJECTION, SOLUTION INTRAVENOUS ONCE AS NEEDED
Status: DISCONTINUED | OUTPATIENT
Start: 2024-12-15 | End: 2024-12-15 | Stop reason: HOSPADM

## 2024-12-15 RX ADMIN — SODIUM CHLORIDE, POTASSIUM CHLORIDE, SODIUM LACTATE AND CALCIUM CHLORIDE 1000 ML: 600; 310; 30; 20 INJECTION, SOLUTION INTRAVENOUS at 09:17

## 2024-12-15 RX ADMIN — FAMOTIDINE 20 MG: 10 INJECTION INTRAVENOUS at 09:10

## 2024-12-15 RX ADMIN — SODIUM CHLORIDE, POTASSIUM CHLORIDE, SODIUM LACTATE AND CALCIUM CHLORIDE: 600; 310; 30; 20 INJECTION, SOLUTION INTRAVENOUS at 09:55

## 2024-12-15 RX ADMIN — Medication 200 MCG: at 09:41

## 2024-12-15 RX ADMIN — KETOROLAC TROMETHAMINE 15 MG: 30 INJECTION, SOLUTION INTRAMUSCULAR; INTRAVENOUS at 18:07

## 2024-12-15 RX ADMIN — OXYTOCIN 40 UNITS: 10 INJECTION, SOLUTION INTRAMUSCULAR; INTRAVENOUS at 09:56

## 2024-12-15 RX ADMIN — MORPHINE SULFATE 0.15 MG: 0.5 INJECTION, SOLUTION EPIDURAL; INTRATHECAL; INTRAVENOUS at 09:34

## 2024-12-15 RX ADMIN — ACETAMINOPHEN 1000 MG: 500 TABLET ORAL at 21:02

## 2024-12-15 RX ADMIN — ACETAMINOPHEN 1000 MG: 500 TABLET ORAL at 09:10

## 2024-12-15 RX ADMIN — OXYTOCIN 250 ML/HR: 10 INJECTION, SOLUTION INTRAMUSCULAR; INTRAVENOUS at 12:00

## 2024-12-15 RX ADMIN — SENNOSIDES AND DOCUSATE SODIUM 1 TABLET: 50; 8.6 TABLET ORAL at 21:02

## 2024-12-15 RX ADMIN — SODIUM CHLORIDE, POTASSIUM CHLORIDE, SODIUM LACTATE AND CALCIUM CHLORIDE: 600; 310; 30; 20 INJECTION, SOLUTION INTRAVENOUS at 09:17

## 2024-12-15 RX ADMIN — SODIUM CHLORIDE 2 G: 9 INJECTION, SOLUTION INTRAVENOUS at 09:10

## 2024-12-15 RX ADMIN — ACETAMINOPHEN 1000 MG: 500 TABLET ORAL at 15:44

## 2024-12-15 RX ADMIN — BUPIVACAINE HYDROCHLORIDE 1.7 ML: 7.5 INJECTION, SOLUTION EPIDURAL; RETROBULBAR at 09:34

## 2024-12-15 RX ADMIN — KETOROLAC TROMETHAMINE 30 MG: 30 INJECTION, SOLUTION INTRAMUSCULAR; INTRAVENOUS at 12:45

## 2024-12-15 NOTE — H&P
LESLYE Jackson  Obstetric History and Physical    Chief Complaint   Patient presents with    Scheduled        Subjective     Patient is a 34 y.o. female  currently at 39w5d, who presents for scheduled repeat  section and bilateral salpingectomy.    Her prenatal care is complicated by  diabetes  GDM A1, advanced maternal age  genetic screening was normal, prior   desires repeat , and desires sterlization  valid consents currently available.  Her previous obstetric/gynecological history is noted for is non-contributory.    The following portions of the patients history were reviewed and updated as appropriate: current medications, allergies, past medical history, past surgical history, past family history, past social history, and problem list .       Prenatal Information:  Prenatal Results       Initial Prenatal Labs       Test Value Reference Range Date Time    Hemoglobin  12.1 g/dL 12.0 - 15.9 24 1527       12.8 g/dL 12.0 - 15.9 24 0932    Hematocrit  35.9 % 34.0 - 46.6 24 1527       37.7 % 34.0 - 46.6 24 0932    Platelets  279 10*3/mm3 140 - 450 24 1527       330 10*3/mm3 140 - 450 24 0932    Rubella IgG  1.05 index Immune >0.99 24 1527    Hepatitis B SAg  Non-Reactive  Non-Reactive 24 1527    Hepatitis C Ab  Non-Reactive  Non-Reactive 24 1527    RPR  Non Reactive  Non Reactive 24 1603    T. Pallidum Ab   Non-Reactive  Non-Reactive 24 1527    ABO  A   24 1527    Rh  Positive   24 1527    Antibody Screen  Negative   24 1527    HIV  Non-Reactive  Non-Reactive 24 1527    Urine Culture  No growth   24 1537    Gonorrhea  Negative  Negative 24 1537    Chlamydia  Negative  Negative 24 1537    TSH        HgB A1c   5.50 % 4.80 - 5.60 24 1527       5.60 % 4.80 - 5.60 24 0932    Varicella IgG        Hemoglobinopathy Fractionation  Comment   24 1527     Hemoglobinopathy (genetic testing)        Cystic fibrosis         Spinal muscular atrophy        Fragile X                  Fetal testing        Test Value Reference Range Date Time    NIPT        MSAFP        AFP-4                  2nd and 3rd Trimester       Test Value Reference Range Date Time    Hemoglobin (repeated)  12.6 g/dL 12.0 - 15.9 12/15/24 0803       12.5 g/dL 12.0 - 15.9 12/05/24 1603       10.9 g/dL 12.0 - 15.9 08/27/24 1603    Hematocrit (repeated)  37.4 % 34.0 - 46.6 12/15/24 0803       36.0 % 34.0 - 46.6 12/05/24 1603       32.1 % 34.0 - 46.6 08/27/24 1603    Platelets   246 10*3/mm3 140 - 450 12/15/24 0803       260 10*3/mm3 140 - 450 12/05/24 1603       251 10*3/mm3 140 - 450 08/27/24 1603       279 10*3/mm3 140 - 450 06/27/24 1527       330 10*3/mm3 140 - 450 04/25/24 0932    1 hour GTT   190 mg/dL 65 - 139 08/27/24 1603    Antibody Screen (repeated)        3rd TM syphilis scrn (repeated)  RPR         3rd TM syphilis scrn (repeated) TP-Ab        3rd TM syphilis screen TB-Ab (FTA)        Syphilis cascade test TP-Ab (EIA)        Syphilis cascade TPPA        GTT Fasting        GTT 1 Hr        GTT 2 Hr        GTT 3 Hr        Group B Strep  Negative  Negative 11/20/24 1636              Other testing        Test Value Reference Range Date Time    Parvo IgG         CMV IgG                   Drug Screening       Test Value Reference Range Date Time    Amphetamine Screen  Negative  Negative 12/15/24 0803    Barbiturate Screen  Negative  Negative 12/15/24 0803       Negative  Negative 06/27/24 1537    Benzodiazepine Screen  Negative  Negative 12/15/24 0803       Negative  Negative 06/27/24 1537    Methadone Screen  Negative  Negative 12/15/24 0803       Negative  Negative 06/27/24 1537    Phencyclidine Screen  Negative  Negative 12/15/24 0803    Opiates Screen  Negative  Negative 12/15/24 0803       Negative  Negative 06/27/24 1537    THC Screen  Negative  Negative 12/15/24 0803       Negative  Negative  06/27/24 1537    Cocaine Screen  Negative  Negative 12/15/24 0803       Negative  Negative 06/27/24 1537    Propoxyphene Screen        Buprenorphine Screen  Negative  Negative 12/15/24 0803    Methamphetamine Screen  Negative  Negative 12/15/24 0803    Oxycodone Screen  Negative  Negative 12/15/24 0803       Negative  Negative 06/27/24 1537    Tricyclic Antidepressants Screen  Negative  Negative 12/15/24 0803              Legend    ^: Historical                          External Prenatal Results       Pregnancy Outside Results - Transcribed From Office Records - See Scanned Records For Details       Test Value Date Time    ABO  A  06/27/24 1527    Rh  Positive  06/27/24 1527    Antibody Screen  Negative  06/27/24 1527    Varicella IgG       Rubella  1.05 index 06/27/24 1527    Hgb  12.6 g/dL 12/15/24 0803       12.5 g/dL 12/05/24 1603       10.9 g/dL 08/27/24 1603       12.1 g/dL 06/27/24 1527       12.8 g/dL 04/25/24 0932    Hct  37.4 % 12/15/24 0803       36.0 % 12/05/24 1603       32.1 % 08/27/24 1603       35.9 % 06/27/24 1527       37.7 % 04/25/24 0932    HgB A1c   5.50 % 06/27/24 1527       5.60 % 04/25/24 0932    1h GTT  190 mg/dL 08/27/24 1603    3h GTT Fasting       3h GTT 1 hour       3h GTT 2 hour       3h GTT 3 hour        Gonorrhea (discrete)  Negative  06/27/24 1537    Chlamydia (discrete)  Negative  06/27/24 1537    RPR  Non Reactive  08/27/24 1603    Syphils cascade: TP-Ab (FTA)  Non-Reactive  06/27/24 1527    TP-Ab  Non-Reactive  06/27/24 1527    TP-Ab (EIA)       TPPA       HBsAg  Non-Reactive  06/27/24 1527    Herpes Simplex Virus PCR       Herpes Simplex VIrus Culture       HIV  Non-Reactive  06/27/24 1527    Hep C RNA Quant PCR       Hep C Antibody  Non-Reactive  06/27/24 1527    AFP       NIPT       Cystic Fibrosis ([place])       Cystic Fibroisis        Spinal Muscular atrophy       Fragile X       Group B Strep  Negative  11/20/24 1636    GBS Susceptibility to Clindamycin       GBS  Susceptibility to Erythromycin       Fetal Fibronectin       Genetic Testing, Maternal Blood                 Drug Screening       Test Value Date Time    Urine Drug Screen       Amphetamine Screen  Negative  12/15/24 0803    Barbiturate Screen  Negative  12/15/24 0803       Negative  24 1537    Benzodiazepine Screen  Negative  12/15/24 0803       Negative  24 1537    Methadone Screen  Negative  12/15/24 0803       Negative  24 1537    Phencyclidine Screen  Negative  12/15/24 0803    Opiates Screen  Negative  12/15/24 0803       Negative  24 1537    THC Screen  Negative  12/15/24 0803       Negative  24 1537    Cocaine Screen       Propoxyphene Screen       Buprenorphine Screen  Negative  12/15/24 0803    Methamphetamine Screen       Oxycodone Screen  Negative  12/15/24 0803       Negative  24 1537    Tricyclic Antidepressants Screen  Negative  12/15/24 0803              Legend    ^: Historical                             Past OB History:     OB History    Para Term  AB Living   3 1 1 0 1 1   SAB IAB Ectopic Molar Multiple Live Births   0 0 0 0 0 1      # Outcome Date GA Lbr Phu/2nd Weight Sex Type Anes PTL Lv   3 Current            2 Term 2012    M CS-Unspec   SHAYY   1 AB                Past Medical History: Past Medical History:   Diagnosis Date    Abnormal ECG     Anxiety     Arrhythmia     Heart palpitations     Hyperlipidemia     Migraine     Polycystic ovary syndrome       Past Surgical History Past Surgical History:   Procedure Laterality Date    ANKLE SURGERY Left      SECTION        Family History: Family History   Problem Relation Age of Onset    Mental illness Father     Drug abuse Father     Alcohol abuse Father     Depression Father     Anxiety disorder Father     Hyperlipidemia Father     Hypertension Father     Diabetes Father     Liver disease Mother     Heart disease Mother     Depression Mother     Anxiety disorder Mother     Hyperlipidemia  Mother     Hypertension Mother     Diabetes Mother     Alcohol abuse Mother     Drug abuse Mother     Breast cancer Sister     Mental illness Sister     Thyroid disease Maternal Aunt     Hyperlipidemia Maternal Aunt     Hypertension Maternal Aunt     Hyperlipidemia Maternal Aunt     Hypertension Maternal Aunt     Ovarian cancer Neg Hx     Uterine cancer Neg Hx     Colon cancer Neg Hx     Deep vein thrombosis Neg Hx     Pulmonary embolism Neg Hx       Social History:  reports that she quit smoking about 3 years ago. Her smoking use included cigarettes. She started smoking about 18 years ago. She has a 15 pack-year smoking history. She has never used smokeless tobacco.   reports that she does not currently use alcohol.   reports no history of drug use.        General ROS: Pertinent items are noted in HPI    Objective       Vital Signs Range for the last 24 hours  Temperature:     Temp Source:     BP:     Pulse:     Respirations:     SPO2:     O2 Amount (l/min):     O2 Devices     Weight:       Physical Examination: General appearance - alert, well appearing, and in no distress  Mental status - alert, oriented to person, place, and time  Chest - no labored breathing  Abdomen - soft, nontender, nondistended, gravid  Extremities - peripheral pulses normal, no pedal edema      Presentation: Vertex   Cervix: Exam by:     Dilation:     Effacement:     Station:         Fetal Heart Rate Assessment   Method:     Beats/min:     Baseline:     Variability:     Accels:     Decels:     Tracing Category:       Uterine Assessment   Method:     Frequency (min):     Ctx Count in 10 min:     Duration:     Intensity:                 [place] Units:       Lab Results   Component Value Date    STREPGPB Negative 2024           Assessment & Plan       Normal pregnancy        Assessment:  1.  Intrauterine pregnancy at 39w5d gestation with reactive, reassuring fetal status.    2.  History of  section, desires repeat  3.   AMA  4.  GDM A1  5.  Circumvallate placenta  6.  Desires sterilization  GBS status:   Group B Strep, DNA   Date Value Ref Range Status   2024 Negative Negative Final       Plan:  1.  with Tubal scheduled  2. Plan of care has been reviewed with patient and patient agrees.   3.  Risks, benefits of treatment plan have been discussed.  4.  All questions have been answered.     Risks and benefits and alternatives of surgery were discussed with patient.  Risks are not limited to anesthesia, bleeding, blood transfusion, infection, damage to surrounding organs, wound separation, re-operation, thromboembolic disease, hysterectomy, or death.    The patient was counseled on the risks, benefits and alternatives of bilateral salpingectomy.  Risks reviewed, but are not limited to: anesthesia, bleeding, transfusion, infection, damage to organs, reoperation, wound separation, blood clots, and death.  Specifically with bilateral salpingectomy, she understands that this procedure is non-reversible and she will no longer be able to have children naturally. She understands and is done with childbearing.  She also understands approximately 10% of women will notice a heavier and more uncomfortable period. She declines any non-permanent or non-surgical contraception options to include those with similar efficacy and no surgical risk.  All her questions have been answered to her satisfaction and she desires to proceed.          Kristal Torres DO  12/15/2024  08:46 EST

## 2024-12-15 NOTE — OP NOTE
OB/GYN HOSPITALIST  DELIVERY ASSIST NOTE     I was responsible for performing the following activities: Suction, retraction, irrigation, closing, applying fundal pressure for delivery of the fetus, and my skilled assistance was necessary for the success of this case.     Brandie Miguel MD

## 2024-12-15 NOTE — ANESTHESIA PROCEDURE NOTES
Spinal Block      Patient reassessed immediately prior to procedure    Patient location during procedure: OB  Start Time: 12/15/2024 9:30 AM  Stop Time: 12/15/2024 9:39 AM  Indication:at surgeon's request and post-op pain management  Performed By  HODAN/CAA: William Weiss, HODAN  Preanesthetic Checklist  Completed: patient identified, IV checked, risks and benefits discussed, surgical consent, monitors and equipment checked, pre-op evaluation and timeout performed  Spinal Block Prep:  Patient Position:sitting  Sterile Tech:cap, gloves, mask and sterile barriers  Prep:Betadine  Patient Monitoring:blood pressure monitoring, continuous pulse oximetry and EKG    Spinal Block Procedure  Approach:midline  Guidance:landmark technique and palpation technique  Location:L3-L4  Needle Type:Pencan  Needle Gauge:24 G  Placement of Spinal needle event:cerebrospinal fluid aspirated  Paresthesia: no  Fluid Appearance:clear  Medications: bupivacaine PF (MARCAINE) injection 0.75% - Intrathecal   1.7 mL - 12/15/2024 9:34:00 AM  morphine PF (DURAMORPH) injection - Intrathecal   0.15 mg - 12/15/2024 9:34:00 AM   Post Assessment  Patient Tolerance:patient tolerated the procedure well with no apparent complications  Complications no  Additional Notes  Pt supine with KURTIS

## 2024-12-15 NOTE — ANESTHESIA PREPROCEDURE EVALUATION
Anesthesia Evaluation     Patient summary reviewed and Nursing notes reviewed   no history of anesthetic complications:   NPO Solid Status: > 8 hours  NPO Liquid Status: > 2 hours           Airway   Mallampati: II  TM distance: >3 FB  Neck ROM: full  No difficulty expected  Dental - normal exam     Pulmonary - negative pulmonary ROS and normal exam    breath sounds clear to auscultation  Cardiovascular - negative cardio ROS and normal exam  Exercise tolerance: good (4-7 METS)    Rhythm: regular  Rate: normal        Neuro/Psych- negative ROS  GI/Hepatic/Renal/Endo    (+) obesity, GERD well controlled, diabetes mellitus gestational    Musculoskeletal (-) negative ROS    Abdominal   (+) obese   Substance History - negative use     OB/GYN    (+) Pregnant        Other - negative ROS       ROS/Med Hx Other: PAT Nursing Notes unavailable.               Anesthesia Plan    ASA 2 - emergent     epidural       Anesthetic plan, risks, benefits, and alternatives have been provided, discussed and informed consent has been obtained with: patient.    Plan discussed with CRNA.    CODE STATUS:    Code Status (Patient has no pulse and is not breathing): CPR (Attempt to Resuscitate)  Medical Interventions (Patient has pulse or is breathing): Full

## 2024-12-15 NOTE — L&D DELIVERY NOTE
SECTION REPEAT WITH TUBAL  Procedure Report    Patient Name:  Yarely Newman  YOB: 1990    Date of Surgery:  12/15/2024     Indications: History of  section    Pre-op Diagnosis:   39 weeks 5 days gestation  History of prior  section  AMA  GDM A1  Desires permanent sterilization       Post-Op Diagnosis Codes:  Same as above  Live female infant    Procedure/CPT® Codes:      Procedure(s):   SECTION REPEAT WITH bilateral salpingectomy        Staff:  Surgeon(s):  Kristal Torres DO Bergin, Ashlee B, MD         Anesthesia: Choice    Estimated Blood Loss:  500cc    Implants:    Implant Name Type Inv. Item Serial No.  Lot No. LRB No. Used Action   SEAL HEMO SURG ELKE/AH ABS/PWDR 3GM - [id number] Implant SEAL HEMO SURG ELKE/AH ABS/PWDR 3GM  MEDAF[employer] HEMOSTATIS POLYMER [employer]  N/A 1 Implanted       Specimen:          None        Findings: Normal-appearing uterus, bilateral fallopian tubes and ovaries, live female infant    Complications: None    Description of Procedure:   The patient was taken to the operating room where a time out was performed to confirm correct patient and correct procedure. Ancef 2g was administered and spinal anesthesia established. The patient was then placed in supine position with left lateral uterine displacement. A Pruitt catheter was inserted into the bladder with return of clear yellow urine. The patient was then prepped and draped in the normal sterile fashion for a low transverse skin incision.An incision was made in the skin with a surgical scalpel. Sharp and blunt dissection was used to dissect over subsequent layers of tissue. The fascia was incised at midline and the fascial incision was extended bilaterally using blunt and sharp dissection with Santoro scissors.  The superior edge of the fascia was grasped and dissected off of the rectus muscles using blunt and sharp dissection with Santoro scissors.  The inferior edge of the  fascia was then grasped and dissected off using blunt and sharp dissection with Santoro scissors.  The rectus muscles were then divided at midline. The peritoneum was identified and sharply entered using Metzenbaum scissors at its superior margin taking care to avoid at the bladder. The bladder blade was inserted.  The peritoneum was extended laterally using the Bovie electrocautery.  A bladder flap was formed using Metzenbaum scissors and blunt dissection.  The bladder blade was reinserted.  A transverse incision was made in the lower uterine segment using the scalpel. The uterine incision was extended in a cephalocaudad direction using blunt dissection. The amniotic sac was entered and the amniotic fluid was noted to be clear. The surgeon's hand was placed into the uterine cavity. The fetal head was identified elevated into the abdomen and delivered through the uterine incision with the assistance of fundal pressure. The infant was examined for nuchal cord. No nuchal cord identified. The infant was then delivered with traction and the assistance of fundal pressure. The infant's oral and nasal passages were bulb suction.  Vigorous cry was noted at delivery.  On delivery, the cord was clamped x2 and cut. The infant was then passed off the table to the  team for further care. Cord gases and cord blood were obtained. The placenta delivered intact with manual expression with a three-vessel cord. Oxytocin was administered by IV infusion to enhance uterine contraction. The uterus was exteriorized and cleared of all clots and remaining products of conception. The uterine incision was reapproximated using 0 Vicryl in a running locked fashion. Non-hemostatic area in the midline and left lateral edge of the hysterotomy were reinforced using 0-Vicryl in figure-of-eight stitches.  After the identification of both fallopian tubes and their fimbriated ends, a Henagar clamp was used to grasp the right fallopian tube at the  ampulla. Starting at the distal fimbriated end of the tube, the Ligasure device was used to coagulated and transected along the mesosalpinx adjacent to the fallopian tube. The fallopian tube was transected at the cornua and sent for pathology. The identical procedure was performed on the left side and hemostasis was noted bilaterally.  The abdomen pelvis was irrigated and suctioned.  The uterus was replaced back into the abdomen without difficulties.  The paracolic gutters were cleared of any remaining blood clots.  The serosal edges around the hysterotomy were made hemostatic with the Bovie electrocautery.  Final inspection of hysterotomy reveals good hemostasis.  Piero was applied to the surgical site.  The fascia was reapproximated using 0 Vicryl in a running nonlocked fashion. The subcutaneous adipose tissue was closed with a running horizontal stitch using 3-0 plain gut suture. The skin was reapproximated using 4-0 Vicryl in a running subcuticular stitch.  At the end of the procedure, the uterus was expressed for any remaining blood clots.  The incision was covered with a sterile dressing. All needle, sponge, and instrument counts were noted to be correct x2 at the end of the procedure. The patient tolerated the procedure well and was transferred to the recovery room in stable condition.         Dr. Miguel was responsible for performing the following activities: Retraction, Suction, Irrigation, and Delivery of Fetus and their skilled assistance was necessary for the success of this case.    Kristal Torres,      Date: 12/15/2024  Time: 11:11 EST

## 2024-12-15 NOTE — DISCHARGE SUMMARY
[facility]  Delivery Discharge Summary    Patient Name: Yarely Newman  : 1990  MRN: [id number]    Date of Admission: 12/15/2024  Date of Discharge:  2024   Primary Care Physician: Hien Estevez, APRN  OB Clinician: Kristal Torres DO    Procedures:  12/15/2024 - , Low Transverse     Admitting Diagnosis:  Normal pregnancy [Z34.90]    Discharge Diagnosis:  Same as Admitting plus:   Pregnancy at 39w5d - Delivered     Antepartum complications: AMA, GDMA1    Delivery:   Date of Delivery: 12/15/2024  Time of Delivery: 9:55 AM  Delivered By:  Kristal Torres  Delivery Type: , Low Transverse   Anesthesia: Spinal   Intrapartum complications: None  Placenta: Expressed      Baby:  female infant;   Apgar:  9  @ 1 minute /   Apgar:  9  @ 5 minutes   Weight: 3860 g (8 lb 8.2 oz)   Length: 20.5    Feeding method: Breastfeeding Status: Yes    Discharge Details:     Discharge Medications        New Medications        Instructions Start Date   docusate sodium 100 MG capsule  Commonly known as: Colace   100 mg, Oral, 2 Times Daily PRN      Enoxaparin Sodium 40 MG/0.4ML solution prefilled syringe syringe  Commonly known as: LOVENOX   40 mg, Subcutaneous, Every 24 Hours Scheduled      ibuprofen 800 MG tablet  Commonly known as: ADVIL,MOTRIN   800 mg, Oral, Every 8 Hours PRN      oxyCODONE 5 MG immediate release tablet  Commonly known as: ROXICODONE   5 mg, Oral, Every 6 Hours PRN             Continue These Medications        Instructions Start Date   Alcohol Swabs pads   Use to clean fingers before checking BS 4 times per day and PRN      cholecalciferol 25 MCG (1000 UT) tablet  Commonly known as: VITAMIN D3   1,000 Units, 2 Times Daily      FreeStyle Lite w/Device kit   1 each, 4 Times Daily      glucose blood test strip   Check BS 4x/day as instructed.      Lancets misc   120 each, Not Applicable, 4 Times Daily, Check blood sugars four times daily as directed      prenatal (CLASSIC) vitamin 28-0.8  MG tablet tablet  Generic drug: prenatal vitamin   Daily      vitamin B-12 500 MCG tablet  Commonly known as: CYANOCOBALAMIN   500 mcg, Daily               Allergies   Allergen Reactions    Erythromycin Hives       Discharge Disposition:   Home, self-care    Discharge Condition:  Good    Follow Up:  Future Appointments   Date Time Provider Department Center   1/2/2025 11:00 AM Kristal Torres DO [facility]         Plan:  Follow up 2 weeks for postpartum appt    Electronically signed by Kristal Torres DO, 12/15/24, 11:13 AM EST.

## 2024-12-16 LAB
BASOPHILS # BLD AUTO: 0.04 10*3/MM3 (ref 0–0.2)
BASOPHILS NFR BLD AUTO: 0.3 % (ref 0–1.5)
DEPRECATED RDW RBC AUTO: 45.1 FL (ref 37–54)
EOSINOPHIL # BLD AUTO: 0.07 10*3/MM3 (ref 0–0.4)
EOSINOPHIL NFR BLD AUTO: 0.5 % (ref 0.3–6.2)
ERYTHROCYTE [DISTWIDTH] IN BLOOD BY AUTOMATED COUNT: 12.2 % (ref 12.3–15.4)
HCT VFR BLD AUTO: 34.1 % (ref 34–46.6)
HGB BLD-MCNC: 11 G/DL (ref 12–15.9)
IMM GRANULOCYTES # BLD AUTO: 0.05 10*3/MM3 (ref 0–0.05)
IMM GRANULOCYTES NFR BLD AUTO: 0.4 % (ref 0–0.5)
LYMPHOCYTES # BLD AUTO: 1.89 10*3/MM3 (ref 0.7–3.1)
LYMPHOCYTES NFR BLD AUTO: 13.8 % (ref 19.6–45.3)
MCH RBC QN AUTO: 32.4 PG (ref 26.6–33)
MCHC RBC AUTO-ENTMCNC: 32.3 G/DL (ref 31.5–35.7)
MCV RBC AUTO: 100.6 FL (ref 79–97)
MONOCYTES # BLD AUTO: 1.21 10*3/MM3 (ref 0.1–0.9)
MONOCYTES NFR BLD AUTO: 8.9 % (ref 5–12)
NEUTROPHILS NFR BLD AUTO: 10.4 10*3/MM3 (ref 1.7–7)
NEUTROPHILS NFR BLD AUTO: 76.1 % (ref 42.7–76)
NRBC BLD AUTO-RTO: 0 /100 WBC (ref 0–0.2)
PLATELET # BLD AUTO: 219 10*3/MM3 (ref 140–450)
PMV BLD AUTO: 10.7 FL (ref 6–12)
RBC # BLD AUTO: 3.39 10*6/MM3 (ref 3.77–5.28)
WBC NRBC COR # BLD AUTO: 13.66 10*3/MM3 (ref 3.4–10.8)

## 2024-12-16 PROCEDURE — 25010000002 ENOXAPARIN PER 10 MG: Performed by: STUDENT IN AN ORGANIZED HEALTH CARE EDUCATION/TRAINING PROGRAM

## 2024-12-16 PROCEDURE — 25010000002 KETOROLAC TROMETHAMINE PER 15 MG: Performed by: STUDENT IN AN ORGANIZED HEALTH CARE EDUCATION/TRAINING PROGRAM

## 2024-12-16 PROCEDURE — 85025 COMPLETE CBC W/AUTO DIFF WBC: CPT | Performed by: STUDENT IN AN ORGANIZED HEALTH CARE EDUCATION/TRAINING PROGRAM

## 2024-12-16 RX ORDER — OXYCODONE HYDROCHLORIDE 5 MG/1
5 TABLET ORAL EVERY 6 HOURS PRN
Qty: 7 TABLET | Refills: 0 | Status: ON HOLD | OUTPATIENT
Start: 2024-12-16 | End: 2024-12-20

## 2024-12-16 RX ORDER — IBUPROFEN 800 MG/1
800 TABLET, FILM COATED ORAL EVERY 8 HOURS PRN
Qty: 30 TABLET | Refills: 1 | Status: SHIPPED | OUTPATIENT
Start: 2024-12-16

## 2024-12-16 RX ORDER — DOCUSATE SODIUM 100 MG/1
100 CAPSULE, LIQUID FILLED ORAL 2 TIMES DAILY PRN
Qty: 60 CAPSULE | Refills: 0 | Status: SHIPPED | OUTPATIENT
Start: 2024-12-16 | End: 2024-12-26 | Stop reason: HOSPADM

## 2024-12-16 RX ADMIN — ACETAMINOPHEN 650 MG: 325 TABLET ORAL at 16:02

## 2024-12-16 RX ADMIN — SENNOSIDES AND DOCUSATE SODIUM 1 TABLET: 50; 8.6 TABLET ORAL at 09:37

## 2024-12-16 RX ADMIN — SENNOSIDES AND DOCUSATE SODIUM 1 TABLET: 50; 8.6 TABLET ORAL at 22:06

## 2024-12-16 RX ADMIN — KETOROLAC TROMETHAMINE 15 MG: 30 INJECTION, SOLUTION INTRAMUSCULAR; INTRAVENOUS at 01:06

## 2024-12-16 RX ADMIN — IBUPROFEN 800 MG: 800 TABLET, FILM COATED ORAL at 14:54

## 2024-12-16 RX ADMIN — KETOROLAC TROMETHAMINE 15 MG: 30 INJECTION, SOLUTION INTRAMUSCULAR; INTRAVENOUS at 05:47

## 2024-12-16 RX ADMIN — ENOXAPARIN SODIUM 40 MG: 100 INJECTION SUBCUTANEOUS at 12:29

## 2024-12-16 RX ADMIN — ACETAMINOPHEN 650 MG: 325 TABLET ORAL at 22:06

## 2024-12-16 RX ADMIN — ACETAMINOPHEN 1000 MG: 500 TABLET ORAL at 09:37

## 2024-12-16 RX ADMIN — ACETAMINOPHEN 1000 MG: 500 TABLET ORAL at 03:48

## 2024-12-16 RX ADMIN — IBUPROFEN 800 MG: 800 TABLET, FILM COATED ORAL at 22:06

## 2024-12-16 NOTE — LACTATION NOTE
This note was copied from a baby's chart.  Initial visit with pt, discussed attempting to feed baby every 2-3 hours but allowing unlimited access to breast with unlimited time on breast. LC discussed normal  feeding behavior during the first few days of breastfeeding. I went over waking techniques and how to keep baby awake at breast. Encouraged pt to call out as needed for LC/staff assistance.Pt states that she may exclusively pump due to insufficient latch and nipple soreness. Encouraged to do awake, skin to skin as much as possible especially prior to pumping. Encouraged to double pump every 2-3 hours for adequate stimulation for establishing milk supply, pt has a home pump and states she knows how to use it discussed cleaning and milk storage in hospital and at home. Discussed what to expect over the next few days as pumping is established. LC assisted with pumping for this session. LC encouraged pt to call for assistance as needed.

## 2024-12-16 NOTE — PROGRESS NOTES
Postpartum  Section       POD# 1    Yarely Newman is a 34 y.o.  who underwent a  sectionRLTCS with BS She complains of appropriate pain per palpation that is controlled by pain meds. Patient is urinating appropriately.  Patient is eating without any N/V. Patient states lochia is light. Patient is ambulating without any difficulty. Patient is breastfeed      I/O last 3 completed shifts:  In: 1300 [I.V.:1300]  Out: 2850 [Urine:2350; Blood:500]  No intake/output data recorded.    Physical Exam:  HEENT:Normocephalic and atraumatic, NAD  Lungs: No labored breathing  Abdomen: Soft, appropriate tenderness to palpation, Uterine fundus firm and below the umbilicus  Wound:  Clean, Dry and Intact, negative drainage   Extremities: Negative swelling or cyanosis, negative clonus    Recent Results (from the past 48 hours)   Type & Screen    Collection Time: 12/15/24  8:03 AM    Specimen: Blood   Result Value Ref Range    ABO Type A     RH type Positive     Antibody Screen Negative     T&S Expiration Date 2024 11:59:59 PM    CBC (No Diff)    Collection Time: 12/15/24  8:03 AM    Specimen: Blood   Result Value Ref Range    WBC 9.48 3.40 - 10.80 10*3/mm3    RBC 3.84 3.77 - 5.28 10*6/mm3    Hemoglobin 12.6 12.0 - 15.9 g/dL    Hematocrit 37.4 34.0 - 46.6 %    MCV 97.4 (H) 79.0 - 97.0 fL    MCH 32.8 26.6 - 33.0 pg    MCHC 33.7 31.5 - 35.7 g/dL    RDW 12.2 (L) 12.3 - 15.4 %    RDW-SD 43.7 37.0 - 54.0 fl    MPV 10.6 6.0 - 12.0 fL    Platelets 246 140 - 450 10*3/mm3   Treponema pallidum AB w/Reflex RPR    Collection Time: 12/15/24  8:03 AM    Specimen: Blood   Result Value Ref Range    Treponemal AB Total Non-Reactive Non-Reactive   Urine Drug Screen - Urine, Clean Catch    Collection Time: 12/15/24  8:03 AM    Specimen: Urine, Clean Catch   Result Value Ref Range    THC, Screen, Urine Negative Negative    Phencyclidine (PCP), Urine Negative Negative    Cocaine Screen, Urine Negative Negative     Methamphetamine, Ur Negative Negative    Opiate Screen Negative Negative    Amphetamine Screen, Urine Negative Negative    Benzodiazepine Screen, Urine Negative Negative    Tricyclic Antidepressants Screen Negative Negative    Methadone Screen, Urine Negative Negative    Barbiturates Screen, Urine Negative Negative    Oxycodone Screen, Urine Negative Negative    Buprenorphine, Screen, Urine Negative Negative   Fentanyl, Urine - Urine, Clean Catch    Collection Time: 12/15/24  8:03 AM    Specimen: Urine, Clean Catch   Result Value Ref Range    Fentanyl, Urine Negative Negative   Blood Gas, Arterial, Cord    Collection Time: 12/15/24 10:12 AM    Specimen: Umbilical Cord; Cord Blood Arterial   Result Value Ref Range    pH, Cord Arterial 7.30 7.18 - 7.34 pH Units    pCO2, Cord Arterial 47.2 33.0 - 49.0 mmHg    pO2, Cord Arterial 19.0 mmHg    HCO3, Cord Arterial 23.4 mmol/L    Base Exc, Cord Arterial -3.3 (L) -2.0 - 2.0 mmol/L    O2 Sat, Cord Arterial 24.7 %    Barometric Pressure for Blood Gas 750.6000 mmHg    Modality N/A    Blood Gas, Venous, Cord    Collection Time: 12/15/24 10:13 AM    Specimen: Umbilical Cord; Cord Blood Venous   Result Value Ref Range    pH, Cord Venous 7.367 7.260 - 7.400 pH Units    pCO2, Cord Venous 35.0 35.0 - 51.3 mm Hg    pO2, Cord Venous 27.6 19.0 - 39.0 mm Hg    HCO3, Cord Venous 20.1 mmol/L    Base Excess, Cord Venous -4.6 -30.0 - 30.0 mmol/L    O2 Sat, Cord Venous 50.3 %    Barometric Pressure for Blood Gas 751.0000 mmHg    Modality N/A    CBC Auto Differential    Collection Time: 12/16/24  5:47 AM    Specimen: Blood   Result Value Ref Range    WBC 13.66 (H) 3.40 - 10.80 10*3/mm3    RBC 3.39 (L) 3.77 - 5.28 10*6/mm3    Hemoglobin 11.0 (L) 12.0 - 15.9 g/dL    Hematocrit 34.1 34.0 - 46.6 %    .6 (H) 79.0 - 97.0 fL    MCH 32.4 26.6 - 33.0 pg    MCHC 32.3 31.5 - 35.7 g/dL    RDW 12.2 (L) 12.3 - 15.4 %    RDW-SD 45.1 37.0 - 54.0 fl    MPV 10.7 6.0 - 12.0 fL    Platelets 219 140 - 450  10*3/mm3    Neutrophil % 76.1 (H) 42.7 - 76.0 %    Lymphocyte % 13.8 (L) 19.6 - 45.3 %    Monocyte % 8.9 5.0 - 12.0 %    Eosinophil % 0.5 0.3 - 6.2 %    Basophil % 0.3 0.0 - 1.5 %    Immature Grans % 0.4 0.0 - 0.5 %    Neutrophils, Absolute 10.40 (H) 1.70 - 7.00 10*3/mm3    Lymphocytes, Absolute 1.89 0.70 - 3.10 10*3/mm3    Monocytes, Absolute 1.21 (H) 0.10 - 0.90 10*3/mm3    Eosinophils, Absolute 0.07 0.00 - 0.40 10*3/mm3    Basophils, Absolute 0.04 0.00 - 0.20 10*3/mm3    Immature Grans, Absolute 0.05 0.00 - 0.05 10*3/mm3    nRBC 0.0 0.0 - 0.2 /100 WBC   ]      Vitals:    12/16/24 1010   BP: 109/58   Pulse: 72   Resp: 18   Temp: 98.2 °F (36.8 °C)   SpO2:            ASSESSMENT/PLAN:    Patient is a 34 y.o.female who is POD# 1 s/p RLTCS, BS  - Progressing as expected  - Vital signs per protocol  - Rh pos /Rubella immune  -General Diet  -breastfeed  - Continue pain regimen for pain control  - AM labs reviewed  - Encourage ambulation  - SCDs while in bed  - Shower and remove dressing        Kristal Torres,

## 2024-12-16 NOTE — ANESTHESIA POSTPROCEDURE EVALUATION
Patient: Yarely Newman    Procedure Summary       Date: 12/15/24 Room / Location: [facility] LABOR DELIVERY  [facility] LABOR DELIVERY    Anesthesia Start: 926 Anesthesia Stop:     Procedure:  SECTION REPEAT WITH TUBAL (Abdomen) Diagnosis:     Surgeons: Kristal Torres DO Provider: William Weiss CRNA    Anesthesia Type: epidural ASA Status: 2 - Emergent            Anesthesia Type: epidural    Vitals  Vitals Value Taken Time   /58 24 1010   Temp 36.8 °C (98.2 °F) 24 1010   Pulse 72 24 1010   Resp 18 24 1010   SpO2 99 % 12/15/24 1338   Vitals shown include unfiled device data.        Post Anesthesia Care and Evaluation    Patient location during evaluation: bedside  Patient participation: complete - patient participated  Level of consciousness: awake  Pain score: 3  Pain management: adequate    Airway patency: patent  Anesthetic complications: No anesthetic complications  PONV Status: none  Cardiovascular status: acceptable  Respiratory status: acceptable  Hydration status: acceptable  Post Neuraxial Block status: Motor and sensory function returned to baseline and No signs or symptoms of PDPHNo anesthesia care post op

## 2024-12-16 NOTE — PLAN OF CARE
Goal Outcome Evaluation:              Outcome Evaluation: Patient up independently. VSS. Voiding appropriately.Pain controlled.

## 2024-12-16 NOTE — PLAN OF CARE
Goal Outcome Evaluation:              Outcome Evaluation: Pt up ad rafat, showered. Dressing removed. Voiding without difficulty. C/O gas pain. K-pad given. Offered Milk of mag. pt refused at this time.

## 2024-12-17 VITALS
SYSTOLIC BLOOD PRESSURE: 119 MMHG | HEART RATE: 79 BPM | TEMPERATURE: 98.4 F | DIASTOLIC BLOOD PRESSURE: 71 MMHG | OXYGEN SATURATION: 99 % | RESPIRATION RATE: 18 BRPM

## 2024-12-17 LAB
CYTO UR: NORMAL
LAB AP CASE REPORT: NORMAL
LAB AP CLINICAL INFORMATION: NORMAL
PATH REPORT.FINAL DX SPEC: NORMAL
PATH REPORT.GROSS SPEC: NORMAL

## 2024-12-17 PROCEDURE — 25010000002 ENOXAPARIN PER 10 MG: Performed by: STUDENT IN AN ORGANIZED HEALTH CARE EDUCATION/TRAINING PROGRAM

## 2024-12-17 RX ORDER — ENOXAPARIN SODIUM 100 MG/ML
40 INJECTION SUBCUTANEOUS
Qty: 5.6 ML | Refills: 0 | Status: SHIPPED | OUTPATIENT
Start: 2024-12-17 | End: 2024-12-31

## 2024-12-17 RX ADMIN — IBUPROFEN 800 MG: 800 TABLET, FILM COATED ORAL at 05:43

## 2024-12-17 RX ADMIN — ENOXAPARIN SODIUM 40 MG: 100 INJECTION SUBCUTANEOUS at 12:09

## 2024-12-17 RX ADMIN — ACETAMINOPHEN 650 MG: 325 TABLET ORAL at 04:12

## 2024-12-17 RX ADMIN — ACETAMINOPHEN 650 MG: 325 TABLET ORAL at 09:28

## 2024-12-17 RX ADMIN — SENNOSIDES AND DOCUSATE SODIUM 1 TABLET: 50; 8.6 TABLET ORAL at 09:28

## 2024-12-17 NOTE — PLAN OF CARE
Goal Outcome Evaluation:              Outcome Evaluation: to be discharged home with mom

## 2024-12-17 NOTE — PROGRESS NOTES
Postpartum  Section       POD# 2    Yarely Newman is a 34 y.o.  who underwent a  sectionRLTCS with BS She complains of appropriate pain per palpation that is controlled by pain meds. Patient is urinating appropriately.  Patient is eating without any N/V. Patient states lochia is light. Patient is ambulating without any difficulty. Patient is breastfeed      I/O last 3 completed shifts:  In: -   Out: 1250 [Urine:1250]  No intake/output data recorded.    Physical Exam:  HEENT:Normocephalic and atraumatic, NAD  Lungs: No labored breathing  Abdomen: Soft, appropriate tenderness to palpation, Uterine fundus firm and below the umbilicus  Wound:  Clean, Dry and Intact, negative drainage   Extremities: Negative swelling or cyanosis, negative clonus    Recent Results (from the past 48 hours)   Blood Gas, Arterial, Cord    Collection Time: 12/15/24 10:12 AM    Specimen: Umbilical Cord; Cord Blood Arterial   Result Value Ref Range    pH, Cord Arterial 7.30 7.18 - 7.34 pH Units    pCO2, Cord Arterial 47.2 33.0 - 49.0 mmHg    pO2, Cord Arterial 19.0 mmHg    HCO3, Cord Arterial 23.4 mmol/L    Base Exc, Cord Arterial -3.3 (L) -2.0 - 2.0 mmol/L    O2 Sat, Cord Arterial 24.7 %    Barometric Pressure for Blood Gas 750.6000 mmHg    Modality N/A    Blood Gas, Venous, Cord    Collection Time: 12/15/24 10:13 AM    Specimen: Umbilical Cord; Cord Blood Venous   Result Value Ref Range    pH, Cord Venous 7.367 7.260 - 7.400 pH Units    pCO2, Cord Venous 35.0 35.0 - 51.3 mm Hg    pO2, Cord Venous 27.6 19.0 - 39.0 mm Hg    HCO3, Cord Venous 20.1 mmol/L    Base Excess, Cord Venous -4.6 -30.0 - 30.0 mmol/L    O2 Sat, Cord Venous 50.3 %    Barometric Pressure for Blood Gas 751.0000 mmHg    Modality N/A    CBC Auto Differential    Collection Time: 24  5:47 AM    Specimen: Blood   Result Value Ref Range    WBC 13.66 (H) 3.40 - 10.80 10*3/mm3    RBC 3.39 (L) 3.77 - 5.28 10*6/mm3    Hemoglobin 11.0 (L) 12.0 - 15.9 g/dL     Hematocrit 34.1 34.0 - 46.6 %    .6 (H) 79.0 - 97.0 fL    MCH 32.4 26.6 - 33.0 pg    MCHC 32.3 31.5 - 35.7 g/dL    RDW 12.2 (L) 12.3 - 15.4 %    RDW-SD 45.1 37.0 - 54.0 fl    MPV 10.7 6.0 - 12.0 fL    Platelets 219 140 - 450 10*3/mm3    Neutrophil % 76.1 (H) 42.7 - 76.0 %    Lymphocyte % 13.8 (L) 19.6 - 45.3 %    Monocyte % 8.9 5.0 - 12.0 %    Eosinophil % 0.5 0.3 - 6.2 %    Basophil % 0.3 0.0 - 1.5 %    Immature Grans % 0.4 0.0 - 0.5 %    Neutrophils, Absolute 10.40 (H) 1.70 - 7.00 10*3/mm3    Lymphocytes, Absolute 1.89 0.70 - 3.10 10*3/mm3    Monocytes, Absolute 1.21 (H) 0.10 - 0.90 10*3/mm3    Eosinophils, Absolute 0.07 0.00 - 0.40 10*3/mm3    Basophils, Absolute 0.04 0.00 - 0.20 10*3/mm3    Immature Grans, Absolute 0.05 0.00 - 0.05 10*3/mm3    nRBC 0.0 0.0 - 0.2 /100 WBC   ]      Vitals:    12/17/24 0540   BP: 98/48   Pulse: 72   Resp: 16   Temp: 98.1 °F (36.7 °C)   SpO2:            ASSESSMENT/PLAN:    Patient is a 34 y.o.female who is POD# 2 s/p RLTCS, BS  - Progressing as expected  - Vital signs per protocol  - Rh pos /Rubella immune  -General Diet  -breastfeed  - Continue pain regimen for pain control  - Encourage ambulation  - SCDs while in bed    Hx of superficial thrombophlebitis in lower extremity  - Recommend Lovenox x 2 weeks for DVT prophylaxis    Stable for DC home, f/u in 2 weeks    Kristal Torres DO

## 2024-12-20 ENCOUNTER — HOSPITAL ENCOUNTER (OUTPATIENT)
Facility: HOSPITAL | Age: 34
Discharge: HOME | End: 2024-12-20
Attending: OBSTETRICS & GYNECOLOGY | Admitting: OBSTETRICS & GYNECOLOGY
Payer: COMMERCIAL

## 2024-12-20 VITALS
TEMPERATURE: 98.2 F | HEART RATE: 52 BPM | RESPIRATION RATE: 18 BRPM | SYSTOLIC BLOOD PRESSURE: 143 MMHG | OXYGEN SATURATION: 96 % | DIASTOLIC BLOOD PRESSURE: 75 MMHG

## 2024-12-20 PROBLEM — R03.0 ELEVATED BLOOD-PRESSURE READING WITHOUT DIAGNOSIS OF HYPERTENSION: Status: ACTIVE | Noted: 2024-12-20

## 2024-12-20 LAB
ALBUMIN SERPL-MCNC: 3.5 G/DL (ref 3.5–5.2)
ALBUMIN/GLOB SERPL: 1.1 G/DL
ALP SERPL-CCNC: 81 U/L (ref 39–117)
ALT SERPL W P-5'-P-CCNC: 20 U/L (ref 1–33)
ANION GAP SERPL CALCULATED.3IONS-SCNC: 12 MMOL/L (ref 5–15)
AST SERPL-CCNC: 23 U/L (ref 1–32)
BASOPHILS # BLD AUTO: 0.03 10*3/MM3 (ref 0–0.2)
BASOPHILS NFR BLD AUTO: 0.3 % (ref 0–1.5)
BILIRUB BLD-MCNC: NEGATIVE MG/DL
BILIRUB SERPL-MCNC: 0.2 MG/DL (ref 0–1.2)
BUN SERPL-MCNC: 14 MG/DL (ref 6–20)
BUN/CREAT SERPL: 33.3 (ref 7–25)
CALCIUM SPEC-SCNC: 9.2 MG/DL (ref 8.6–10.5)
CHLORIDE SERPL-SCNC: 104 MMOL/L (ref 98–107)
CLARITY, POC: CLEAR
CO2 SERPL-SCNC: 23 MMOL/L (ref 22–29)
COLOR UR: YELLOW
CREAT SERPL-MCNC: 0.42 MG/DL (ref 0.57–1)
CREAT UR-MCNC: 17.1 MG/DL
DEPRECATED RDW RBC AUTO: 43.6 FL (ref 37–54)
EGFRCR SERPLBLD CKD-EPI 2021: 130.2 ML/MIN/1.73
EOSINOPHIL # BLD AUTO: 0.06 10*3/MM3 (ref 0–0.4)
EOSINOPHIL NFR BLD AUTO: 0.7 % (ref 0.3–6.2)
ERYTHROCYTE [DISTWIDTH] IN BLOOD BY AUTOMATED COUNT: 12 % (ref 12.3–15.4)
GLOBULIN UR ELPH-MCNC: 3.1 GM/DL
GLUCOSE SERPL-MCNC: 94 MG/DL (ref 65–99)
GLUCOSE UR STRIP-MCNC: NEGATIVE MG/DL
HCT VFR BLD AUTO: 33.8 % (ref 34–46.6)
HGB BLD-MCNC: 11.2 G/DL (ref 12–15.9)
IMM GRANULOCYTES # BLD AUTO: 0.03 10*3/MM3 (ref 0–0.05)
IMM GRANULOCYTES NFR BLD AUTO: 0.3 % (ref 0–0.5)
KETONES UR QL: NEGATIVE
LEUKOCYTE EST, POC: NEGATIVE
LYMPHOCYTES # BLD AUTO: 2.23 10*3/MM3 (ref 0.7–3.1)
LYMPHOCYTES NFR BLD AUTO: 24.7 % (ref 19.6–45.3)
MCH RBC QN AUTO: 32.7 PG (ref 26.6–33)
MCHC RBC AUTO-ENTMCNC: 33.1 G/DL (ref 31.5–35.7)
MCV RBC AUTO: 98.5 FL (ref 79–97)
MONOCYTES # BLD AUTO: 0.7 10*3/MM3 (ref 0.1–0.9)
MONOCYTES NFR BLD AUTO: 7.8 % (ref 5–12)
NEUTROPHILS NFR BLD AUTO: 5.98 10*3/MM3 (ref 1.7–7)
NEUTROPHILS NFR BLD AUTO: 66.2 % (ref 42.7–76)
NITRITE UR-MCNC: NEGATIVE MG/ML
NRBC BLD AUTO-RTO: 0 /100 WBC (ref 0–0.2)
PH UR: 7 [PH] (ref 5–8)
PLATELET # BLD AUTO: 270 10*3/MM3 (ref 140–450)
PMV BLD AUTO: 10.2 FL (ref 6–12)
POTASSIUM SERPL-SCNC: 3.9 MMOL/L (ref 3.5–5.2)
PROT ?TM UR-MCNC: <4 MG/DL
PROT SERPL-MCNC: 6.6 G/DL (ref 6–8.5)
PROT UR STRIP-MCNC: NEGATIVE MG/DL
PROT/CREAT UR: NORMAL MG/G{CREAT}
RBC # BLD AUTO: 3.43 10*6/MM3 (ref 3.77–5.28)
RBC # UR STRIP: ABNORMAL /UL
SODIUM SERPL-SCNC: 139 MMOL/L (ref 136–145)
SP GR UR: 1.01 (ref 1–1.03)
UROBILINOGEN UR QL: ABNORMAL
WBC NRBC COR # BLD AUTO: 9.03 10*3/MM3 (ref 3.4–10.8)

## 2024-12-20 PROCEDURE — 85025 COMPLETE CBC W/AUTO DIFF WBC: CPT | Performed by: OBSTETRICS & GYNECOLOGY

## 2024-12-20 PROCEDURE — 82570 ASSAY OF URINE CREATININE: CPT | Performed by: OBSTETRICS & GYNECOLOGY

## 2024-12-20 PROCEDURE — 81002 URINALYSIS NONAUTO W/O SCOPE: CPT | Performed by: OBSTETRICS & GYNECOLOGY

## 2024-12-20 PROCEDURE — 80053 COMPREHEN METABOLIC PANEL: CPT | Performed by: OBSTETRICS & GYNECOLOGY

## 2024-12-20 PROCEDURE — G0463 HOSPITAL OUTPT CLINIC VISIT: HCPCS

## 2024-12-20 PROCEDURE — 84156 ASSAY OF PROTEIN URINE: CPT | Performed by: OBSTETRICS & GYNECOLOGY

## 2024-12-21 NOTE — NURSING NOTE
, 5 days Postpartum from C/S. Presented with complaints of High B/P at home. Denies any HA, blurred vision, dizziness, Mild, non-pitting, lower and upper extremity edema noted to hands and feet, bilateral. States pain is well controlled with Tylenol and Motrin, not requiring narcotics.   Dr Mccord notified of the above, orders received for labs.

## 2024-12-21 NOTE — H&P
LESLYE Jackson  Obstetric History and Physical    Chief Complaint   Patient presents with    Elevated Blood Pressure       Subjective     HPI:    Patient is a 34 y.o. female  currently at 39w5d, who presents with with the complaints of elevated blood pressures at home (164/94),  At that time she complained of a mild headache that resolved.  She had taken motrin for post op pain.  She describes intermittent vision changes described as streak of silver light seen in the left eye.  She denies nausea, vomiting, epigastric pain or abdominal pain.  She started monitoring her blood pressure and pulse at home.  Her pulse was low in the mid 40's to 50's.  She is post op day #5 following repeat  section.  She is worried she may be developing preEclampsia.      PNC provided by:  [facility] [place]. Her prenatal care is complicated by   Patient Active Problem List   Diagnosis    Palpitations    Tachycardia    Obesity (BMI 30-39.9)    Supervision of other normal pregnancy, antepartum    H/O  section    Hx of gestational diabetes in prior pregnancy, currently pregnant    AMA (advanced maternal age) multigravida 35+    Antepartum placenta circumvallata    Diet controlled gestational diabetes mellitus (GDM), antepartum    Varicose veins of left lower extremity with other complications    Normal pregnancy    Elevated blood-pressure reading without diagnosis of hypertension         Her previous obstetric/gynecological history is noted for   with LTCS x2 and SAB x1 .    The following portions of the patients history were reviewed and updated as appropriate:   current medications, allergies, past medical history, past surgical history, past family history, past social history and current problem list.     Prenatal Information:  Prenatal Results       Initial Prenatal Labs       Test Value Reference Range Date Time    Hemoglobin  12.1 g/dL 12.0 - 15.9 24 1527       12.8 g/dL 12.0 - 15.9 24 0932    Hematocrit  35.9  % 34.0 - 46.6 06/27/24 1527       37.7 % 34.0 - 46.6 04/25/24 0932    Platelets  279 10*3/mm3 140 - 450 06/27/24 1527       330 10*3/mm3 140 - 450 04/25/24 0932    Rubella IgG  1.05 index Immune >0.99 06/27/24 1527    Hepatitis B SAg  Non-Reactive  Non-Reactive 06/27/24 1527    Hepatitis C Ab  Non-Reactive  Non-Reactive 06/27/24 1527    RPR  Non Reactive  Non Reactive 08/27/24 1603    T. Pallidum Ab   Non-Reactive  Non-Reactive 12/15/24 0803       Non-Reactive  Non-Reactive 06/27/24 1527    ABO  A   12/15/24 0803    Rh  Positive   12/15/24 0803    Antibody Screen  Negative   06/27/24 1527    HIV  Non-Reactive  Non-Reactive 06/27/24 1527    Urine Culture  No growth   06/27/24 1537    Gonorrhea  Negative  Negative 06/27/24 1537    Chlamydia  Negative  Negative 06/27/24 1537    TSH        HgB A1c   5.50 % 4.80 - 5.60 06/27/24 1527       5.60 % 4.80 - 5.60 04/25/24 0932    Varicella IgG        Hemoglobinopathy Fractionation  Comment   06/27/24 1527    Hemoglobinopathy (genetic testing)        Cystic fibrosis         Spinal muscular atrophy        Fragile X                  Fetal testing        Test Value Reference Range Date Time    NIPT        MSAFP        AFP-4                  2nd and 3rd Trimester       Test Value Reference Range Date Time    Hemoglobin (repeated)  11.2 g/dL 12.0 - 15.9 12/20/24 2211       11.0 g/dL 12.0 - 15.9 12/16/24 0547       12.6 g/dL 12.0 - 15.9 12/15/24 0803       12.5 g/dL 12.0 - 15.9 12/05/24 1603       10.9 g/dL 12.0 - 15.9 08/27/24 1603    Hematocrit (repeated)  33.8 % 34.0 - 46.6 12/20/24 2211       34.1 % 34.0 - 46.6 12/16/24 0547       37.4 % 34.0 - 46.6 12/15/24 0803       36.0 % 34.0 - 46.6 12/05/24 1603       32.1 % 34.0 - 46.6 08/27/24 1603    Platelets   270 10*3/mm3 140 - 450 12/20/24 2211       219 10*3/mm3 140 - 450 12/16/24 0547       246 10*3/mm3 140 - 450 12/15/24 0803       260 10*3/mm3 140 - 450 12/05/24 1603       251 10*3/mm3 140 - 450 08/27/24 1603       279 10*3/mm3  140 - 450 06/27/24 1527       330 10*3/mm3 140 - 450 04/25/24 0932    1 hour GTT   190 mg/dL 65 - 139 08/27/24 1603    Antibody Screen (repeated)  Negative   12/15/24 0803    3rd TM syphilis scrn (repeated)  RPR         3rd TM syphilis scrn (repeated) TP-Ab  Non-Reactive  Non-Reactive 12/15/24 0803    3rd TM syphilis screen TB-Ab (FTA)  Non-Reactive  Non-Reactive 12/15/24 0803    Syphilis cascade test TP-Ab (EIA)        Syphilis cascade TPPA        GTT Fasting        GTT 1 Hr        GTT 2 Hr        GTT 3 Hr        Group B Strep  Negative  Negative 11/20/24 1636              Other testing        Test Value Reference Range Date Time    Parvo IgG         CMV IgG                   Drug Screening       Test Value Reference Range Date Time    Amphetamine Screen  Negative  Negative 12/15/24 0803    Barbiturate Screen  Negative  Negative 12/15/24 0803       Negative  Negative 06/27/24 1537    Benzodiazepine Screen  Negative  Negative 12/15/24 0803       Negative  Negative 06/27/24 1537    Methadone Screen  Negative  Negative 12/15/24 0803       Negative  Negative 06/27/24 1537    Phencyclidine Screen  Negative  Negative 12/15/24 0803    Opiates Screen  Negative  Negative 12/15/24 0803       Negative  Negative 06/27/24 1537    THC Screen  Negative  Negative 12/15/24 0803       Negative  Negative 06/27/24 1537    Cocaine Screen  Negative  Negative 12/15/24 0803       Negative  Negative 06/27/24 1537    Propoxyphene Screen        Buprenorphine Screen  Negative  Negative 12/15/24 0803    Methamphetamine Screen  Negative  Negative 12/15/24 0803    Oxycodone Screen  Negative  Negative 12/15/24 0803       Negative  Negative 06/27/24 1537    Tricyclic Antidepressants Screen  Negative  Negative 12/15/24 0803              Legend    ^: Historical                          External Prenatal Results       Pregnancy Outside Results - Transcribed From Office Records - See Scanned Records For Details       Test Value Date Time    ABO  A   12/15/24 0803    Rh  Positive  12/15/24 0803    Antibody Screen  Negative  12/15/24 0803       Negative  06/27/24 1527    Varicella IgG       Rubella  1.05 index 06/27/24 1527    Hgb  11.2 g/dL 12/20/24 2211       11.0 g/dL 12/16/24 0547       12.6 g/dL 12/15/24 0803       12.5 g/dL 12/05/24 1603       10.9 g/dL 08/27/24 1603       12.1 g/dL 06/27/24 1527       12.8 g/dL 04/25/24 0932    Hct  33.8 % 12/20/24 2211       34.1 % 12/16/24 0547       37.4 % 12/15/24 0803       36.0 % 12/05/24 1603       32.1 % 08/27/24 1603       35.9 % 06/27/24 1527       37.7 % 04/25/24 0932    HgB A1c   5.50 % 06/27/24 1527       5.60 % 04/25/24 0932    1h GTT  190 mg/dL 08/27/24 1603    3h GTT Fasting       3h GTT 1 hour       3h GTT 2 hour       3h GTT 3 hour        Gonorrhea (discrete)  Negative  06/27/24 1537    Chlamydia (discrete)  Negative  06/27/24 1537    RPR  Non Reactive  08/27/24 1603    Syphils cascade: TP-Ab (FTA)  Non-Reactive  12/15/24 0803       Non-Reactive  06/27/24 1527    TP-Ab  Non-Reactive  12/15/24 0803       Non-Reactive  06/27/24 1527    TP-Ab (EIA)       TPPA       HBsAg  Non-Reactive  06/27/24 1527    Herpes Simplex Virus PCR       Herpes Simplex VIrus Culture       HIV  Non-Reactive  06/27/24 1527    Hep C RNA Quant PCR       Hep C Antibody  Non-Reactive  06/27/24 1527    AFP       NIPT       Cystic Fibrosis ([place])       Cystic Fibroisis        Spinal Muscular atrophy       Fragile X       Group B Strep  Negative  11/20/24 1636    GBS Susceptibility to Clindamycin       GBS Susceptibility to Erythromycin       Fetal Fibronectin       Genetic Testing, Maternal Blood                 Drug Screening       Test Value Date Time    Urine Drug Screen       Amphetamine Screen  Negative  12/15/24 0803    Barbiturate Screen  Negative  12/15/24 0803       Negative  06/27/24 1537    Benzodiazepine Screen  Negative  12/15/24 0803       Negative  06/27/24 1537    Methadone Screen  Negative  12/15/24 0803       Negative   24 1537    Phencyclidine Screen  Negative  12/15/24 0803    Opiates Screen  Negative  12/15/24 0803       Negative  24 1537    THC Screen  Negative  12/15/24 0803       Negative  24 1537    Cocaine Screen       Propoxyphene Screen       Buprenorphine Screen  Negative  12/15/24 0803    Methamphetamine Screen       Oxycodone Screen  Negative  12/15/24 0803       Negative  24 1537    Tricyclic Antidepressants Screen  Negative  12/15/24 0803              Legend    ^: Historical                             Past OB History:     OB History    Para Term  AB Living   3 2 2 0 1 2   SAB IAB Ectopic Molar Multiple Live Births   0 0 0 0 0 2      # Outcome Date GA Lbr Phu/2nd Weight Sex Type Anes PTL Lv   3 Term 12/15/24 39w5d  3860 g (8 lb 8.2 oz) F CS-LTranv Spinal N SHAYY      Name: Omar Juan      Apgar1: 9  Apgar5: 9   2 Term     M CS-Unspec   SHAYY   1 AB                Past Medical History: Past Medical History:   Diagnosis Date    Abnormal ECG     Anxiety     Arrhythmia     Heart palpitations     Hyperlipidemia     Migraine     Polycystic ovary syndrome       Past Surgical History Past Surgical History:   Procedure Laterality Date    ANKLE SURGERY Left      SECTION       SECTION WITH TUBAL N/A 12/15/2024    Procedure:  SECTION REPEAT WITH TUBAL;  Surgeon: Kristal Torres DO;  Location: [facility] LABOR DELIVERY;  Service: Gynecology;  Laterality: N/A;      Family History: Family History   Problem Relation Age of Onset    Mental illness Father     Drug abuse Father     Alcohol abuse Father     Depression Father     Anxiety disorder Father     Hyperlipidemia Father     Hypertension Father     Diabetes Father     Liver disease Mother     Heart disease Mother     Depression Mother     Anxiety disorder Mother     Hyperlipidemia Mother     Hypertension Mother     Diabetes Mother     Alcohol abuse Mother     Drug abuse Mother     Breast cancer Sister     Mental  illness Sister     Thyroid disease Maternal Aunt     Hyperlipidemia Maternal Aunt     Hypertension Maternal Aunt     Hyperlipidemia Maternal Aunt     Hypertension Maternal Aunt     Ovarian cancer Neg Hx     Uterine cancer Neg Hx     Colon cancer Neg Hx     Deep vein thrombosis Neg Hx     Pulmonary embolism Neg Hx       Social History:  reports that she quit smoking about 3 years ago. Her smoking use included cigarettes. She started smoking about 18 years ago. She has a 15 pack-year smoking history. She has never used smokeless tobacco.   reports that she does not currently use alcohol.   reports no history of drug use.        General ROS: Pertinent items are noted in HPI    Home Medications:  Enoxaparin Sodium, cholecalciferol, docusate sodium, ibuprofen, prenatal vitamin, and vitamin B-12    Allergies:  Allergies   Allergen Reactions    Erythromycin Hives       Objective       Vital Signs Range for the last 24 hours  Temperature: Temp:  [98.2 °F (36.8 °C)] 98.2 °F (36.8 °C)   Temp Source: Temp src: Oral   BP: BP: (143-154)/(72-80) 143/75   Pulse: Heart Rate:  [50-56] 52   Respirations: Resp:  [18] 18   SPO2: SpO2:  [96 %-100 %] 96 %      Vitals:    12/20/24 2215 12/20/24 2230 12/20/24 2245 12/20/24 2300   BP: 143/74 148/76 149/72 143/75   BP Location:       Patient Position:       Pulse: 50 56 56 52   Resp:       Temp:       TempSrc:       SpO2: 99% 100% 99% 96%         Physical Examination:   General appearance - alert, well appearing, and in no distress  Mental status - alert, oriented to person, place, and time  Chest - normal respiratory effort.  No resp distress  Heart - normal rate, regular rhythm.  Heart rate mid to low 50's  Abdomen - soft, nontender, nondistended,   Pelvic - normal external genitalia, vulva, vagina, cervix, and uterus   Back exam - full range of motion, no tenderness or pain on motion  Neurological - alert, oriented, normal speech  Extremities - peripheral pulses normal  Skin - normal  coloration and turgor, no suspicious skin lesions noted                     Results for orders placed or performed during the hospital encounter of 12/20/24   POC Urinalysis Dipstick    Collection Time: 12/20/24  9:58 PM    Specimen: Urine   Result Value Ref Range    Color Yellow Yellow, Straw, Dark Yellow, Yarely    Clarity, UA Clear Clear    Glucose, UA Negative Negative mg/dL    Bilirubin Negative Negative    Ketones, UA Negative Negative    Specific Gravity  1.010 1.005 - 1.030    Blood, UA Trace (A) Negative    pH, Urine 7.0 5.0 - 8.0    Protein, POC Negative Negative mg/dL    Urobilinogen, UA 0.2 E.U./dL Normal, 0.2 E.U./dL    Leukocytes Negative Negative    Nitrite, UA Negative Negative   Comprehensive Metabolic Panel    Collection Time: 12/20/24 10:11 PM    Specimen: Blood   Result Value Ref Range    Glucose 94 65 - 99 mg/dL    BUN 14 6 - 20 mg/dL    Creatinine 0.42 (L) 0.57 - 1.00 mg/dL    Sodium 139 136 - 145 mmol/L    Potassium 3.9 3.5 - 5.2 mmol/L    Chloride 104 98 - 107 mmol/L    CO2 23.0 22.0 - 29.0 mmol/L    Calcium 9.2 8.6 - 10.5 mg/dL    Total Protein 6.6 6.0 - 8.5 g/dL    Albumin 3.5 3.5 - 5.2 g/dL    ALT (SGPT) 20 1 - 33 U/L    AST (SGOT) 23 1 - 32 U/L    Alkaline Phosphatase 81 39 - 117 U/L    Total Bilirubin 0.2 0.0 - 1.2 mg/dL    Globulin 3.1 gm/dL    A/G Ratio 1.1 g/dL    BUN/Creatinine Ratio 33.3 (H) 7.0 - 25.0    Anion Gap 12.0 5.0 - 15.0 mmol/L    eGFR 130.2 >60.0 mL/min/1.73   Protein / Creatinine Ratio, Urine - Urine, Clean Catch    Collection Time: 12/20/24 10:11 PM    Specimen: Urine, Clean Catch   Result Value Ref Range    Creatinine, Urine 17.1 mg/dL    Total Protein, Urine <4.0 mg/dL    Protein/Creatinine Ratio, Urine     CBC Auto Differential    Collection Time: 12/20/24 10:11 PM    Specimen: Blood   Result Value Ref Range    WBC 9.03 3.40 - 10.80 10*3/mm3    RBC 3.43 (L) 3.77 - 5.28 10*6/mm3    Hemoglobin 11.2 (L) 12.0 - 15.9 g/dL    Hematocrit 33.8 (L) 34.0 - 46.6 %    MCV 98.5  (H) 79.0 - 97.0 fL    MCH 32.7 26.6 - 33.0 pg    MCHC 33.1 31.5 - 35.7 g/dL    RDW 12.0 (L) 12.3 - 15.4 %    RDW-SD 43.6 37.0 - 54.0 fl    MPV 10.2 6.0 - 12.0 fL    Platelets 270 140 - 450 10*3/mm3    Neutrophil % 66.2 42.7 - 76.0 %    Lymphocyte % 24.7 19.6 - 45.3 %    Monocyte % 7.8 5.0 - 12.0 %    Eosinophil % 0.7 0.3 - 6.2 %    Basophil % 0.3 0.0 - 1.5 %    Immature Grans % 0.3 0.0 - 0.5 %    Neutrophils, Absolute 5.98 1.70 - 7.00 10*3/mm3    Lymphocytes, Absolute 2.23 0.70 - 3.10 10*3/mm3    Monocytes, Absolute 0.70 0.10 - 0.90 10*3/mm3    Eosinophils, Absolute 0.06 0.00 - 0.40 10*3/mm3    Basophils, Absolute 0.03 0.00 - 0.20 10*3/mm3    Immature Grans, Absolute 0.03 0.00 - 0.05 10*3/mm3    nRBC 0.0 0.0 - 0.2 /100 WBC        Assessment & Plan       Elevated blood-pressure reading without diagnosis of hypertension (5 days post partum)     POD#5 following repeat  Section      Assessment:  1.  Labile Hypertension (Patient gave history of having white coat hypertension)    2.  No evidence of PreEclammpsia  3.  Obstetrical history significant for  with previous CS x2 and SAB x1.  4.  GBS status:   Group B Strep, DNA   Date Value Ref Range Status   2024 Negative Negative Final       Plan:  1.  CBC, CMP and urine for UA and PCR  2.  Labs reviewed no evidence of preEclampsia  3.  Will contact [facility] office and have Dr Torres see her within the next 3-7 days for follow up labs and repeat blood pressure.    4.  Plan of care has been reviewed with patient and patient agrees.   5.  Risks, benefits of treatment plan have been discussed.  6.  All questions have been answered.      Evy Mccord MD      Electronically signed by Evy Mccord MD, 24, 10:20 PM EST.

## 2024-12-21 NOTE — NURSING NOTE
Dr Mccord at bedside, POC discussed, Plan to F/U with OB office next week for B/P check. Discharge orders received.

## 2024-12-22 ENCOUNTER — HOSPITAL ENCOUNTER (INPATIENT)
Facility: HOSPITAL | Age: 34
LOS: 2 days | Discharge: HOME | End: 2024-12-24
Attending: OBSTETRICS & GYNECOLOGY | Admitting: OBSTETRICS & GYNECOLOGY
Payer: COMMERCIAL

## 2024-12-22 LAB
ALBUMIN SERPL-MCNC: 3.6 G/DL (ref 3.5–5.2)
ALBUMIN/GLOB SERPL: 1.2 G/DL
ALP SERPL-CCNC: 78 U/L (ref 39–117)
ALT SERPL W P-5'-P-CCNC: 29 U/L (ref 1–33)
ANION GAP SERPL CALCULATED.3IONS-SCNC: 11.3 MMOL/L (ref 5–15)
AST SERPL-CCNC: 23 U/L (ref 1–32)
BASOPHILS # BLD AUTO: 0.03 10*3/MM3 (ref 0–0.2)
BASOPHILS NFR BLD AUTO: 0.3 % (ref 0–1.5)
BILIRUB SERPL-MCNC: 0.2 MG/DL (ref 0–1.2)
BUN SERPL-MCNC: 10 MG/DL (ref 6–20)
BUN/CREAT SERPL: 21.7 (ref 7–25)
CALCIUM SPEC-SCNC: 8.9 MG/DL (ref 8.6–10.5)
CHLORIDE SERPL-SCNC: 105 MMOL/L (ref 98–107)
CO2 SERPL-SCNC: 21.7 MMOL/L (ref 22–29)
CREAT SERPL-MCNC: 0.46 MG/DL (ref 0.57–1)
CREAT UR-MCNC: 51.9 MG/DL
DEPRECATED RDW RBC AUTO: 42 FL (ref 37–54)
EGFRCR SERPLBLD CKD-EPI 2021: 127.4 ML/MIN/1.73
EOSINOPHIL # BLD AUTO: 0.06 10*3/MM3 (ref 0–0.4)
EOSINOPHIL NFR BLD AUTO: 0.7 % (ref 0.3–6.2)
ERYTHROCYTE [DISTWIDTH] IN BLOOD BY AUTOMATED COUNT: 11.9 % (ref 12.3–15.4)
GLOBULIN UR ELPH-MCNC: 3.1 GM/DL
GLUCOSE SERPL-MCNC: 96 MG/DL (ref 65–99)
HCT VFR BLD AUTO: 33.8 % (ref 34–46.6)
HGB BLD-MCNC: 11.4 G/DL (ref 12–15.9)
IMM GRANULOCYTES # BLD AUTO: 0.04 10*3/MM3 (ref 0–0.05)
IMM GRANULOCYTES NFR BLD AUTO: 0.4 % (ref 0–0.5)
LYMPHOCYTES # BLD AUTO: 2.26 10*3/MM3 (ref 0.7–3.1)
LYMPHOCYTES NFR BLD AUTO: 25.2 % (ref 19.6–45.3)
MCH RBC QN AUTO: 32.7 PG (ref 26.6–33)
MCHC RBC AUTO-ENTMCNC: 33.7 G/DL (ref 31.5–35.7)
MCV RBC AUTO: 96.8 FL (ref 79–97)
MONOCYTES # BLD AUTO: 0.78 10*3/MM3 (ref 0.1–0.9)
MONOCYTES NFR BLD AUTO: 8.7 % (ref 5–12)
NEUTROPHILS NFR BLD AUTO: 5.81 10*3/MM3 (ref 1.7–7)
NEUTROPHILS NFR BLD AUTO: 64.7 % (ref 42.7–76)
NRBC BLD AUTO-RTO: 0 /100 WBC (ref 0–0.2)
PLATELET # BLD AUTO: 284 10*3/MM3 (ref 140–450)
PMV BLD AUTO: 10.1 FL (ref 6–12)
POTASSIUM SERPL-SCNC: 3.5 MMOL/L (ref 3.5–5.2)
PROT ?TM UR-MCNC: 11 MG/DL
PROT SERPL-MCNC: 6.7 G/DL (ref 6–8.5)
PROT/CREAT UR: 0.21 MG/G{CREAT}
RBC # BLD AUTO: 3.49 10*6/MM3 (ref 3.77–5.28)
SODIUM SERPL-SCNC: 138 MMOL/L (ref 136–145)
WBC NRBC COR # BLD AUTO: 8.98 10*3/MM3 (ref 3.4–10.8)

## 2024-12-22 PROCEDURE — 93005 ELECTROCARDIOGRAM TRACING: CPT | Performed by: OBSTETRICS & GYNECOLOGY

## 2024-12-22 PROCEDURE — 82570 ASSAY OF URINE CREATININE: CPT | Performed by: OBSTETRICS & GYNECOLOGY

## 2024-12-22 PROCEDURE — 85025 COMPLETE CBC W/AUTO DIFF WBC: CPT | Performed by: OBSTETRICS & GYNECOLOGY

## 2024-12-22 PROCEDURE — 25810000003 LACTATED RINGERS PER 1000 ML: Performed by: OBSTETRICS & GYNECOLOGY

## 2024-12-22 PROCEDURE — 25010000002 ENOXAPARIN PER 10 MG: Performed by: OBSTETRICS & GYNECOLOGY

## 2024-12-22 PROCEDURE — 84156 ASSAY OF PROTEIN URINE: CPT | Performed by: OBSTETRICS & GYNECOLOGY

## 2024-12-22 PROCEDURE — 80053 COMPREHEN METABOLIC PANEL: CPT | Performed by: OBSTETRICS & GYNECOLOGY

## 2024-12-22 PROCEDURE — 25010000002 MAGNESIUM SULFATE 40 GM/1000ML SOLUTION: Performed by: OBSTETRICS & GYNECOLOGY

## 2024-12-22 RX ORDER — IBUPROFEN 800 MG/1
800 TABLET, FILM COATED ORAL EVERY 8 HOURS PRN
Status: DISPENSED | OUTPATIENT
Start: 2024-12-22 | End: 2024-12-24

## 2024-12-22 RX ORDER — SODIUM CHLORIDE, SODIUM LACTATE, POTASSIUM CHLORIDE, CALCIUM CHLORIDE 600; 310; 30; 20 MG/100ML; MG/100ML; MG/100ML; MG/100ML
75 INJECTION, SOLUTION INTRAVENOUS CONTINUOUS
Status: DISCONTINUED | OUTPATIENT
Start: 2024-12-22 | End: 2024-12-22

## 2024-12-22 RX ORDER — CALCIUM GLUCONATE 94 MG/ML
1 INJECTION, SOLUTION INTRAVENOUS ONCE AS NEEDED
Status: DISCONTINUED | OUTPATIENT
Start: 2024-12-22 | End: 2024-12-22

## 2024-12-22 RX ORDER — BUTALBITAL, ACETAMINOPHEN AND CAFFEINE 300; 40; 50 MG/1; MG/1; MG/1
1 CAPSULE ORAL ONCE
Status: COMPLETED | OUTPATIENT
Start: 2024-12-22 | End: 2024-12-22

## 2024-12-22 RX ORDER — ENOXAPARIN SODIUM 100 MG/ML
40 INJECTION SUBCUTANEOUS DAILY
Status: DISCONTINUED | OUTPATIENT
Start: 2024-12-22 | End: 2024-12-24 | Stop reason: HOSPADM

## 2024-12-22 RX ORDER — MAGNESIUM SULFATE HEPTAHYDRATE 40 MG/ML
2 INJECTION, SOLUTION INTRAVENOUS CONTINUOUS
Status: DISCONTINUED | OUTPATIENT
Start: 2024-12-22 | End: 2024-12-22

## 2024-12-22 RX ADMIN — ENOXAPARIN SODIUM 40 MG: 100 INJECTION SUBCUTANEOUS at 14:34

## 2024-12-22 RX ADMIN — BUTALBITAL, ACETAMINOPHEN AND CAFFEINE 1 CAPSULE: 300; 40; 50 CAPSULE ORAL at 00:49

## 2024-12-22 RX ADMIN — IBUPROFEN 800 MG: 800 TABLET, FILM COATED ORAL at 12:05

## 2024-12-22 RX ADMIN — SODIUM CHLORIDE, POTASSIUM CHLORIDE, SODIUM LACTATE AND CALCIUM CHLORIDE 75 ML/HR: 600; 310; 30; 20 INJECTION, SOLUTION INTRAVENOUS at 14:34

## 2024-12-22 RX ADMIN — IBUPROFEN 800 MG: 800 TABLET, FILM COATED ORAL at 03:11

## 2024-12-22 RX ADMIN — IBUPROFEN 800 MG: 800 TABLET, FILM COATED ORAL at 23:42

## 2024-12-22 RX ADMIN — MAGNESIUM SULFATE HEPTAHYDRATE 2 G/HR: 40 INJECTION, SOLUTION INTRAVENOUS at 02:45

## 2024-12-22 RX ADMIN — SODIUM CHLORIDE, POTASSIUM CHLORIDE, SODIUM LACTATE AND CALCIUM CHLORIDE 75 ML/HR: 600; 310; 30; 20 INJECTION, SOLUTION INTRAVENOUS at 02:31

## 2024-12-22 RX ADMIN — MAGNESIUM SULFATE HEPTAHYDRATE 2 G/HR: 40 INJECTION, SOLUTION INTRAVENOUS at 19:18

## 2024-12-22 NOTE — H&P
LESLYE Jackson  Obstetric History and Physical    Chief Complaint   Patient presents with    Elevated Blood Pressure       Subjective     PNC provided by:  CARLOS    HPI:    Patient is a 34 y.o. female P3 POD #7 s/p RCS with BTL, who presents with elevated BP at home and persistent HA unrelieved by tylenol;  HA has been going on since yesterday AM;  pt seen Friday pm for elevated BP and sent home;  otherwise doing well;  no increased pain or vb.  Pt with min increased bp right after delivery, not sent home on meds    Her previous obstetric/gynecological history is noted for  h/o A1 GDM .    The following portions of the patients history were reviewed and updated as appropriate:   current medications, allergies, past medical history, past surgical history, past family history, past social history and current problem list.     Prenatal Information:  Prenatal Results       Initial Prenatal Labs       Test Value Reference Range Date Time    Hemoglobin  12.1 g/dL 12.0 - 15.9 06/27/24 1527       12.8 g/dL 12.0 - 15.9 04/25/24 0932    Hematocrit  35.9 % 34.0 - 46.6 06/27/24 1527       37.7 % 34.0 - 46.6 04/25/24 0932    Platelets  279 10*3/mm3 140 - 450 06/27/24 1527       330 10*3/mm3 140 - 450 04/25/24 0932    Rubella IgG  1.05 index Immune >0.99 06/27/24 1527    Hepatitis B SAg  Non-Reactive  Non-Reactive 06/27/24 1527    Hepatitis C Ab  Non-Reactive  Non-Reactive 06/27/24 1527    RPR  Non Reactive  Non Reactive 08/27/24 1603    T. Pallidum Ab   Non-Reactive  Non-Reactive 12/15/24 0803       Non-Reactive  Non-Reactive 06/27/24 1527    ABO  A   12/15/24 0803    Rh  Positive   12/15/24 0803    Antibody Screen  Negative   06/27/24 1527    HIV  Non-Reactive  Non-Reactive 06/27/24 1527    Urine Culture  No growth   06/27/24 1537    Gonorrhea  Negative  Negative 06/27/24 1537    Chlamydia  Negative  Negative 06/27/24 1537    TSH        HgB A1c   5.50 % 4.80 - 5.60 06/27/24 1527       5.60 % 4.80 - 5.60 04/25/24 0932    Varicella IgG         Hemoglobinopathy Fractionation  Comment   06/27/24 1527    Hemoglobinopathy (genetic testing)        Cystic fibrosis         Spinal muscular atrophy        Fragile X                  Fetal testing        Test Value Reference Range Date Time    NIPT        MSAFP        AFP-4                  2nd and 3rd Trimester       Test Value Reference Range Date Time    Hemoglobin (repeated)  11.4 g/dL 12.0 - 15.9 12/22/24 0055       11.2 g/dL 12.0 - 15.9 12/20/24 2211       11.0 g/dL 12.0 - 15.9 12/16/24 0547       12.6 g/dL 12.0 - 15.9 12/15/24 0803       12.5 g/dL 12.0 - 15.9 12/05/24 1603       10.9 g/dL 12.0 - 15.9 08/27/24 1603    Hematocrit (repeated)  33.8 % 34.0 - 46.6 12/22/24 0055       33.8 % 34.0 - 46.6 12/20/24 2211       34.1 % 34.0 - 46.6 12/16/24 0547       37.4 % 34.0 - 46.6 12/15/24 0803       36.0 % 34.0 - 46.6 12/05/24 1603       32.1 % 34.0 - 46.6 08/27/24 1603    Platelets   284 10*3/mm3 140 - 450 12/22/24 0055       270 10*3/mm3 140 - 450 12/20/24 2211       219 10*3/mm3 140 - 450 12/16/24 0547       246 10*3/mm3 140 - 450 12/15/24 0803       260 10*3/mm3 140 - 450 12/05/24 1603       251 10*3/mm3 140 - 450 08/27/24 1603       279 10*3/mm3 140 - 450 06/27/24 1527       330 10*3/mm3 140 - 450 04/25/24 0932    1 hour GTT   190 mg/dL 65 - 139 08/27/24 1603    Antibody Screen (repeated)  Negative   12/15/24 0803    3rd TM syphilis scrn (repeated)  RPR         3rd TM syphilis scrn (repeated) TP-Ab  Non-Reactive  Non-Reactive 12/15/24 0803    3rd TM syphilis screen TB-Ab (FTA)  Non-Reactive  Non-Reactive 12/15/24 0803    Syphilis cascade test TP-Ab (EIA)        Syphilis cascade TPPA        GTT Fasting        GTT 1 Hr        GTT 2 Hr        GTT 3 Hr        Group B Strep  Negative  Negative 11/20/24 1636              Other testing        Test Value Reference Range Date Time    Parvo IgG         CMV IgG                   Drug Screening       Test Value Reference Range Date Time    Amphetamine Screen   Negative  Negative 12/15/24 0803    Barbiturate Screen  Negative  Negative 12/15/24 0803       Negative  Negative 06/27/24 1537    Benzodiazepine Screen  Negative  Negative 12/15/24 0803       Negative  Negative 06/27/24 1537    Methadone Screen  Negative  Negative 12/15/24 0803       Negative  Negative 06/27/24 1537    Phencyclidine Screen  Negative  Negative 12/15/24 0803    Opiates Screen  Negative  Negative 12/15/24 0803       Negative  Negative 06/27/24 1537    THC Screen  Negative  Negative 12/15/24 0803       Negative  Negative 06/27/24 1537    Cocaine Screen  Negative  Negative 12/15/24 0803       Negative  Negative 06/27/24 1537    Propoxyphene Screen        Buprenorphine Screen  Negative  Negative 12/15/24 0803    Methamphetamine Screen  Negative  Negative 12/15/24 0803    Oxycodone Screen  Negative  Negative 12/15/24 0803       Negative  Negative 06/27/24 1537    Tricyclic Antidepressants Screen  Negative  Negative 12/15/24 0803              Legend    ^: Historical                          External Prenatal Results       Pregnancy Outside Results - Transcribed From Office Records - See Scanned Records For Details       Test Value Date Time    ABO  A  12/15/24 0803    Rh  Positive  12/15/24 0803    Antibody Screen  Negative  12/15/24 0803       Negative  06/27/24 1527    Varicella IgG       Rubella  1.05 index 06/27/24 1527    Hgb  11.4 g/dL 12/22/24 0055       11.2 g/dL 12/20/24 2211       11.0 g/dL 12/16/24 0547       12.6 g/dL 12/15/24 0803       12.5 g/dL 12/05/24 1603       10.9 g/dL 08/27/24 1603       12.1 g/dL 06/27/24 1527       12.8 g/dL 04/25/24 0932    Hct  33.8 % 12/22/24 0055       33.8 % 12/20/24 2211       34.1 % 12/16/24 0547       37.4 % 12/15/24 0803       36.0 % 12/05/24 1603       32.1 % 08/27/24 1603       35.9 % 06/27/24 1527       37.7 % 04/25/24 0932    HgB A1c   5.50 % 06/27/24 1527       5.60 % 04/25/24 0932    1h GTT  190 mg/dL 08/27/24 1603    3h GTT Fasting       3h GTT 1 hour        3h GTT 2 hour       3h GTT 3 hour        Gonorrhea (discrete)  Negative  06/27/24 1537    Chlamydia (discrete)  Negative  06/27/24 1537    RPR  Non Reactive  08/27/24 1603    Syphils cascade: TP-Ab (FTA)  Non-Reactive  12/15/24 0803       Non-Reactive  06/27/24 1527    TP-Ab  Non-Reactive  12/15/24 0803       Non-Reactive  06/27/24 1527    TP-Ab (EIA)       TPPA       HBsAg  Non-Reactive  06/27/24 1527    Herpes Simplex Virus PCR       Herpes Simplex VIrus Culture       HIV  Non-Reactive  06/27/24 1527    Hep C RNA Quant PCR       Hep C Antibody  Non-Reactive  06/27/24 1527    AFP       NIPT       Cystic Fibrosis ([place])       Cystic Fibroisis        Spinal Muscular atrophy       Fragile X       Group B Strep  Negative  11/20/24 1636    GBS Susceptibility to Clindamycin       GBS Susceptibility to Erythromycin       Fetal Fibronectin       Genetic Testing, Maternal Blood                 Drug Screening       Test Value Date Time    Urine Drug Screen       Amphetamine Screen  Negative  12/15/24 0803    Barbiturate Screen  Negative  12/15/24 0803       Negative  06/27/24 1537    Benzodiazepine Screen  Negative  12/15/24 0803       Negative  06/27/24 1537    Methadone Screen  Negative  12/15/24 0803       Negative  06/27/24 1537    Phencyclidine Screen  Negative  12/15/24 0803    Opiates Screen  Negative  12/15/24 0803       Negative  06/27/24 1537    THC Screen  Negative  12/15/24 0803       Negative  06/27/24 1537    Cocaine Screen       Propoxyphene Screen       Buprenorphine Screen  Negative  12/15/24 0803    Methamphetamine Screen       Oxycodone Screen  Negative  12/15/24 0803       Negative  06/27/24 1537    Tricyclic Antidepressants Screen  Negative  12/15/24 0803              Legend    ^: Historical                             Problem List:     Patient Active Problem List   Diagnosis    Palpitations    Tachycardia    Obesity (BMI 30-39.9)    Supervision of other normal pregnancy, antepartum    H/O   section    Hx of gestational diabetes in prior pregnancy, currently pregnant    AMA (advanced maternal age) multigravida 35+    Antepartum placenta circumvallata    Diet controlled gestational diabetes mellitus (GDM), antepartum    Varicose veins of left lower extremity with other complications    Normal pregnancy    Elevated blood-pressure reading without diagnosis of hypertension    Preeclampsia in postpartum period        Past OB History:     OB History    Para Term  AB Living   3 2 2 0 1 2   SAB IAB Ectopic Molar Multiple Live Births   0 0 0 0 0 2      # Outcome Date GA Lbr Phu/2nd Weight Sex Type Anes PTL Lv   3 Term 12/15/24 39w5d  3860 g (8 lb 8.2 oz) F CS-LTranv Spinal N SHAYY      Name: Omar Juan      Apgar1: 9  Apgar5: 9   2 Term     M CS-Unspec   SHAYY   1 AB                Past Medical History: Past Medical History:   Diagnosis Date    Abnormal ECG     Anxiety     Arrhythmia     Heart palpitations     Hyperlipidemia     Migraine     Polycystic ovary syndrome       Past Surgical History Past Surgical History:   Procedure Laterality Date    ANKLE SURGERY Left      SECTION       SECTION WITH TUBAL N/A 12/15/2024    Procedure:  SECTION REPEAT WITH TUBAL;  Surgeon: Kristal Torres DO;  Location: [facility] LABOR DELIVERY;  Service: Gynecology;  Laterality: N/A;      Family History: Family History   Problem Relation Age of Onset    Mental illness Father     Drug abuse Father     Alcohol abuse Father     Depression Father     Anxiety disorder Father     Hyperlipidemia Father     Hypertension Father     Diabetes Father     Liver disease Mother     Heart disease Mother     Depression Mother     Anxiety disorder Mother     Hyperlipidemia Mother     Hypertension Mother     Diabetes Mother     Alcohol abuse Mother     Drug abuse Mother     Breast cancer Sister     Mental illness Sister     Thyroid disease Maternal Aunt     Hyperlipidemia Maternal Aunt      Hypertension Maternal Aunt     Hyperlipidemia Maternal Aunt     Hypertension Maternal Aunt     Ovarian cancer Neg Hx     Uterine cancer Neg Hx     Colon cancer Neg Hx     Deep vein thrombosis Neg Hx     Pulmonary embolism Neg Hx       Social History:  reports that she quit smoking about 3 years ago. Her smoking use included cigarettes. She started smoking about 18 years ago. She has a 15 pack-year smoking history. She has never used smokeless tobacco.   reports that she does not currently use alcohol.   reports no history of drug use.        General ROS: Pertinent items are noted in HPI        Home Medications:  Enoxaparin Sodium, cholecalciferol, docusate sodium, ibuprofen, prenatal vitamin, and vitamin B-12    Allergies:  Allergies   Allergen Reactions    Erythromycin Hives       Objective       Vital Signs Range for the last 24 hours  Temperature: Temp:  [97.7 °F (36.5 °C)] 97.7 °F (36.5 °C)   Temp Source: Temp src: Oral   BP: BP: (101-152)/(55-87) 111/62   Pulse: Heart Rate:  [47-68] 68   Respirations: Resp:  [16-18] 16   SPO2: SpO2:  [94 %-100 %] 100 %     Physical Examination:   General appearance - alert, well appearing, and in no distress  Mental status - alert, oriented to person, place, and time  Chest - clear to auscultation  Heart - normal rate, regular rhythm,  Abdomen - soft, nontender, nondistended,   Pelvic - normal external genitalia, vulva, vagina, cervix, and uterus   Back exam - full range of motion, no tenderness or pain on motion  Neurological - alert, oriented, normal speech  Extremities - peripheral pulses normal  Skin - normal coloration and turgor, no suspicious skin lesions noted        Lab Results (last 24 hours)       Procedure Component Value Units Date/Time    Protein / Creatinine Ratio, Urine - Urine, Clean Catch [[id number]] Collected: 12/22/24 0054    Specimen: Urine, Clean Catch Updated: 12/22/24 0119     Creatinine, Urine 51.9 mg/dL      Total Protein, Urine 11.0 mg/dL       Protein/Creatinine Ratio, Urine 0.21    CBC & Differential [[id number]]  (Abnormal) Collected: 12/22/24 0055    Specimen: Blood Updated: 12/22/24 0103    Narrative:      The following orders were created for panel order CBC & Differential.  Procedure                               Abnormality         Status                     ---------                               -----------         ------                     CBC Auto Differential[[id number]]        Abnormal            Final result                 Please view results for these tests on the individual orders.    Comprehensive Metabolic Panel [[id number]]  (Abnormal) Collected: 12/22/24 0055    Specimen: Blood Updated: 12/22/24 0127     Glucose 96 mg/dL      BUN 10 mg/dL      Creatinine 0.46 mg/dL      Sodium 138 mmol/L      Potassium 3.5 mmol/L      Chloride 105 mmol/L      CO2 21.7 mmol/L      Calcium 8.9 mg/dL      Total Protein 6.7 g/dL      Albumin 3.6 g/dL      ALT (SGPT) 29 U/L      AST (SGOT) 23 U/L      Alkaline Phosphatase 78 U/L      Total Bilirubin 0.2 mg/dL      Globulin 3.1 gm/dL      A/G Ratio 1.2 g/dL      BUN/Creatinine Ratio 21.7     Anion Gap 11.3 mmol/L      eGFR 127.4 mL/min/1.73     Narrative:      GFR Categories in Chronic Kidney Disease (CKD)      GFR Category          GFR (mL/min/1.73)    Interpretation  G1                     90 or greater         Normal or high (1)  G2                      60-89                Mild decrease (1)  G3a                   45-59                Mild to moderate decrease  G3b                   30-44                Moderate to severe decrease  G4                    15-29                Severe decrease  G5                    14 or less           Kidney failure          (1)In the absence of evidence of kidney disease, neither GFR category G1 or G2 fulfill the criteria for CKD.    eGFR calculation 2021 CKD-EPI creatinine equation, which does not include race as a factor    CBC Auto Differential [[id number]]  (Abnormal)  Collected: 12/22/24 0055    Specimen: Blood Updated: 12/22/24 0103     WBC 8.98 10*3/mm3      RBC 3.49 10*6/mm3      Hemoglobin 11.4 g/dL      Hematocrit 33.8 %      MCV 96.8 fL      MCH 32.7 pg      MCHC 33.7 g/dL      RDW 11.9 %      RDW-SD 42.0 fl      MPV 10.1 fL      Platelets 284 10*3/mm3      Neutrophil % 64.7 %      Lymphocyte % 25.2 %      Monocyte % 8.7 %      Eosinophil % 0.7 %      Basophil % 0.3 %      Immature Grans % 0.4 %      Neutrophils, Absolute 5.81 10*3/mm3      Lymphocytes, Absolute 2.26 10*3/mm3      Monocytes, Absolute 0.78 10*3/mm3      Eosinophils, Absolute 0.06 10*3/mm3      Basophils, Absolute 0.03 10*3/mm3      Immature Grans, Absolute 0.04 10*3/mm3      nRBC 0.0 /100 WBC                Assessment & Plan       Preeclampsia in postpartum period        A/P  Preeclampsia PP    Will start magnesium x approx 24hrs;  HA improving, labs wnl  2.  Plan of care has been reviewed with patient and patient agrees.   3.  Risks, benefits of treatment plan have been discussed.  4.  All questions have been answered.        Electronically signed by Lexis Donaldson MD, 12/22/24, 7:34 AM EST.

## 2024-12-22 NOTE — NURSING NOTE
, 6 days Postpartum from C/S. Presented with complaints of High B/P at home all day since being seen here yesterday. Complains of headache that is unrelieved with tylenol. Mild, non-pitting, lower and upper extremity edema noted to hands and feet, bilateral. States post op pain is well controlled with Tylenol and Motrin, not requiring narcotics.   Dr Sam notified of the above, orders received for labs and fioricet.

## 2024-12-22 NOTE — PLAN OF CARE
Goal Outcome Evaluation:  Plan of Care Reviewed With: patient        Progress: improving  Outcome Evaluation: pt BP WNL through out shift. patient no longer has a headache as of shift change this morning. patient was given motrin for neck pain pain 1/10 and quickly improved. I&Os measured and WNL. no other complaints reguarding PreE have been noted this shift.

## 2024-12-23 PROCEDURE — 25010000002 ENOXAPARIN PER 10 MG: Performed by: OBSTETRICS & GYNECOLOGY

## 2024-12-23 RX ORDER — OXYCODONE HYDROCHLORIDE 5 MG/1
5 TABLET ORAL EVERY 4 HOURS PRN
Status: CANCELLED | OUTPATIENT
Start: 2024-12-23 | End: 2024-12-30

## 2024-12-23 RX ORDER — IBUPROFEN 800 MG/1
800 TABLET, FILM COATED ORAL EVERY 8 HOURS PRN
Status: CANCELLED | OUTPATIENT
Start: 2024-12-23

## 2024-12-23 RX ORDER — ONDANSETRON 2 MG/ML
4 INJECTION INTRAMUSCULAR; INTRAVENOUS EVERY 6 HOURS PRN
Status: CANCELLED | OUTPATIENT
Start: 2024-12-23

## 2024-12-23 RX ORDER — PRENATAL VIT/IRON FUM/FOLIC AC 27MG-0.8MG
1 TABLET ORAL DAILY
Status: CANCELLED | OUTPATIENT
Start: 2024-12-23

## 2024-12-23 RX ORDER — CALCIUM CARBONATE 500 MG/1
1 TABLET, CHEWABLE ORAL EVERY 4 HOURS PRN
Status: CANCELLED | OUTPATIENT
Start: 2024-12-23

## 2024-12-23 RX ORDER — DOCUSATE SODIUM 100 MG/1
100 CAPSULE, LIQUID FILLED ORAL 2 TIMES DAILY PRN
Status: CANCELLED | OUTPATIENT
Start: 2024-12-23

## 2024-12-23 RX ORDER — ACETAMINOPHEN 325 MG/1
650 TABLET ORAL EVERY 6 HOURS
Status: CANCELLED | OUTPATIENT
Start: 2024-12-24

## 2024-12-23 RX ORDER — ALUMINA, MAGNESIA, AND SIMETHICONE 2400; 2400; 240 MG/30ML; MG/30ML; MG/30ML
15 SUSPENSION ORAL EVERY 4 HOURS PRN
Status: CANCELLED | OUTPATIENT
Start: 2024-12-23

## 2024-12-23 RX ORDER — OXYCODONE HYDROCHLORIDE 5 MG/1
10 TABLET ORAL EVERY 4 HOURS PRN
Status: CANCELLED | OUTPATIENT
Start: 2024-12-23 | End: 2024-12-30

## 2024-12-23 RX ORDER — ACETAMINOPHEN 500 MG
1000 TABLET ORAL EVERY 6 HOURS
Status: CANCELLED | OUTPATIENT
Start: 2024-12-23 | End: 2024-12-24

## 2024-12-23 RX ADMIN — ENOXAPARIN SODIUM 40 MG: 100 INJECTION SUBCUTANEOUS at 14:40

## 2024-12-23 RX ADMIN — IBUPROFEN 800 MG: 800 TABLET, FILM COATED ORAL at 17:02

## 2024-12-23 NOTE — PROGRESS NOTES
LESLYE Renetta  [facility] Delivery Progress Note    Subjective   Postpartum Day 7: [facility] Delivery  Hosp Day 1    The patient feels well.  Her pain is well controlled.   She is ambulating well.  Patient describes her bleeding as moderate lochia.  Patient is tolerating regular diet and urinating without difficulty.  She denies headache, vision changes, nausea, vomiting, epigastric or abdominal pain.  Currently off magnesium.      Breastfeeding:  Breastfeeding.    Objective     Vital Signs Range for the last 24 hours  Temperature: Temp:  [97.4 °F (36.3 °C)-98.4 °F (36.9 °C)] 98.3 °F (36.8 °C)   Temp Source: Temp src: Oral   BP: BP: (106-140)/(54-85) 138/83   Pulse: Heart Rate:  [55-89] 69   Respirations: Resp:  [16-19] 18     Vitals:    12/22/24 2146 12/22/24 2344 12/23/24 0123 12/23/24 0522   BP: 135/82 140/83 136/77 138/83   BP Location: Left arm Left arm Left arm Left arm   Patient Position: Sitting Sitting Sitting Sitting   Pulse: 78 75 76 69   Resp: 16  18 18   Temp: 98.2 °F (36.8 °C)   98.3 °F (36.8 °C)   TempSrc: Oral   Oral   SpO2:              Physical Exam:  General:  no acute distress.  Abdomen: abdomen is soft without significant tenderness, masses, organomegaly or guarding.   Fundus: appropriate, firm, non tender, moderate lochia  Incision: clean, dry and intact  Extremities: normal, atraumatic, no cyanosis, and trace edema.     Rubella:   Rubella Antibodies, IgG   Date Value Ref Range Status   06/27/2024 1.05 Immune >0.99 index Final     Comment:                                     Non-immune       <0.90                                  Equivocal  0.90 - 0.99                                  Immune           >0.99     Rh Status:    RH type   Date Value Ref Range Status   12/15/2024 Positive  Final     Immunizations:   Immunization History   Administered Date(s) Administered    ABRYSVO (RSV, 60+ or pregnant women 32-36 wks) 11/20/2024    COVID-19 (MODERNA) 1st,2nd,3rd Dose Monovalent 08/06/2021, 09/15/2021     Fluzone  >6mos 2024    Fluzone Quad >6mos (Multi-dose) 10/08/2019       Assessment & Plan       Preeclampsia in postpartum period      Yarely N Paty is Day 7  post-partum  , Low Transverse  .      Plan:  Continue current care.            Stable off magnesium sulfate.              Transfwer to post partum.            Will observe x24 hours.              Discharge to home in am if BP's less than 140/90's.    Evy Mccord MD              Electronically signed by Evy Mccord MD, 24, 7:35 AM EST.

## 2024-12-23 NOTE — PLAN OF CARE
Goal Outcome Evaluation:           Progress: improving  Outcome Evaluation: Bloood pressures 130-146/75-83.  No complaints if headaceh this shift.  Denies visual changes.  DTR's 2+ bilaterally, lungs clear, UOP this shift.  Up ad rafat in room, shower done.  Probable d/c in am

## 2024-12-23 NOTE — PLAN OF CARE
Goal Outcome Evaluation:           Progress: improving  Outcome Evaluation: Blood pressures running 130-140 systolic and 70-80 diastolic. Headache x 1 this shift relieved with motrin x 1. UOP good. No edema or visual disturbances. Magnesium discontinued 12/22/2024 @ 2146 when patient dislodged IV site (per MD order). DTRs WNL and no clonus.

## 2024-12-24 VITALS
SYSTOLIC BLOOD PRESSURE: 131 MMHG | RESPIRATION RATE: 16 BRPM | OXYGEN SATURATION: 100 % | HEART RATE: 58 BPM | TEMPERATURE: 98.2 F | DIASTOLIC BLOOD PRESSURE: 73 MMHG

## 2024-12-24 NOTE — PLAN OF CARE
Problem: Adult Inpatient Plan of Care  Goal: Plan of Care Review  Outcome: Met  Flowsheets (Taken 12/24/2024 0823)  Outcome Evaluation: BP remains WNL without bp medications, no complaints of headache, visual disturbances or changes, pain WNL no medication requested. other assessment findings WNL  Goal: Patient-Specific Goal (Individualized)  Outcome: Met  Goal: Absence of Hospital-Acquired Illness or Injury  Outcome: Met  Goal: Optimal Comfort and Wellbeing  Outcome: Met  Intervention: Provide Person-Centered Care  Recent Flowsheet Documentation  Taken 12/24/2024 0800 by Mateo Suggs RN  Trust Relationship/Rapport:   care explained   questions answered   questions encouraged   reassurance provided   choices provided  Goal: Readiness for Transition of Care  Outcome: Met     Problem: Hypertensive Disorders in Pregnancy  Goal: Patient-Fetal Stabilization  Outcome: Met  Goal: Patient-Fetal Stabilization  Outcome: Met   Goal Outcome Evaluation:              Outcome Evaluation: BP remains WNL without bp medications, no complaints of headache, visual disturbances or changes, pain WNL no medication requested. other assessment findings WNL

## 2024-12-24 NOTE — DISCHARGE SUMMARY
"GYN Discharge Summary      Admit Date:  12/22/2024  Discharge Date: 12/24/24    Reason for Admission:  Preeclampsia in postpartum period [O14.95]    Final Diagnosis:  PP preeclampsia  To do at FU: keep appointmen      Hospital Course/Signifcant Findings/Treatment/Procedure performed:  Pt was admitted with severe HA with mild BP and received 20hrs of magnesium until her IV became dislodged;  her hospital course was unremarkable and pt did not receive any anti - hypertensive    Results from last 7 days   Lab Units 12/22/24  0055 12/20/24  2211   WBC 10*3/mm3 8.98 9.03   HEMOGLOBIN g/dL 11.4* 11.2*   HEMATOCRIT % 33.8* 33.8*   PLATELETS 10*3/mm3 284 270       Results from last 7 days   Lab Units 12/22/24  0055 12/20/24  2211   GLUCOSE mg/dL 96 94   CREATININE mg/dL 0.46* 0.42*   SODIUM mmol/L 138 139   POTASSIUM mmol/L 3.5 3.9   CHLORIDE mmol/L 105 104   AST (SGOT) U/L 23 23   ALT (SGPT) U/L 29 20       Lab Results   Component Value Date    CREATININEUR 51.9 12/22/2024    PROTEINUR 11.0 12/22/2024    UTPCR 0.21 12/22/2024                     No results found for: \"FIBRINOGEN\"    Results for orders placed or performed during the hospital encounter of 12/22/24   Protein / Creatinine Ratio, Urine - Urine, Clean Catch    Collection Time: 12/22/24 12:54 AM    Specimen: Urine, Clean Catch   Result Value Ref Range    Creatinine, Urine 51.9 mg/dL    Total Protein, Urine 11.0 mg/dL    Protein/Creatinine Ratio, Urine 0.21    Comprehensive Metabolic Panel    Collection Time: 12/22/24 12:55 AM    Specimen: Blood   Result Value Ref Range    Glucose 96 65 - 99 mg/dL    BUN 10 6 - 20 mg/dL    Creatinine 0.46 (L) 0.57 - 1.00 mg/dL    Sodium 138 136 - 145 mmol/L    Potassium 3.5 3.5 - 5.2 mmol/L    Chloride 105 98 - 107 mmol/L    CO2 21.7 (L) 22.0 - 29.0 mmol/L    Calcium 8.9 8.6 - 10.5 mg/dL    Total Protein 6.7 6.0 - 8.5 g/dL    Albumin 3.6 3.5 - 5.2 g/dL    ALT (SGPT) 29 1 - 33 U/L    AST (SGOT) 23 1 - 32 U/L    Alkaline Phosphatase " 78 39 - 117 U/L    Total Bilirubin 0.2 0.0 - 1.2 mg/dL    Globulin 3.1 gm/dL    A/G Ratio 1.2 g/dL    BUN/Creatinine Ratio 21.7 7.0 - 25.0    Anion Gap 11.3 5.0 - 15.0 mmol/L    eGFR 127.4 >60.0 mL/min/1.73   CBC Auto Differential    Collection Time: 12/22/24 12:55 AM    Specimen: Blood   Result Value Ref Range    WBC 8.98 3.40 - 10.80 10*3/mm3    RBC 3.49 (L) 3.77 - 5.28 10*6/mm3    Hemoglobin 11.4 (L) 12.0 - 15.9 g/dL    Hematocrit 33.8 (L) 34.0 - 46.6 %    MCV 96.8 79.0 - 97.0 fL    MCH 32.7 26.6 - 33.0 pg    MCHC 33.7 31.5 - 35.7 g/dL    RDW 11.9 (L) 12.3 - 15.4 %    RDW-SD 42.0 37.0 - 54.0 fl    MPV 10.1 6.0 - 12.0 fL    Platelets 284 140 - 450 10*3/mm3    Neutrophil % 64.7 42.7 - 76.0 %    Lymphocyte % 25.2 19.6 - 45.3 %    Monocyte % 8.7 5.0 - 12.0 %    Eosinophil % 0.7 0.3 - 6.2 %    Basophil % 0.3 0.0 - 1.5 %    Immature Grans % 0.4 0.0 - 0.5 %    Neutrophils, Absolute 5.81 1.70 - 7.00 10*3/mm3    Lymphocytes, Absolute 2.26 0.70 - 3.10 10*3/mm3    Monocytes, Absolute 0.78 0.10 - 0.90 10*3/mm3    Eosinophils, Absolute 0.06 0.00 - 0.40 10*3/mm3    Basophils, Absolute 0.03 0.00 - 0.20 10*3/mm3    Immature Grans, Absolute 0.04 0.00 - 0.05 10*3/mm3    nRBC 0.0 0.0 - 0.2 /100 WBC   ECG 12 Lead Bradycardia    Collection Time: 12/22/24  7:24 AM   Result Value Ref Range    QT Interval 469 ms    QTC Interval 469 ms          Discharge Medications        Continue These Medications        Instructions Start Date   cholecalciferol 25 MCG (1000 UT) tablet  Commonly known as: VITAMIN D3   1,000 Units, 2 Times Daily      docusate sodium 100 MG capsule  Commonly known as: Colace   100 mg, Oral, 2 Times Daily PRN      Enoxaparin Sodium 40 MG/0.4ML solution prefilled syringe syringe  Commonly known as: LOVENOX   40 mg, Subcutaneous, Every 24 Hours Scheduled      ibuprofen 800 MG tablet  Commonly known as: ADVIL,MOTRIN   800 mg, Oral, Every 8 Hours PRN      prenatal (CLASSIC) vitamin 28-0.8 MG tablet tablet  Generic drug:  prenatal vitamin   Daily      vitamin B-12 500 MCG tablet  Commonly known as: CYANOCOBALAMIN   500 mcg, Daily                Diet: Regular     Activity: Pelvic Rest: 6 weeks  No heavy lifting >20lbs  No tub bath or pool 2 weeks     Condition at discharge: Good     Follow up with: Dr. Link     Follow up in: Thursday      Complications: None     Disposition: Home    [unfilled]

## 2024-12-24 NOTE — PLAN OF CARE
Goal Outcome Evaluation:      Vital signs WNL. Denies any headache. Voiding adequately.

## 2024-12-24 NOTE — PROGRESS NOTES
GYN PROGRESS NOTE        Chief complaint:   Chief Complaint   Patient presents with    Elevated Blood Pressure       Subjective:  Pt feeling much better, no HA, vision changes      Objective:     Vitals: reviewed  General- NAD, alert and oriented, appropriate  Psych- Normal mood, good memory  Abdomen- Soft, non distended, non tender, normal bowel sounds  Incision/s-  c/d/i  Extremities- No edema    Results from last 7 days   Lab Units 12/22/24  0055 12/20/24  2211   WBC 10*3/mm3 8.98 9.03   HEMOGLOBIN g/dL 11.4* 11.2*   HEMATOCRIT % 33.8* 33.8*   PLATELETS 10*3/mm3 284 270       Results from last 7 days   Lab Units 12/22/24  0055 12/20/24  2211   SODIUM mmol/L 138 139   POTASSIUM mmol/L 3.5 3.9   CHLORIDE mmol/L 105 104   CO2 mmol/L 21.7* 23.0   BUN mg/dL 10 14   CREATININE mg/dL 0.46* 0.42*   CALCIUM mg/dL 8.9 9.2   BILIRUBIN mg/dL 0.2 0.2   ALK PHOS U/L 78 81   ALT (SGPT) U/L 29 20   AST (SGOT) U/L 23 23   GLUCOSE mg/dL 96 94         Intake/Output Summary (Last 24 hours) at 12/24/2024 0759  Last data filed at 12/24/2024 0500  Gross per 24 hour   Intake 250 ml   Output 3200 ml   Net -2950 ml          Assessment:    HD 3  PP preE    Plan:   Pt doing much better, will d/c home        Electronically signed by Lexis Donaldson MD, 12/24/24, 7:59 AM EST.

## 2024-12-26 ENCOUNTER — POSTPARTUM VISIT (OUTPATIENT)
Dept: OBSTETRICS AND GYNECOLOGY | Facility: CLINIC | Age: 34
End: 2024-12-26
Payer: COMMERCIAL

## 2024-12-26 ENCOUNTER — TELEPHONE (OUTPATIENT)
Dept: LACTATION | Facility: HOSPITAL | Age: 34
End: 2024-12-26
Payer: COMMERCIAL

## 2024-12-26 ENCOUNTER — HOSPITAL ENCOUNTER (OUTPATIENT)
Facility: HOSPITAL | Age: 34
Discharge: HOME | End: 2024-12-26
Attending: OBSTETRICS & GYNECOLOGY | Admitting: OBSTETRICS & GYNECOLOGY
Payer: COMMERCIAL

## 2024-12-26 VITALS — HEART RATE: 73 BPM | SYSTOLIC BLOOD PRESSURE: 139 MMHG | DIASTOLIC BLOOD PRESSURE: 82 MMHG

## 2024-12-26 VITALS
WEIGHT: 193 LBS | HEART RATE: 79 BPM | DIASTOLIC BLOOD PRESSURE: 91 MMHG | SYSTOLIC BLOOD PRESSURE: 170 MMHG | BODY MASS INDEX: 30.23 KG/M2

## 2024-12-26 LAB
ALBUMIN SERPL-MCNC: 3.9 G/DL (ref 3.5–5.2)
ALBUMIN/GLOB SERPL: 1.3 G/DL
ALP SERPL-CCNC: 74 U/L (ref 39–117)
ALT SERPL W P-5'-P-CCNC: 26 U/L (ref 1–33)
ANION GAP SERPL CALCULATED.3IONS-SCNC: 11.2 MMOL/L (ref 5–15)
AST SERPL-CCNC: 19 U/L (ref 1–32)
BILIRUB SERPL-MCNC: 0.2 MG/DL (ref 0–1.2)
BUN SERPL-MCNC: 12 MG/DL (ref 6–20)
BUN/CREAT SERPL: 22.6 (ref 7–25)
CALCIUM SPEC-SCNC: 9.6 MG/DL (ref 8.6–10.5)
CHLORIDE SERPL-SCNC: 104 MMOL/L (ref 98–107)
CO2 SERPL-SCNC: 22.8 MMOL/L (ref 22–29)
CREAT SERPL-MCNC: 0.53 MG/DL (ref 0.57–1)
CREAT UR-MCNC: 53.5 MG/DL
DEPRECATED RDW RBC AUTO: 41.5 FL (ref 37–54)
EGFRCR SERPLBLD CKD-EPI 2021: 123.1 ML/MIN/1.73
ERYTHROCYTE [DISTWIDTH] IN BLOOD BY AUTOMATED COUNT: 11.8 % (ref 12.3–15.4)
GLOBULIN UR ELPH-MCNC: 3 GM/DL
GLUCOSE SERPL-MCNC: 91 MG/DL (ref 65–99)
HCT VFR BLD AUTO: 37.2 % (ref 34–46.6)
HGB BLD-MCNC: 12.5 G/DL (ref 12–15.9)
MCH RBC QN AUTO: 32.6 PG (ref 26.6–33)
MCHC RBC AUTO-ENTMCNC: 33.6 G/DL (ref 31.5–35.7)
MCV RBC AUTO: 97.1 FL (ref 79–97)
PLATELET # BLD AUTO: 345 10*3/MM3 (ref 140–450)
PMV BLD AUTO: 9.8 FL (ref 6–12)
POTASSIUM SERPL-SCNC: 3.8 MMOL/L (ref 3.5–5.2)
PROT ?TM UR-MCNC: 6.2 MG/DL
PROT SERPL-MCNC: 6.9 G/DL (ref 6–8.5)
PROT/CREAT UR: 0.12 MG/G{CREAT}
QT INTERVAL: 469 MS
QTC INTERVAL: 469 MS
RBC # BLD AUTO: 3.83 10*6/MM3 (ref 3.77–5.28)
SODIUM SERPL-SCNC: 138 MMOL/L (ref 136–145)
WBC NRBC COR # BLD AUTO: 6.61 10*3/MM3 (ref 3.4–10.8)

## 2024-12-26 PROCEDURE — 82570 ASSAY OF URINE CREATININE: CPT | Performed by: OBSTETRICS & GYNECOLOGY

## 2024-12-26 PROCEDURE — 80053 COMPREHEN METABOLIC PANEL: CPT | Performed by: OBSTETRICS & GYNECOLOGY

## 2024-12-26 PROCEDURE — G0463 HOSPITAL OUTPT CLINIC VISIT: HCPCS

## 2024-12-26 PROCEDURE — 85027 COMPLETE CBC AUTOMATED: CPT | Performed by: OBSTETRICS & GYNECOLOGY

## 2024-12-26 PROCEDURE — 84156 ASSAY OF PROTEIN URINE: CPT | Performed by: OBSTETRICS & GYNECOLOGY

## 2024-12-26 RX ORDER — NIFEDIPINE 30 MG/1
30 TABLET, EXTENDED RELEASE ORAL DAILY
Qty: 30 TABLET | Refills: 1 | Status: SHIPPED | OUTPATIENT
Start: 2024-12-26

## 2024-12-26 RX ORDER — LABETALOL 100 MG/1
200 TABLET, FILM COATED ORAL ONCE
Status: COMPLETED | OUTPATIENT
Start: 2024-12-26 | End: 2024-12-26

## 2024-12-26 RX ADMIN — LABETALOL HYDROCHLORIDE 200 MG: 100 TABLET, FILM COATED ORAL at 12:15

## 2024-12-26 NOTE — PROGRESS NOTES
Yarely Newman here for blood pressure check.  Had a RLTCS one week ago and then went back due to persistent headache. She was diagnosed with postpartum preeclampsia and did receive 24 hrs of magnesium sulfate. Her Bps stabilized and she did not need BP medication.She denies any HA, vision changes or RUQ pain. Her Bp today is significantly high, she has a dull headache but overall feels ok. Since BP is so high I do recommend she be re-evaluated in triage and start medication there as well as repeat labs to make sure nothing underlying is being missed. She agrees, L&D and laborist notified.     Vitals:    12/26/24 1130 12/26/24 1225   BP: 169/96 170/91   Pulse: 79    Weight: 87.5 kg (193 lb)        Body mass index is 30.23 kg/m².         Impression  Diagnoses and all orders for this visit:    1. Preeclampsia in postpartum period (Primary)  Comments:  Sent to L&D to start BP meds and to observe          Plan  Follow up in 1-2 weeks for BP check    Susi Link,

## 2024-12-26 NOTE — H&P
[facility]  Obstetric History and Physical    No chief complaint on file.  Post partum preEclampsia    Subjective     HPI:    Patient is a 34 y.o. female  currently 5 days post op repeat CS, who presents with elevated blood pressure in the office (171/106).  She was recently admitted on  and treated with 20 hours of magnesium sulfate and discharged to home on no medications on 24.  Once admitted to triage her BP was elevated to 142/77.  PreEclampsia labs were repeated and she was given Labetalol 200 mg po x1.  PCR was unchanged at 0.21.  She has a history of sinus bradycardia during her admission with a normal EKG.  Heart rate on admission was 72 bpm.      PNC provided by:  Adam. Her prenatal care is complicated by   Patient Active Problem List   Diagnosis    Palpitations    Tachycardia    Obesity (BMI 30-39.9)    Supervision of other normal pregnancy, antepartum    H/O  section    Hx of gestational diabetes in prior pregnancy, currently pregnant    AMA (advanced maternal age) multigravida 35+    Antepartum placenta circumvallata    Diet controlled gestational diabetes mellitus (GDM), antepartum    Varicose veins of left lower extremity with other complications    Normal pregnancy    Elevated blood-pressure reading without diagnosis of hypertension    Preeclampsia in postpartum period        Her previous obstetric/gynecological history is noted for   with previous CS x2 and SAB x1 now with post partum preEclampsia .    The following portions of the patients history were reviewed and updated as appropriate:   current medications, allergies, past medical history, past surgical history, past family history, past social history and current problem list.     Prenatal Information:  Prenatal Results       Initial Prenatal Labs       Test Value Reference Range Date Time    Hemoglobin  12.1 g/dL 12.0 - 15.9 24 1527       12.8 g/dL 12.0 - 15.9 24 0932    Hematocrit  35.9 % 34.0 -  46.6 06/27/24 1527       37.7 % 34.0 - 46.6 04/25/24 0932    Platelets  279 10*3/mm3 140 - 450 06/27/24 1527       330 10*3/mm3 140 - 450 04/25/24 0932    Rubella IgG  1.05 index Immune >0.99 06/27/24 1527    Hepatitis B SAg  Non-Reactive  Non-Reactive 06/27/24 1527    Hepatitis C Ab  Non-Reactive  Non-Reactive 06/27/24 1527    RPR  Non Reactive  Non Reactive 08/27/24 1603    T. Pallidum Ab   Non-Reactive  Non-Reactive 12/15/24 0803       Non-Reactive  Non-Reactive 06/27/24 1527    ABO  A   12/15/24 0803    Rh  Positive   12/15/24 0803    Antibody Screen  Negative   06/27/24 1527    HIV  Non-Reactive  Non-Reactive 06/27/24 1527    Urine Culture  No growth   06/27/24 1537    Gonorrhea  Negative  Negative 06/27/24 1537    Chlamydia  Negative  Negative 06/27/24 1537    TSH        HgB A1c   5.50 % 4.80 - 5.60 06/27/24 1527       5.60 % 4.80 - 5.60 04/25/24 0932    Varicella IgG        Hemoglobinopathy Fractionation  Comment   06/27/24 1527    Hemoglobinopathy (genetic testing)        Cystic fibrosis         Spinal muscular atrophy        Fragile X                  Fetal testing        Test Value Reference Range Date Time    NIPT        MSAFP        AFP-4                  2nd and 3rd Trimester       Test Value Reference Range Date Time    Hemoglobin (repeated)  12.5 g/dL 12.0 - 15.9 12/26/24 1207       11.4 g/dL 12.0 - 15.9 12/22/24 0055       11.2 g/dL 12.0 - 15.9 12/20/24 2211       11.0 g/dL 12.0 - 15.9 12/16/24 0547       12.6 g/dL 12.0 - 15.9 12/15/24 0803       12.5 g/dL 12.0 - 15.9 12/05/24 1603       10.9 g/dL 12.0 - 15.9 08/27/24 1603    Hematocrit (repeated)  37.2 % 34.0 - 46.6 12/26/24 1207       33.8 % 34.0 - 46.6 12/22/24 0055       33.8 % 34.0 - 46.6 12/20/24 2211       34.1 % 34.0 - 46.6 12/16/24 0547       37.4 % 34.0 - 46.6 12/15/24 0803       36.0 % 34.0 - 46.6 12/05/24 1603       32.1 % 34.0 - 46.6 08/27/24 1603    Platelets   345 10*3/mm3 140 - 450 12/26/24 1207       284 10*3/mm3 140 - 450 12/22/24  0055       270 10*3/mm3 140 - 450 12/20/24 2211       219 10*3/mm3 140 - 450 12/16/24 0547       246 10*3/mm3 140 - 450 12/15/24 0803       260 10*3/mm3 140 - 450 12/05/24 1603       251 10*3/mm3 140 - 450 08/27/24 1603       279 10*3/mm3 140 - 450 06/27/24 1527       330 10*3/mm3 140 - 450 04/25/24 0932    1 hour GTT   190 mg/dL 65 - 139 08/27/24 1603    Antibody Screen (repeated)  Negative   12/15/24 0803    3rd TM syphilis scrn (repeated)  RPR         3rd TM syphilis scrn (repeated) TP-Ab  Non-Reactive  Non-Reactive 12/15/24 0803    3rd TM syphilis screen TB-Ab (FTA)  Non-Reactive  Non-Reactive 12/15/24 0803    Syphilis cascade test TP-Ab (EIA)        Syphilis cascade TPPA        GTT Fasting        GTT 1 Hr        GTT 2 Hr        GTT 3 Hr        Group B Strep  Negative  Negative 11/20/24 1636              Other testing        Test Value Reference Range Date Time    Parvo IgG         CMV IgG                   Drug Screening       Test Value Reference Range Date Time    Amphetamine Screen  Negative  Negative 12/15/24 0803    Barbiturate Screen  Negative  Negative 12/15/24 0803       Negative  Negative 06/27/24 1537    Benzodiazepine Screen  Negative  Negative 12/15/24 0803       Negative  Negative 06/27/24 1537    Methadone Screen  Negative  Negative 12/15/24 0803       Negative  Negative 06/27/24 1537    Phencyclidine Screen  Negative  Negative 12/15/24 0803    Opiates Screen  Negative  Negative 12/15/24 0803       Negative  Negative 06/27/24 1537    THC Screen  Negative  Negative 12/15/24 0803       Negative  Negative 06/27/24 1537    Cocaine Screen  Negative  Negative 12/15/24 0803       Negative  Negative 06/27/24 1537    Propoxyphene Screen        Buprenorphine Screen  Negative  Negative 12/15/24 0803    Methamphetamine Screen  Negative  Negative 12/15/24 0803    Oxycodone Screen  Negative  Negative 12/15/24 0803       Negative  Negative 06/27/24 1537    Tricyclic Antidepressants Screen  Negative  Negative  12/15/24 0803              Legend    ^: Historical                          External Prenatal Results       Pregnancy Outside Results - Transcribed From Office Records - See Scanned Records For Details       Test Value Date Time    ABO  A  12/15/24 0803    Rh  Positive  12/15/24 0803    Antibody Screen  Negative  12/15/24 0803       Negative  06/27/24 1527    Varicella IgG       Rubella  1.05 index 06/27/24 1527    Hgb  12.5 g/dL 12/26/24 1207       11.4 g/dL 12/22/24 0055       11.2 g/dL 12/20/24 2211       11.0 g/dL 12/16/24 0547       12.6 g/dL 12/15/24 0803       12.5 g/dL 12/05/24 1603       10.9 g/dL 08/27/24 1603       12.1 g/dL 06/27/24 1527       12.8 g/dL 04/25/24 0932    Hct  37.2 % 12/26/24 1207       33.8 % 12/22/24 0055       33.8 % 12/20/24 2211       34.1 % 12/16/24 0547       37.4 % 12/15/24 0803       36.0 % 12/05/24 1603       32.1 % 08/27/24 1603       35.9 % 06/27/24 1527       37.7 % 04/25/24 0932    HgB A1c   5.50 % 06/27/24 1527       5.60 % 04/25/24 0932    1h GTT  190 mg/dL 08/27/24 1603    3h GTT Fasting       3h GTT 1 hour       3h GTT 2 hour       3h GTT 3 hour        Gonorrhea (discrete)  Negative  06/27/24 1537    Chlamydia (discrete)  Negative  06/27/24 1537    RPR  Non Reactive  08/27/24 1603    Syphils cascade: TP-Ab (FTA)  Non-Reactive  12/15/24 0803       Non-Reactive  06/27/24 1527    TP-Ab  Non-Reactive  12/15/24 0803       Non-Reactive  06/27/24 1527    TP-Ab (EIA)       TPPA       HBsAg  Non-Reactive  06/27/24 1527    Herpes Simplex Virus PCR       Herpes Simplex VIrus Culture       HIV  Non-Reactive  06/27/24 1527    Hep C RNA Quant PCR       Hep C Antibody  Non-Reactive  06/27/24 1527    AFP       NIPT       Cystic Fibrosis ([place])       Cystic Fibroisis        Spinal Muscular atrophy       Fragile X       Group B Strep  Negative  11/20/24 1636    GBS Susceptibility to Clindamycin       GBS Susceptibility to Erythromycin       Fetal Fibronectin       Genetic Testing,  Maternal Blood                 Drug Screening       Test Value Date Time    Urine Drug Screen       Amphetamine Screen  Negative  12/15/24 0803    Barbiturate Screen  Negative  12/15/24 0803       Negative  24 1537    Benzodiazepine Screen  Negative  12/15/24 0803       Negative  24 1537    Methadone Screen  Negative  12/15/24 0803       Negative  24 1537    Phencyclidine Screen  Negative  12/15/24 0803    Opiates Screen  Negative  12/15/24 0803       Negative  24 1537    THC Screen  Negative  12/15/24 0803       Negative  24 1537    Cocaine Screen       Propoxyphene Screen       Buprenorphine Screen  Negative  12/15/24 0803    Methamphetamine Screen       Oxycodone Screen  Negative  12/15/24 0803       Negative  24 1537    Tricyclic Antidepressants Screen  Negative  12/15/24 0803              Legend    ^: Historical                             Past OB History:     OB History    Para Term  AB Living   3 2 2 0 1 2   SAB IAB Ectopic Molar Multiple Live Births   0 0 0 0 0 2      # Outcome Date GA Lbr Phu/2nd Weight Sex Type Anes PTL Lv   3 Term 12/15/24 39w5d  3860 g (8 lb 8.2 oz) F CS-LTranv Spinal N SHAYY      Name: Omar Juan      Apgar1: 9  Apgar5: 9   2 Term     M CS-Unspec   SHAYY   1 AB                Past Medical History: Past Medical History:   Diagnosis Date    Abnormal ECG     Anemia 2012    Anxiety     Arrhythmia     Ectopic pregnancy 2009    Gestational diabetes 2012    Heart palpitations     Hyperlipidemia     Migraine     PMS (premenstrual syndrome)     Polycystic ovary syndrome     Urinary tract infection     Varicella       Past Surgical History Past Surgical History:   Procedure Laterality Date    ANKLE SURGERY Left      SECTION       SECTION WITH TUBAL N/A 12/15/2024    Procedure:  SECTION REPEAT WITH TUBAL;  Surgeon: Kristal Torres DO;  Location: [facility] LABOR DELIVERY;  Service: Gynecology;  Laterality: N/A;    WISRHIANNA  TOOTH EXTRACTION        Family History: Family History   Problem Relation Age of Onset    Mental illness Father     Drug abuse Father     Alcohol abuse Father     Depression Father     Anxiety disorder Father     Hyperlipidemia Father     Hypertension Father     Diabetes Father     Liver disease Mother     Heart disease Mother     Depression Mother     Anxiety disorder Mother     Hyperlipidemia Mother     Hypertension Mother     Diabetes Mother     Alcohol abuse Mother     Drug abuse Mother     Breast cancer Sister     Mental illness Sister     Thyroid disease Maternal Aunt     Breast cancer Maternal Aunt     Hyperlipidemia Maternal Aunt     Hypertension Maternal Aunt     Diabetes Maternal Aunt     Hyperlipidemia Maternal Aunt     Hypertension Maternal Aunt     Diabetes Maternal Aunt     Hypertension Sister     Ovarian cancer Neg Hx     Uterine cancer Neg Hx     Colon cancer Neg Hx     Deep vein thrombosis Neg Hx     Pulmonary embolism Neg Hx       Social History:  reports that she quit smoking about 3 years ago. Her smoking use included cigarettes. She started smoking about 18 years ago. She has a 15 pack-year smoking history. She has never used smokeless tobacco.   reports that she does not currently use alcohol.   reports no history of drug use.        General ROS: Pertinent items are noted in HPI    Home Medications:  Enoxaparin Sodium, NIFEdipine XL, cholecalciferol, docusate sodium, ibuprofen, prenatal vitamin, and vitamin B-12    Allergies:  Allergies   Allergen Reactions    Erythromycin Hives       Objective       Vital Signs Range for the last 24 hours  Temperature:     Temp Source:     BP: BP: (128-170)/(70-96) 128/70   Pulse: Heart Rate:  [64-79] 64   Respirations:     SPO2:       Vitals:    12/26/24 1245 12/26/24 1300 12/26/24 1315 12/26/24 1330   BP: 142/77 134/76 129/72 128/70   Pulse: 74 67 67 64        Physical Examination:   General appearance - alert, well appearing, and in no distress  Mental  status - alert, oriented to person, place, and time  Chest - No evidence of resp distress. Good resp effort.  Heart - normal rate, regular rhythm.  Rate was 72 bpm prior to labetalol now 67 bpm  Abdomen - soft, nontender, non-distended  Pelvic - deferred   Back exam - full range of motion, no tenderness or pain on motion  Neurological - alert, oriented, normal speech  Extremities - peripheral pulses normal  Skin - normal coloration and turgor, no suspicious skin lesions noted      Results for orders placed or performed during the hospital encounter of 12/26/24   Protein / Creatinine Ratio, Urine - Urine, Clean Catch    Collection Time: 12/26/24 12:07 PM    Specimen: Urine, Clean Catch   Result Value Ref Range    Creatinine, Urine 53.5 mg/dL    Total Protein, Urine 6.2 mg/dL    Protein/Creatinine Ratio, Urine 0.12    Comprehensive Metabolic Panel    Collection Time: 12/26/24 12:07 PM    Specimen: Blood   Result Value Ref Range    Glucose 91 65 - 99 mg/dL    BUN 12 6 - 20 mg/dL    Creatinine 0.53 (L) 0.57 - 1.00 mg/dL    Sodium 138 136 - 145 mmol/L    Potassium 3.8 3.5 - 5.2 mmol/L    Chloride 104 98 - 107 mmol/L    CO2 22.8 22.0 - 29.0 mmol/L    Calcium 9.6 8.6 - 10.5 mg/dL    Total Protein 6.9 6.0 - 8.5 g/dL    Albumin 3.9 3.5 - 5.2 g/dL    ALT (SGPT) 26 1 - 33 U/L    AST (SGOT) 19 1 - 32 U/L    Alkaline Phosphatase 74 39 - 117 U/L    Total Bilirubin 0.2 0.0 - 1.2 mg/dL    Globulin 3.0 gm/dL    A/G Ratio 1.3 g/dL    BUN/Creatinine Ratio 22.6 7.0 - 25.0    Anion Gap 11.2 5.0 - 15.0 mmol/L    eGFR 123.1 >60.0 mL/min/1.73   CBC (No Diff)    Collection Time: 12/26/24 12:07 PM    Specimen: Blood   Result Value Ref Range    WBC 6.61 3.40 - 10.80 10*3/mm3    RBC 3.83 3.77 - 5.28 10*6/mm3    Hemoglobin 12.5 12.0 - 15.9 g/dL    Hematocrit 37.2 34.0 - 46.6 %    MCV 97.1 (H) 79.0 - 97.0 fL    MCH 32.6 26.6 - 33.0 pg    MCHC 33.6 31.5 - 35.7 g/dL    RDW 11.8 (L) 12.3 - 15.4 %    RDW-SD 41.5 37.0 - 54.0 fl    MPV 9.8 6.0 - 12.0  fL    Platelets 345 140 - 450 10*3/mm3         Assessment & Plan       Preeclampsia in postpartum period         -11 days post op repeat  Section      Assessment:  1.  Post partum preEclampsia labs reviewed.   2.  No severe features noted  3.  Start procardia 20 mg XL daily.  Script sent to pharmacy.  3.  Obstetrical history significant for   11 days post partum .  4.  GBS status:   Group B Strep, DNA   Date Value Ref Range Status   2024 Negative Negative Final       Plan:  1.  Start Procardia 30 mg XL daily.  Script sent to pharmacy  2.  PreEclampsia signs and symptoms literature given.  3.  Plan of care has been reviewed with patient and patient agrees.   4.  Risks, benefits of treatment plan have been discussed.  5.  All questions have been answered.  6.  Follow up already scheduled in the office with Dr Torres for 25.    Evy Mccord MD        Electronically signed by Evy Mccord MD, 24, 1:06 PM EST.

## 2024-12-26 NOTE — TELEPHONE ENCOUNTER
LUCIA was able to see this patient at her appt at the hospital. She states her b/p continues to be a problem and her breast fullness is decreasing but is not worried about this. She was readmitted for a couple days for b/p issues and is not really pumping anymore.

## 2024-12-27 ENCOUNTER — HOSPITAL ENCOUNTER (OUTPATIENT)
Facility: HOSPITAL | Age: 34
Discharge: HOME | End: 2024-12-27
Attending: OBSTETRICS & GYNECOLOGY | Admitting: OBSTETRICS & GYNECOLOGY
Payer: COMMERCIAL

## 2024-12-27 ENCOUNTER — MATERNAL SCREENING (OUTPATIENT)
Dept: CALL CENTER | Facility: HOSPITAL | Age: 34
End: 2024-12-27
Payer: COMMERCIAL

## 2024-12-27 ENCOUNTER — TELEPHONE (OUTPATIENT)
Dept: OBSTETRICS AND GYNECOLOGY | Facility: CLINIC | Age: 34
End: 2024-12-27
Payer: COMMERCIAL

## 2024-12-27 VITALS — HEART RATE: 79 BPM | DIASTOLIC BLOOD PRESSURE: 75 MMHG | SYSTOLIC BLOOD PRESSURE: 129 MMHG

## 2024-12-27 PROCEDURE — G0463 HOSPITAL OUTPT CLINIC VISIT: HCPCS

## 2024-12-27 RX ORDER — NIFEDIPINE 30 MG/1
30 TABLET, EXTENDED RELEASE ORAL 2 TIMES DAILY
Qty: 60 TABLET | Refills: 0 | Status: SHIPPED | OUTPATIENT
Start: 2024-12-27

## 2024-12-27 RX ORDER — NIFEDIPINE 30 MG/1
30 TABLET, EXTENDED RELEASE ORAL
Status: DISCONTINUED | OUTPATIENT
Start: 2024-12-27 | End: 2024-12-27 | Stop reason: HOSPADM

## 2024-12-27 RX ADMIN — NIFEDIPINE 30 MG: 30 TABLET, FILM COATED, EXTENDED RELEASE ORAL at 14:10

## 2024-12-27 NOTE — OUTREACH NOTE
Maternal Screening Survey      Flowsheet Row Responses   Facility patient discharged from? [facility]   Attempt successful? Yes   Call start time 1437   Call end time 1438   I have been able to laugh and see the funny side of things. 0   I have looked forward with enjoyment to things. 0   I have blamed myself unnecessarily when things went wrong. 1   I have been anxious or worried for no good reason. 2   I have felt scared or panicky for no good reason. 1   Things have been getting on top of me. 1   I have been so unhappy that I have had difficulty sleeping. 0   I have felt sad or miserable. 0   I have been so unhappy that I have been crying. 0   The thought of harming myself has occurred to me. 0   [place]  Depression Scale Total 5   Did any of your parents have problems with alcohol or drug use? No   Do any of your peers have problems with alcohol or drug use? No   Does your partner have problems with alcohol or drug use? No   Before you were pregnant did you have problems with alcohol or drug use? (past) No   In the past month, did you drink beer, wine, liquor or use any other drugs? (pregnancy) No   Maternal Screening call completed Yes   Call end time 1438              DALTON SPANN - Registered Nurse

## 2024-12-27 NOTE — TELEPHONE ENCOUNTER
Patient called states she was started on Procardia XL 30 mg yesterday. Her BP starting to increase so she called in and spoke to Dr. Saini. She was advised to call the office if her systolic was above 160 or the diastolic was above 110. She took her BP this morning at 9:15 and it was 161/96. She didn't know what she needed to do now. Please advise.

## 2024-12-27 NOTE — OBED NOTES
LESLYE Jackson  Obstetric History and Physical    Chief Complaint   Patient presents with    Elevated Blood Pressure     PP pt elevated BP at home       Subjective     PNC provided by:  [facility] [place]    HPI:    Patient is a 34 y.o. female  currently at 39w5d, who presents with BP elevated at home;  pt has been admitted PP and received magnesium;  pt has been placed on labetalol but had bradycardia;  pt returned with elevated BP and placed on procardia XL 30mg;  pt took last night at approx 1930;  this am at 1000, BP was 160/90 and was told to return here;  pt not feeling bad, just has an area of oozing in middle of inc.    Her prenatal care is complicated by  Hypertension PP preE .  Her previous obstetric/gynecological history is noted for is remarkable for 2 CS, 1 SAB    The following portions of the patients history were reviewed and updated as appropriate:   current medications, allergies, past medical history, past surgical history, past family history, past social history and current problem list.     Prenatal Information:  Prenatal Results       Initial Prenatal Labs       Test Value Reference Range Date Time    Hemoglobin  12.1 g/dL 12.0 - 15.9 24 1527       12.8 g/dL 12.0 - 15.9 24 0932    Hematocrit  35.9 % 34.0 - 46.6 24 1527       37.7 % 34.0 - 46.6 24 0932    Platelets  279 10*3/mm3 140 - 450 24 1527       330 10*3/mm3 140 - 450 24 0932    Rubella IgG  1.05 index Immune >0.99 24 1527    Hepatitis B SAg  Non-Reactive  Non-Reactive 24 1527    Hepatitis C Ab  Non-Reactive  Non-Reactive 24 1527    RPR  Non Reactive  Non Reactive 24 1603    T. Pallidum Ab   Non-Reactive  Non-Reactive 12/15/24 0803       Non-Reactive  Non-Reactive 24 1527    ABO  A   12/15/24 0803    Rh  Positive   12/15/24 0803    Antibody Screen  Negative   24 1527    HIV  Non-Reactive  Non-Reactive 24 1527    Urine Culture  No growth   24 1537    Gonorrhea   Negative  Negative 06/27/24 1537    Chlamydia  Negative  Negative 06/27/24 1537    TSH        HgB A1c   5.50 % 4.80 - 5.60 06/27/24 1527       5.60 % 4.80 - 5.60 04/25/24 0932    Varicella IgG        Hemoglobinopathy Fractionation  Comment   06/27/24 1527    Hemoglobinopathy (genetic testing)        Cystic fibrosis         Spinal muscular atrophy        Fragile X                  Fetal testing        Test Value Reference Range Date Time    NIPT        MSAFP        AFP-4                  2nd and 3rd Trimester       Test Value Reference Range Date Time    Hemoglobin (repeated)  12.5 g/dL 12.0 - 15.9 12/26/24 1207       11.4 g/dL 12.0 - 15.9 12/22/24 0055       11.2 g/dL 12.0 - 15.9 12/20/24 2211       11.0 g/dL 12.0 - 15.9 12/16/24 0547       12.6 g/dL 12.0 - 15.9 12/15/24 0803       12.5 g/dL 12.0 - 15.9 12/05/24 1603       10.9 g/dL 12.0 - 15.9 08/27/24 1603    Hematocrit (repeated)  37.2 % 34.0 - 46.6 12/26/24 1207       33.8 % 34.0 - 46.6 12/22/24 0055       33.8 % 34.0 - 46.6 12/20/24 2211       34.1 % 34.0 - 46.6 12/16/24 0547       37.4 % 34.0 - 46.6 12/15/24 0803       36.0 % 34.0 - 46.6 12/05/24 1603       32.1 % 34.0 - 46.6 08/27/24 1603    Platelets   345 10*3/mm3 140 - 450 12/26/24 1207       284 10*3/mm3 140 - 450 12/22/24 0055       270 10*3/mm3 140 - 450 12/20/24 2211       219 10*3/mm3 140 - 450 12/16/24 0547       246 10*3/mm3 140 - 450 12/15/24 0803       260 10*3/mm3 140 - 450 12/05/24 1603       251 10*3/mm3 140 - 450 08/27/24 1603       279 10*3/mm3 140 - 450 06/27/24 1527       330 10*3/mm3 140 - 450 04/25/24 0932    1 hour GTT   190 mg/dL 65 - 139 08/27/24 1603    Antibody Screen (repeated)  Negative   12/15/24 0803    3rd TM syphilis scrn (repeated)  RPR         3rd TM syphilis scrn (repeated) TP-Ab  Non-Reactive  Non-Reactive 12/15/24 0803    3rd TM syphilis screen TB-Ab (FTA)  Non-Reactive  Non-Reactive 12/15/24 0803    Syphilis cascade test TP-Ab (EIA)        Syphilis cascade TPPA        GTT  Fasting        GTT 1 Hr        GTT 2 Hr        GTT 3 Hr        Group B Strep  Negative  Negative 11/20/24 1636              Other testing        Test Value Reference Range Date Time    Parvo IgG         CMV IgG                   Drug Screening       Test Value Reference Range Date Time    Amphetamine Screen  Negative  Negative 12/15/24 0803    Barbiturate Screen  Negative  Negative 12/15/24 0803       Negative  Negative 06/27/24 1537    Benzodiazepine Screen  Negative  Negative 12/15/24 0803       Negative  Negative 06/27/24 1537    Methadone Screen  Negative  Negative 12/15/24 0803       Negative  Negative 06/27/24 1537    Phencyclidine Screen  Negative  Negative 12/15/24 0803    Opiates Screen  Negative  Negative 12/15/24 0803       Negative  Negative 06/27/24 1537    THC Screen  Negative  Negative 12/15/24 0803       Negative  Negative 06/27/24 1537    Cocaine Screen  Negative  Negative 12/15/24 0803       Negative  Negative 06/27/24 1537    Propoxyphene Screen        Buprenorphine Screen  Negative  Negative 12/15/24 0803    Methamphetamine Screen  Negative  Negative 12/15/24 0803    Oxycodone Screen  Negative  Negative 12/15/24 0803       Negative  Negative 06/27/24 1537    Tricyclic Antidepressants Screen  Negative  Negative 12/15/24 0803              Legend    ^: Historical                          External Prenatal Results       Pregnancy Outside Results - Transcribed From Office Records - See Scanned Records For Details       Test Value Date Time    ABO  A  12/15/24 0803    Rh  Positive  12/15/24 0803    Antibody Screen  Negative  12/15/24 0803       Negative  06/27/24 1527    Varicella IgG       Rubella  1.05 index 06/27/24 1527    Hgb  12.5 g/dL 12/26/24 1207       11.4 g/dL 12/22/24 0055       11.2 g/dL 12/20/24 2211       11.0 g/dL 12/16/24 0547       12.6 g/dL 12/15/24 0803       12.5 g/dL 12/05/24 1603       10.9 g/dL 08/27/24 1603       12.1 g/dL 06/27/24 1527       12.8 g/dL 04/25/24 0932    Hct   37.2 % 12/26/24 1207       33.8 % 12/22/24 0055       33.8 % 12/20/24 2211       34.1 % 12/16/24 0547       37.4 % 12/15/24 0803       36.0 % 12/05/24 1603       32.1 % 08/27/24 1603       35.9 % 06/27/24 1527       37.7 % 04/25/24 0932    HgB A1c   5.50 % 06/27/24 1527       5.60 % 04/25/24 0932    1h GTT  190 mg/dL 08/27/24 1603    3h GTT Fasting       3h GTT 1 hour       3h GTT 2 hour       3h GTT 3 hour        Gonorrhea (discrete)  Negative  06/27/24 1537    Chlamydia (discrete)  Negative  06/27/24 1537    RPR  Non Reactive  08/27/24 1603    Syphils cascade: TP-Ab (FTA)  Non-Reactive  12/15/24 0803       Non-Reactive  06/27/24 1527    TP-Ab  Non-Reactive  12/15/24 0803       Non-Reactive  06/27/24 1527    TP-Ab (EIA)       TPPA       HBsAg  Non-Reactive  06/27/24 1527    Herpes Simplex Virus PCR       Herpes Simplex VIrus Culture       HIV  Non-Reactive  06/27/24 1527    Hep C RNA Quant PCR       Hep C Antibody  Non-Reactive  06/27/24 1527    AFP       NIPT       Cystic Fibrosis ([place])       Cystic Fibroisis        Spinal Muscular atrophy       Fragile X       Group B Strep  Negative  11/20/24 1636    GBS Susceptibility to Clindamycin       GBS Susceptibility to Erythromycin       Fetal Fibronectin       Genetic Testing, Maternal Blood                 Drug Screening       Test Value Date Time    Urine Drug Screen       Amphetamine Screen  Negative  12/15/24 0803    Barbiturate Screen  Negative  12/15/24 0803       Negative  06/27/24 1537    Benzodiazepine Screen  Negative  12/15/24 0803       Negative  06/27/24 1537    Methadone Screen  Negative  12/15/24 0803       Negative  06/27/24 1537    Phencyclidine Screen  Negative  12/15/24 0803    Opiates Screen  Negative  12/15/24 0803       Negative  06/27/24 1537    THC Screen  Negative  12/15/24 0803       Negative  06/27/24 1537    Cocaine Screen       Propoxyphene Screen       Buprenorphine Screen  Negative  12/15/24 0803    Methamphetamine Screen        Oxycodone Screen  Negative  12/15/24 0803       Negative  24 1537    Tricyclic Antidepressants Screen  Negative  12/15/24 0803              Legend    ^: Historical                             Problem List:     Patient Active Problem List   Diagnosis    Palpitations    Tachycardia    Obesity (BMI 30-39.9)    Supervision of other normal pregnancy, antepartum    H/O  section    Hx of gestational diabetes in prior pregnancy, currently pregnant    AMA (advanced maternal age) multigravida 35+    Antepartum placenta circumvallata    Diet controlled gestational diabetes mellitus (GDM), antepartum    Varicose veins of left lower extremity with other complications    Normal pregnancy    Elevated blood-pressure reading without diagnosis of hypertension    Preeclampsia in postpartum period        Past OB History:     OB History    Para Term  AB Living   3 2 2 0 1 2   SAB IAB Ectopic Molar Multiple Live Births   0 0 0 0 0 2      # Outcome Date GA Lbr Phu/2nd Weight Sex Type Anes PTL Lv   3 Term 12/15/24 39w5d  3860 g (8 lb 8.2 oz) F CS-LTranv Spinal N SHAYY      Name: Omar Juan      Apgar1: 9  Apgar5: 9   2 Term     M CS-Unspec   SHAYY   1 AB                Past Medical History: Past Medical History:   Diagnosis Date    Abnormal ECG     Anemia 2012    Anxiety     Arrhythmia     Ectopic pregnancy 2009    Gestational diabetes 2012    Heart palpitations     Hyperlipidemia     Migraine     PMS (premenstrual syndrome)     Polycystic ovary syndrome     Urinary tract infection     Varicella       Past Surgical History Past Surgical History:   Procedure Laterality Date    ANKLE SURGERY Left      SECTION       SECTION WITH TUBAL N/A 12/15/2024    Procedure:  SECTION REPEAT WITH TUBAL;  Surgeon: Kristal Torres DO;  Location: [facility] LABOR DELIVERY;  Service: Gynecology;  Laterality: N/A;    WISDOM TOOTH EXTRACTION        Family History: Family History   Problem Relation Age of  Onset    Mental illness Father     Drug abuse Father     Alcohol abuse Father     Depression Father     Anxiety disorder Father     Hyperlipidemia Father     Hypertension Father     Diabetes Father     Liver disease Mother     Heart disease Mother     Depression Mother     Anxiety disorder Mother     Hyperlipidemia Mother     Hypertension Mother     Diabetes Mother     Alcohol abuse Mother     Drug abuse Mother     Breast cancer Sister     Mental illness Sister     Thyroid disease Maternal Aunt     Breast cancer Maternal Aunt     Hyperlipidemia Maternal Aunt     Hypertension Maternal Aunt     Diabetes Maternal Aunt     Hyperlipidemia Maternal Aunt     Hypertension Maternal Aunt     Diabetes Maternal Aunt     Hypertension Sister     Ovarian cancer Neg Hx     Uterine cancer Neg Hx     Colon cancer Neg Hx     Deep vein thrombosis Neg Hx     Pulmonary embolism Neg Hx       Social History:  reports that she quit smoking about 3 years ago. Her smoking use included cigarettes. She started smoking about 19 years ago. She has a 15 pack-year smoking history. She has never used smokeless tobacco.   reports that she does not currently use alcohol.   reports no history of drug use.        General ROS: Pertinent items are noted in HPI        Home Medications:  Enoxaparin Sodium, NIFEdipine XL, cholecalciferol, ibuprofen, prenatal vitamin, and vitamin B-12    Allergies:  Allergies   Allergen Reactions    Erythromycin Hives       Objective       Vital Signs Range for the last 24 hours:  AFVSS  Temperature:     Temp Source:     BP:  149/82, 135/82   Pulse:     Respirations:     SPO2:       Physical Examination:   General appearance - alert, well appearing, and in no distress  Mental status - alert, oriented to person, place, and time  Chest - clear to auscultation  Heart - normal rate, regular rhythm,  Abdomen - soft, nontender, nondistended,   Inc:  c/d/I, no E/E;  sm amt drainage in middle, could not express any fluid  Back exam -  full range of motion, no tenderness or pain on motion  Neurological - alert, oriented, normal speech  Extremities - peripheral pulses normal  Skin - normal coloration and turgor, no suspicious skin lesions noted         Assessment & Plan     A/P:    PP preE - do not mag again PP and BP here not severe;  will inc procardia XL to 30mg bid as suspect just wearing off before next dose              Electronically signed by Lexis Donaldson MD, 12/27/24, 2:06 PM EST.

## 2024-12-30 NOTE — PROGRESS NOTES
POSTPARTUM Follow Up Visit      Chief Complaint   Patient presents with    Wound Check     HPI:      Date of delivery: 12/15/2024  Delivery type:   LTCS, Bilateral salpingectomy          Perineum : Intact  Delivering Provider:   Dr. Kristal Torres      Feeding: Bottle  Pain:  No  Vaginal Bleeding:  Yes  Depressed/Anxious:  No  EPDS score: 2   #10: 0  Plans for BC:  Sterilization/tubal PERFORMED AT CS    Last pap date and result:   Last Completed Pap Smear       This patient has no relevant Health Maintenance data.                 Postpartum Depression: Low Risk  (2025)    [place]  Depression Scale     Last EPDS Total Score: 2     Last EPDS Self Harm Result: Never   Recent Concern: Postpartum Depression - Medium Risk (2024)    [place]  Depression Scale     Last EPDS Total Score: 5     Last EPDS Self Harm Result: Never                     PHYSICAL EXAM:  /88   Pulse 106   Wt 78.9 kg (174 lb)   LMP 2024 (Exact Date)   Breastfeeding No   BMI 27.25 kg/m²  5.897 kg (13 lb)  General- NAD, alert and oriented, appropriate  Psych- Normal mood, good memory, good eye contact    INCISION : Clean, dry, intact, no evidence of infection  Abdomen- Soft, non distended, non tender, no masses  Ext- No edema    ASSESSMENT AND PLAN:  Diagnoses and all orders for this visit:    1. Postpartum follow-up (Primary)    2. Preeclampsia in postpartum period    Continue Procardia  Monitor Bps at home    Counseling:    May resume intercourse once 4 weeks postpartum  Ok to return to work/school once patient desires/maternity leave completed  PreE precautions        Follow Up:  Return in about 4 weeks (around 2025) for Postpartum.          Kristal Torres DO  2025    [facility]  [facility] [place] [address]  Dept: [phone]  Dept Fax: [phone]  Loc: [phone]  Loc Fax: [phone]

## 2025-01-02 ENCOUNTER — POSTPARTUM VISIT (OUTPATIENT)
Dept: OBSTETRICS AND GYNECOLOGY | Facility: CLINIC | Age: 35
End: 2025-01-02
Payer: COMMERCIAL

## 2025-01-02 VITALS
HEART RATE: 106 BPM | DIASTOLIC BLOOD PRESSURE: 88 MMHG | SYSTOLIC BLOOD PRESSURE: 137 MMHG | BODY MASS INDEX: 27.25 KG/M2 | WEIGHT: 174 LBS

## 2025-01-07 ENCOUNTER — TELEPHONE (OUTPATIENT)
Dept: OBSTETRICS AND GYNECOLOGY | Facility: CLINIC | Age: 35
End: 2025-01-07
Payer: COMMERCIAL

## 2025-01-07 NOTE — TELEPHONE ENCOUNTER
Patient called in stating that she was prescribed procardia 30mg and it is causing her to have high rates and causing her to be dizzy, what do you recommend? She is not having any low BP just causing heart rate issues.

## 2025-01-09 ENCOUNTER — OFFICE VISIT (OUTPATIENT)
Dept: FAMILY MEDICINE CLINIC | Facility: CLINIC | Age: 35
End: 2025-01-09
Payer: COMMERCIAL

## 2025-01-09 VITALS
DIASTOLIC BLOOD PRESSURE: 77 MMHG | BODY MASS INDEX: 29.66 KG/M2 | TEMPERATURE: 97.2 F | HEART RATE: 102 BPM | HEIGHT: 67 IN | OXYGEN SATURATION: 100 % | SYSTOLIC BLOOD PRESSURE: 117 MMHG | WEIGHT: 189 LBS

## 2025-01-09 DIAGNOSIS — J02.0 STREP PHARYNGITIS: Primary | ICD-10-CM

## 2025-01-09 DIAGNOSIS — J02.9 SORE THROAT: ICD-10-CM

## 2025-01-09 DIAGNOSIS — R50.9 FEVER, UNSPECIFIED FEVER CAUSE: ICD-10-CM

## 2025-01-09 LAB
EXPIRATION DATE: ABNORMAL
EXPIRATION DATE: NORMAL
FLUAV AG UPPER RESP QL IA.RAPID: NOT DETECTED
FLUBV AG UPPER RESP QL IA.RAPID: NOT DETECTED
INTERNAL CONTROL: ABNORMAL
INTERNAL CONTROL: NORMAL
Lab: ABNORMAL
Lab: NORMAL
S PYO AG THROAT QL: POSITIVE
SARS-COV-2 AG UPPER RESP QL IA.RAPID: NOT DETECTED

## 2025-01-09 PROCEDURE — 99214 OFFICE O/P EST MOD 30 MIN: CPT

## 2025-01-09 PROCEDURE — 87428 SARSCOV & INF VIR A&B AG IA: CPT

## 2025-01-09 PROCEDURE — 87880 STREP A ASSAY W/OPTIC: CPT

## 2025-01-09 PROCEDURE — 1125F AMNT PAIN NOTED PAIN PRSNT: CPT

## 2025-01-09 NOTE — PROGRESS NOTES
Chief Complaint     Sore Throat and Fever    History of Present Illness     Yarely Newman is a 34 y.o. female who presents to [facility] for evaluation of sore throat and fever.      She was tested for strep, covid, and flu and was positive for strep.      History      Past Medical History:   Diagnosis Date    Abnormal ECG     Anemia     Anxiety     Arrhythmia     Ectopic pregnancy 2009    Gestational diabetes     Heart palpitations     Hyperlipidemia     Migraine     PMS (premenstrual syndrome)     Polycystic ovary syndrome     Urinary tract infection     Varicella        Past Surgical History:   Procedure Laterality Date    ANKLE SURGERY Left      SECTION       SECTION WITH TUBAL N/A 12/15/2024    Procedure:  SECTION REPEAT WITH TUBAL;  Surgeon: Kristal Torres DO;  Location: [facility] LABOR DELIVERY;  Service: Gynecology;  Laterality: N/A;    WISDOM TOOTH EXTRACTION         Family History   Problem Relation Age of Onset    Mental illness Father     Drug abuse Father     Alcohol abuse Father     Depression Father     Anxiety disorder Father     Hyperlipidemia Father     Hypertension Father     Diabetes Father     Liver disease Mother     Heart disease Mother     Depression Mother     Anxiety disorder Mother     Hyperlipidemia Mother     Hypertension Mother     Diabetes Mother     Alcohol abuse Mother     Drug abuse Mother     Breast cancer Sister     Mental illness Sister     Thyroid disease Maternal Aunt     Breast cancer Maternal Aunt     Hyperlipidemia Maternal Aunt     Hypertension Maternal Aunt     Diabetes Maternal Aunt     Hyperlipidemia Maternal Aunt     Hypertension Maternal Aunt     Diabetes Maternal Aunt     Hypertension Sister     Ovarian cancer Neg Hx     Uterine cancer Neg Hx     Colon cancer Neg Hx     Deep vein thrombosis Neg Hx     Pulmonary embolism Neg Hx         Current Medications        Current Outpatient Medications:      "cholecalciferol (Vitamin D) 25 MCG (1000 UT) tablet, Take 1 tablet by mouth 2 (Two) Times a Day., Disp: , Rfl:     ibuprofen (ADVIL,MOTRIN) 800 MG tablet, Take 1 tablet by mouth Every 8 (Eight) Hours As Needed for Mild Pain (alternate with tylenol)., Disp: 30 tablet, Rfl: 1    NIFEdipine XL (Procardia XL) 30 MG 24 hr tablet, Take 1 tablet by mouth 2 (Two) Times a Day., Disp: 60 tablet, Rfl: 0    prenatal vitamin (prenatal, CLASSIC, vitamin) tablet, Take  by mouth Daily., Disp: , Rfl:     vitamin B-12 (CYANOCOBALAMIN) 500 MCG tablet, Take 1 tablet by mouth Daily., Disp: , Rfl:     amoxicillin-clavulanate (AUGMENTIN) 875-125 MG per tablet, Take 1 tablet by mouth 2 (Two) Times a Day for 10 days., Disp: 20 tablet, Rfl: 0     Allergies     Allergies   Allergen Reactions    Erythromycin Hives       Social History       Social History     Social History Narrative    Not on file       Immunizations     Immunization:  Immunization History   Administered Date(s) Administered    ABRYSVO (RSV, 60+ or pregnant women 32-36 wks) 11/20/2024    COVID-19 (MODERNA) 1st,2nd,3rd Dose Monovalent 08/06/2021, 09/15/2021    Fluzone  >6mos 11/06/2024    Fluzone Quad >6mos (Multi-dose) 10/08/2019    Tdap 11/19/2024          Objective     Objective     Vital Signs:   /77 (BP Location: Left arm, Patient Position: Sitting, Cuff Size: Adult)   Pulse 102   Temp 97.2 °F (36.2 °C) (Temporal)   Ht 170.2 cm (67\")   Wt 85.7 kg (189 lb)   SpO2 100%   BMI 29.60 kg/m²       Physical Exam  Constitutional:       Appearance: Normal appearance.   HENT:      Nose: Nose normal.      Mouth/Throat:      Mouth: Mucous membranes are moist.      Pharynx: Oropharyngeal exudate and posterior oropharyngeal erythema present.   Cardiovascular:      Rate and Rhythm: Normal rate and regular rhythm.      Pulses: Normal pulses.      Heart sounds: Normal heart sounds.   Pulmonary:      Effort: Pulmonary effort is normal.      Breath sounds: Normal breath sounds. "   Skin:     General: Skin is warm and dry.   Neurological:      General: No focal deficit present.      Mental Status: She is alert and oriented to person, place, and time.   Psychiatric:         Mood and Affect: Mood normal.         Behavior: Behavior normal.         Results    The following data was reviewed by: LAVONNE Vinson on 01/09/2025:             Assessment and Plan        Assessment and Plan    Diagnoses and all orders for this visit:    1. Strep pharyngitis (Primary)  -     amoxicillin-clavulanate (AUGMENTIN) 875-125 MG per tablet; Take 1 tablet by mouth 2 (Two) Times a Day for 10 days.  Dispense: 20 tablet; Refill: 0    2. Sore throat  -     POCT SARS-CoV-2 Antigen LINN + Flu  -     POCT rapid strep A    3. Fever, unspecified fever cause  -     POCT SARS-CoV-2 Antigen LINN + Flu  -     POCT rapid strep A            I spent 30 minutes caring for Yarely on this date of service. This time includes time spent by me in the following activities:preparing for the visit, reviewing tests, performing a medically appropriate examination and/or evaluation , counseling and educating the patient/family/caregiver, ordering medications, tests, or procedures, and documenting information in the medical record  Follow Up        Follow Up   Return if symptoms worsen or fail to improve.  Patient was given instructions and counseling regarding her condition or for health maintenance advice. Please see specific information pulled into the AVS if appropriate.    Answers submitted by the patient for this visit:  Primary Reason for Visit (Submitted on 1/9/2025)  What is the primary reason for your visit?: Sore Throat  Sore Throat Questionnaire (Submitted on 1/9/2025)  Chief Complaint: Sore throat  Chronicity: new  Onset: in the past 7 days  Pain worse on: both  Fever: 100 - 101 F  Fever duration: 1 to 2 days  Pain - numeric: 7/10  abdominal pain: No  congestion: No  cough: No  diarrhea: No  drooling: No  drainage: No  ear  pain: No  headaches: Yes  hoarse voice: No  neck pain: Yes  shortness of breath: No  swollen glands: Yes  trouble swallowing: Yes  vomiting: No

## 2025-01-30 ENCOUNTER — POSTPARTUM VISIT (OUTPATIENT)
Dept: OBSTETRICS AND GYNECOLOGY | Facility: CLINIC | Age: 35
End: 2025-01-30
Payer: COMMERCIAL

## 2025-01-30 VITALS
HEART RATE: 88 BPM | WEIGHT: 190 LBS | DIASTOLIC BLOOD PRESSURE: 77 MMHG | SYSTOLIC BLOOD PRESSURE: 114 MMHG | BODY MASS INDEX: 29.76 KG/M2

## 2025-06-04 ENCOUNTER — OFFICE VISIT (OUTPATIENT)
Dept: FAMILY MEDICINE CLINIC | Facility: CLINIC | Age: 35
End: 2025-06-04
Payer: COMMERCIAL

## 2025-06-04 VITALS
HEART RATE: 87 BPM | HEIGHT: 67 IN | RESPIRATION RATE: 17 BRPM | DIASTOLIC BLOOD PRESSURE: 74 MMHG | WEIGHT: 203 LBS | SYSTOLIC BLOOD PRESSURE: 120 MMHG | OXYGEN SATURATION: 99 % | BODY MASS INDEX: 31.86 KG/M2 | TEMPERATURE: 98 F

## 2025-06-04 DIAGNOSIS — E55.9 VITAMIN D DEFICIENCY: ICD-10-CM

## 2025-06-04 DIAGNOSIS — Z13.29 SCREENING FOR THYROID DISORDER: Primary | ICD-10-CM

## 2025-06-04 DIAGNOSIS — N92.0 MENORRHAGIA WITH REGULAR CYCLE: ICD-10-CM

## 2025-06-04 DIAGNOSIS — Z13.220 SCREENING FOR LIPID DISORDERS: ICD-10-CM

## 2025-06-04 DIAGNOSIS — E66.9 OBESITY (BMI 30-39.9): ICD-10-CM

## 2025-06-04 DIAGNOSIS — D50.9 IRON DEFICIENCY ANEMIA, UNSPECIFIED IRON DEFICIENCY ANEMIA TYPE: ICD-10-CM

## 2025-06-04 DIAGNOSIS — Z00.00 ANNUAL PHYSICAL EXAM: ICD-10-CM

## 2025-06-04 RX ORDER — MULTIPLE VITAMINS W/ MINERALS TAB 9MG-400MCG
1 TAB ORAL DAILY
COMMUNITY

## 2025-06-05 PROBLEM — Z34.90 NORMAL PREGNANCY: Status: RESOLVED | Noted: 2024-12-15 | Resolved: 2025-06-05

## 2025-06-05 PROBLEM — N92.0 MENORRHAGIA WITH REGULAR CYCLE: Status: ACTIVE | Noted: 2025-06-05

## 2025-06-05 PROBLEM — Z00.00 ANNUAL PHYSICAL EXAM: Status: ACTIVE | Noted: 2025-06-05
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
## 2025-06-05 NOTE — ASSESSMENT & PLAN NOTE
Patient's (Body mass index is 31.79 kg/m².) indicates that they are obese (BMI >30) with health conditions that include none . Weight is worsening. BMI  is above average; BMI management plan is completed. We discussed portion control and increasing exercise.

## 2025-06-05 NOTE — PROGRESS NOTES
Chief Complaint     Annual Exam and Menstrual Problem (Pt states that she had a baby 6 months ago and her menstrual period has been brutal she states that she cannot leave the house. The first 3 days she goes through a super tampon within a hour and half dollar blood clot. )    History of Present Illness     Yarely Newman is a 35 y.o. female who presents to [facility] for evaluation of .          History of Present Illness  The patient is a 35-year-old female who presents for a physical exam and lab work.    She reports experiencing menorrhagia and dysmenorrhea, which began after her recent childbirth on 12/15/2024. She is not currently breastfeeding due to a history of postpartum preeclampsia, which has since resolved. Her menstrual cycles are regular but characterized by heavy bleeding, necessitating frequent bathroom visits and hourly tampon changes during the initial 3 to 4 days of her period. She underwent a bilateral tubal ligation procedure.    She has requested additional laboratory tests to assess her vitamin D and B12 levels, as she has a known history of chronic deficiencies in these vitamins.    GYNECOLOGICAL HISTORY:  - Menstrual Pain: Present    PAST SURGICAL HISTORY:  - Bilateral tubal ligation          History      Past Medical History:   Diagnosis Date    Abnormal ECG     Anemia 2012    Anxiety     Arrhythmia     Ectopic pregnancy 2009    Gestational diabetes 2012    Heart palpitations     Hyperlipidemia     Migraine     PMS (premenstrual syndrome)     Polycystic ovary syndrome     Urinary tract infection     Varicella        Past Surgical History:   Procedure Laterality Date    ANKLE SURGERY Left      SECTION       SECTION WITH TUBAL N/A 12/15/2024    Procedure:  SECTION REPEAT WITH TUBAL;  Surgeon: Kristal Torres DO;  Location: [facility];  Service: Gynecology;  Laterality: N/A;    WISDOM TOOTH EXTRACTION         Family History    Problem Relation Age of Onset    Mental illness Father     Drug abuse Father     Alcohol abuse Father     Depression Father     Anxiety disorder Father     Hyperlipidemia Father     Hypertension Father     Diabetes Father     Liver disease Mother     Heart disease Mother     Depression Mother     Anxiety disorder Mother     Hyperlipidemia Mother     Hypertension Mother     Diabetes Mother     Alcohol abuse Mother     Drug abuse Mother     Breast cancer Sister     Mental illness Sister     Thyroid disease Maternal Aunt     Breast cancer Maternal Aunt     Hyperlipidemia Maternal Aunt     Hypertension Maternal Aunt     Diabetes Maternal Aunt     Hyperlipidemia Maternal Aunt     Hypertension Maternal Aunt     Diabetes Maternal Aunt     Hypertension Sister     Ovarian cancer Neg Hx     Uterine cancer Neg Hx     Colon cancer Neg Hx     Deep vein thrombosis Neg Hx     Pulmonary embolism Neg Hx         Current Medications        Current Outpatient Medications:     cholecalciferol (Vitamin D) 25 MCG (1000 UT) tablet, Take 1 tablet by mouth 2 (Two) Times a Day., Disp: , Rfl:     ibuprofen (ADVIL,MOTRIN) 800 MG tablet, Take 1 tablet by mouth Every 8 (Eight) Hours As Needed for Mild Pain (alternate with tylenol)., Disp: 30 tablet, Rfl: 1    multivitamin with minerals tablet tablet, Take 1 tablet by mouth Daily., Disp: , Rfl:     vitamin B-12 (CYANOCOBALAMIN) 500 MCG tablet, Take 1 tablet by mouth Daily., Disp: , Rfl:      Allergies     Allergies   Allergen Reactions    Erythromycin Hives       Social History       Social History     Social History Narrative    Not on file       Immunizations     Immunization:  Immunization History   Administered Date(s) Administered    ABRYSVO (RSV, 60+ or pregnant women 32-36 wks) 11/20/2024    COVID-19 (MODERNA) 1st,2nd,3rd Dose Monovalent 08/06/2021, 09/15/2021    Fluzone  >6mos 11/06/2024    Fluzone Quad >6mos (Multi-dose) 10/08/2019    Tdap 11/19/2024          Objective     Objective  "    Vital Signs:   /74   Pulse 87   Temp 98 °F (36.7 °C) (Temporal)   Resp 17   Ht 170.2 cm (67.01\")   Wt 92.1 kg (203 lb)   SpO2 99%   BMI 31.79 kg/m²       Physical Exam  Vitals reviewed.   Constitutional:       General: She is not in acute distress.  HENT:      Head: Normocephalic.      Right Ear: Tympanic membrane normal.      Left Ear: Tympanic membrane normal.      Nose: Nose normal.      Mouth/Throat:      Pharynx: Oropharynx is clear. No posterior oropharyngeal erythema.   Eyes:      General: No scleral icterus.     Extraocular Movements: Extraocular movements intact.      Conjunctiva/sclera: Conjunctivae normal.      Pupils: Pupils are equal, round, and reactive to light.   Cardiovascular:      Rate and Rhythm: Normal rate and regular rhythm.      Pulses: Normal pulses.      Heart sounds: Normal heart sounds.   Pulmonary:      Effort: Pulmonary effort is normal.      Breath sounds: Normal breath sounds.   Abdominal:      General: Bowel sounds are normal.      Palpations: Abdomen is soft.   Musculoskeletal:         General: Normal range of motion.      Cervical back: Neck supple.   Skin:     General: Skin is warm and dry.   Neurological:      Mental Status: She is alert and oriented to person, place, and time.   Psychiatric:         Mood and Affect: Mood normal.         Behavior: Behavior normal.         Thought Content: Thought content normal.         Judgment: Judgment normal.         Physical Exam        Results      Result Review :   The following data was reviewed by: LAVONNE Light on 06/04/2025:  Common labs          12/20/2024    22:11 12/22/2024    00:55 12/26/2024    12:07   Common Labs   Glucose 94  96  91    BUN 14  10  12    Creatinine 0.42  0.46  0.53    Sodium 139  138  138    Potassium 3.9  3.5  3.8    Chloride 104  105  104    Calcium 9.2  8.9  9.6    Albumin 3.5  3.6  3.9    Total Bilirubin 0.2  0.2  0.2    Alkaline Phosphatase 81  78  74    AST (SGOT) 23  23  19  "   ALT (SGPT) 20  29  26    WBC 9.03  8.98  6.61    Hemoglobin 11.2  11.4  12.5    Hematocrit 33.8  33.8  37.2    Platelets 270  284  345      Consultant notes ob/gyn    Results           Assessment and Plan        Assessment and Plan    Diagnoses and all orders for this visit:    1. Screening for thyroid disorder (Primary)  -     TSH; Future    2. Screening for lipid disorders  -     Comprehensive Metabolic Panel; Future  -     CBC & Differential; Future  -     Lipid Panel; Future    3. Menorrhagia with regular cycle  Assessment & Plan:  - Reports heavy and painful periods since delivery on 12/15/2024, requiring hourly tampon changes for the first 3-4 days.  - Pelvic ultrasound ordered to rule out fibroids or cysts.  - Advised to consult with OB/GYN, Dr. Marli Torres, at [facility] for further evaluation and management options, including birth control pills, an IUD, or an ablation procedure.  - Labs ordered to check iron levels and ferritin due to heavy bleeding.    Orders:  -     Iron and TIBC; Future  -     Ferritin; Future  -     US Pelvis Transvaginal Non OB; Future    4. Iron deficiency anemia, unspecified iron deficiency anemia type  -     Vitamin B12 & Folate; Future    5. Vitamin D deficiency  -     Vitamin D 25 hydroxy; Future    6. Annual physical exam  Assessment & Plan:  Discussed age-appropriate preventative counseling including all recommended screenings and immunizations, dental health, daily sunscreen, and seatbelt use.. Discussed issues common to age group and preventive medicine services, as well as any needed anticipatory guidance. Written information provided to patient. All questions answered. Pt verbalized understanding.         7. Obesity (BMI 30-39.9)  Assessment & Plan:  Patient's (Body mass index is 31.79 kg/m².) indicates that they are obese (BMI >30) with health conditions that include none . Weight is worsening. BMI  is above average; BMI management plan is completed. We discussed  portion control and increasing exercise.           Assessment & Plan  1. Menorrhagia.      2. Anemia.  - Concern for potential anemia due to heavy menstrual bleeding.  - Laboratory tests ordered today to assess iron levels and ferritin.  - Monitoring for signs of anemia through lab results.    3. Vitamin D deficiency.  - History of chronic low vitamin D levels; patient has been taking supplements.  - Vitamin D level test ordered today to monitor status.  - Monitoring response to vitamin D supplementation through lab results.    4. Vitamin B12 deficiency.  - History of chronic low vitamin B12 levels; patient has been taking supplements.  - Vitamin B12 level test ordered today to monitor status.  - Monitoring response to vitamin B12 supplementation through lab results.    5. Health maintenance.  - Comprehensive laboratory tests ordered today, including kidney function, liver function, thyroid, and cholesterol levels.  - Monitoring overall health status through lab results.  - Ensuring all necessary health maintenance labs are conducted.        Follow Up        Follow Up   No follow-ups on file.  Patient was given instructions and counseling regarding her condition or for health maintenance advice. Please see specific information pulled into the AVS if appropriate.      Patient or patient representative verbalized consent for the use of Ambient Listening during the visit with  LAVONNE Light for chart documentation. 6/5/2025  19:35 EDT

## 2025-06-12 ENCOUNTER — LAB (OUTPATIENT)
Dept: LAB | Facility: HOSPITAL | Age: 35
End: 2025-06-12
Payer: COMMERCIAL

## 2025-06-12 DIAGNOSIS — D50.9 IRON DEFICIENCY ANEMIA, UNSPECIFIED IRON DEFICIENCY ANEMIA TYPE: ICD-10-CM

## 2025-06-12 DIAGNOSIS — Z13.220 SCREENING FOR LIPID DISORDERS: ICD-10-CM

## 2025-06-12 DIAGNOSIS — N92.0 MENORRHAGIA WITH REGULAR CYCLE: ICD-10-CM

## 2025-06-12 DIAGNOSIS — E55.9 VITAMIN D DEFICIENCY: ICD-10-CM

## 2025-06-12 DIAGNOSIS — Z13.29 SCREENING FOR THYROID DISORDER: ICD-10-CM

## 2025-06-12 LAB
25(OH)D3 SERPL-MCNC: 33.2 NG/ML (ref 30–100)
ALBUMIN SERPL-MCNC: 4.3 G/DL (ref 3.5–5.2)
ALBUMIN/GLOB SERPL: 1.4 G/DL
ALP SERPL-CCNC: 39 U/L (ref 39–117)
ALT SERPL W P-5'-P-CCNC: 17 U/L (ref 1–33)
ANION GAP SERPL CALCULATED.3IONS-SCNC: 12 MMOL/L (ref 5–15)
AST SERPL-CCNC: 19 U/L (ref 1–32)
BASOPHILS # BLD AUTO: 0.03 10*3/MM3 (ref 0–0.2)
BASOPHILS NFR BLD AUTO: 0.4 % (ref 0–1.5)
BILIRUB SERPL-MCNC: 0.2 MG/DL (ref 0–1.2)
BUN SERPL-MCNC: 11 MG/DL (ref 6–20)
BUN/CREAT SERPL: 16.2 (ref 7–25)
CALCIUM SPEC-SCNC: 9.5 MG/DL (ref 8.6–10.5)
CHLORIDE SERPL-SCNC: 103 MMOL/L (ref 98–107)
CHOLEST SERPL-MCNC: 205 MG/DL (ref 0–200)
CO2 SERPL-SCNC: 23 MMOL/L (ref 22–29)
CREAT SERPL-MCNC: 0.68 MG/DL (ref 0.57–1)
DEPRECATED RDW RBC AUTO: 40.7 FL (ref 37–54)
EGFRCR SERPLBLD CKD-EPI 2021: 115.2 ML/MIN/1.73
EOSINOPHIL # BLD AUTO: 0.06 10*3/MM3 (ref 0–0.4)
EOSINOPHIL NFR BLD AUTO: 0.8 % (ref 0.3–6.2)
ERYTHROCYTE [DISTWIDTH] IN BLOOD BY AUTOMATED COUNT: 11.2 % (ref 12.3–15.4)
FERRITIN SERPL-MCNC: 117 NG/ML (ref 13–150)
FOLATE SERPL-MCNC: 18.6 NG/ML (ref 4.78–24.2)
GLOBULIN UR ELPH-MCNC: 3 GM/DL
GLUCOSE SERPL-MCNC: 99 MG/DL (ref 65–99)
HCT VFR BLD AUTO: 37.2 % (ref 34–46.6)
HDLC SERPL-MCNC: 49 MG/DL (ref 40–60)
HGB BLD-MCNC: 12.5 G/DL (ref 12–15.9)
IMM GRANULOCYTES # BLD AUTO: 0.02 10*3/MM3 (ref 0–0.05)
IMM GRANULOCYTES NFR BLD AUTO: 0.3 % (ref 0–0.5)
IRON 24H UR-MRATE: 106 MCG/DL (ref 37–145)
IRON SATN MFR SERPL: 29 % (ref 20–50)
LDLC SERPL CALC-MCNC: 132 MG/DL (ref 0–100)
LDLC/HDLC SERPL: 2.63 {RATIO}
LYMPHOCYTES # BLD AUTO: 2.16 10*3/MM3 (ref 0.7–3.1)
LYMPHOCYTES NFR BLD AUTO: 30.4 % (ref 19.6–45.3)
MCH RBC QN AUTO: 33.7 PG (ref 26.6–33)
MCHC RBC AUTO-ENTMCNC: 33.6 G/DL (ref 31.5–35.7)
MCV RBC AUTO: 100.3 FL (ref 79–97)
MONOCYTES # BLD AUTO: 0.67 10*3/MM3 (ref 0.1–0.9)
MONOCYTES NFR BLD AUTO: 9.4 % (ref 5–12)
NEUTROPHILS NFR BLD AUTO: 4.17 10*3/MM3 (ref 1.7–7)
NEUTROPHILS NFR BLD AUTO: 58.7 % (ref 42.7–76)
NRBC BLD AUTO-RTO: 0 /100 WBC (ref 0–0.2)
PLATELET # BLD AUTO: 290 10*3/MM3 (ref 140–450)
PMV BLD AUTO: 10.8 FL (ref 6–12)
POTASSIUM SERPL-SCNC: 3.9 MMOL/L (ref 3.5–5.2)
PROT SERPL-MCNC: 7.3 G/DL (ref 6–8.5)
RBC # BLD AUTO: 3.71 10*6/MM3 (ref 3.77–5.28)
SODIUM SERPL-SCNC: 138 MMOL/L (ref 136–145)
TIBC SERPL-MCNC: 370 MCG/DL (ref 298–536)
TRANSFERRIN SERPL-MCNC: 248 MG/DL (ref 200–360)
TRIGL SERPL-MCNC: 135 MG/DL (ref 0–150)
TSH SERPL DL<=0.05 MIU/L-ACNC: 0.98 UIU/ML (ref 0.27–4.2)
VIT B12 BLD-MCNC: 497 PG/ML (ref 211–946)
VLDLC SERPL-MCNC: 24 MG/DL (ref 5–40)
WBC NRBC COR # BLD AUTO: 7.11 10*3/MM3 (ref 3.4–10.8)

## 2025-06-12 PROCEDURE — 84466 ASSAY OF TRANSFERRIN: CPT

## 2025-06-12 PROCEDURE — 85025 COMPLETE CBC W/AUTO DIFF WBC: CPT

## 2025-06-12 PROCEDURE — 82607 VITAMIN B-12: CPT

## 2025-06-12 PROCEDURE — 83540 ASSAY OF IRON: CPT

## 2025-06-12 PROCEDURE — 82746 ASSAY OF FOLIC ACID SERUM: CPT

## 2025-06-12 PROCEDURE — 36415 COLL VENOUS BLD VENIPUNCTURE: CPT

## 2025-06-12 PROCEDURE — 82306 VITAMIN D 25 HYDROXY: CPT

## 2025-06-12 PROCEDURE — 80061 LIPID PANEL: CPT

## 2025-06-12 PROCEDURE — 80053 COMPREHEN METABOLIC PANEL: CPT

## 2025-06-12 PROCEDURE — 82728 ASSAY OF FERRITIN: CPT

## 2025-06-12 PROCEDURE — 84443 ASSAY THYROID STIM HORMONE: CPT

## 2025-06-18 ENCOUNTER — HOSPITAL ENCOUNTER (OUTPATIENT)
Dept: ULTRASOUND IMAGING | Facility: HOSPITAL | Age: 35
Discharge: HOME | End: 2025-06-18
Admitting: NURSE PRACTITIONER
Payer: COMMERCIAL

## 2025-06-18 DIAGNOSIS — N92.0 MENORRHAGIA WITH REGULAR CYCLE: ICD-10-CM

## 2025-06-18 PROCEDURE — 76830 TRANSVAGINAL US NON-OB: CPT

## 2025-07-28 NOTE — PROGRESS NOTES
GYN Visit    Chief Complaint   Patient presents with    Menstrual Problem    Menorrhagia    Dyspareunia       HPI:   35 y.o. LMP: Patient's last menstrual period was 2025 (exact date).     Social History     Substance and Sexual Activity   Sexual Activity Yes    Partners: Male    Birth control/protection: Bilateral salpingectomy        History of Present Illness  The patient is a 35-year-old female who presents today for a GYN visit. She reports experiencing heavy menstrual bleeding. The last one being in 2025 and recurred 9 days later. This issue has persisted since the birth of her child, with the last three months being particularly severe. The bleeding is so heavy that she fears leaving the house due to the risk of bleeding through her clothes. She also reports pain during her periods and worsening premenstrual syndrome (PMS) symptoms. A transvaginal ultrasound was performed, revealing cysts on her cervix, but no other abnormalities. Blood work was also conducted that was normal. She is hesitant about OCPs or IUD. Additionally, she mentions a protruding lump above c/s scar, which becomes more noticeable after work. It is not currently visible.    GYNECOLOGICAL HISTORY:  Her last menstrual period was in 2025. She experiences heavy bleeding with recurrence 9 days later. Menstrual pain is present.    CONTRACEPTION:  She has used birth control pills, which have not been effective, and she has concerns about the risk of stroke associated with them. She has no plans for more children.    OBSTETRICAL HISTORY:  Obstetrics history was discussed. She is  3, Para 2.    PAST SURGICAL HISTORY:  She has had a salpingectomy.         History: PMHx, Meds, Allergies, PSHx, Social Hx, and POBHx all reviewed and updated.  Last Completed Pap Smear    This patient has no relevant Health Maintenance data.         PHYSICAL EXAM:  /83   Pulse 105   Wt 92.1 kg (203 lb)   LMP 2025 (Exact Date)    Breastfeeding No   BMI 31.79 kg/m²   General- NAD, alert and oriented, appropriate  Psych- Normal mood  Respiratory- No labored breathing  Abdomen- Soft, non distended, non tender, no masses noted          Results  Imaging   - Transvaginal ultrasound: Showed nabothian cysts on the cervix      ASSESSMENT AND PLAN:  Diagnoses and all orders for this visit:    1. Menorrhagia with regular cycle (Primary)    2. Menses painful                Assessment & Plan  1. Menorrhagia  - Heavy and painful periods ongoing since tubal removal, increased severity in the last three months  - Transvaginal ultrasound showed cysts on the cervix, no other abnormalities  - Blood work results normal  - Discussed birth control as an option to control bleeding, concerns about stroke risk noted  - Suggested ablation procedure, discussed benefits, risks, and limitations (possibility of not lasting until menopause, persistent mood swings)  - Mentioned IUD as an option  - Provided handout on NovaSure device  - Biopsy of uterine lining to be performed prior to ablation to ensure no underlying issues  - No medications or therapies prescribed  - Recommended ablation procedure, biopsy of uterine lining before proceeding    2. Suspected hernia or scar tissue  - Physical exam findings not specified  - Discussed possibility of protrusion being due to scar tissue, hernia, or combination of both  - Advised to monitor condition closely  - Seek immediate hospital admission if protrusion becomes red, inflamed, or does not retract  - No medications or therapies prescribed  - Recommended monitoring condition and seeking hospital admission if symptoms worsen      Follow Up:  Return if symptoms worsen or fail to improve, for EMB.    Patient or patient representative verbalized consent for the use of Ambient Listening during the visit with  Kristal Torres DO for chart documentation. 7/29/2025  15:32 EDT        Kristal Torres DO  07/29/2025    [facility] SANDEEP GOVEA  [address]  [facility]  [address] [place] [address]  Dept: [phone]  Dept Fax: [phone]  Loc: [phone]

## 2025-07-29 ENCOUNTER — OFFICE VISIT (OUTPATIENT)
Dept: OBSTETRICS AND GYNECOLOGY | Age: 35
End: 2025-07-29
Payer: COMMERCIAL

## 2025-07-29 VITALS
SYSTOLIC BLOOD PRESSURE: 147 MMHG | DIASTOLIC BLOOD PRESSURE: 83 MMHG | HEART RATE: 105 BPM | WEIGHT: 203 LBS | BODY MASS INDEX: 31.79 KG/M2

## 2025-07-29 DIAGNOSIS — N92.0 MENORRHAGIA WITH REGULAR CYCLE: Primary | ICD-10-CM

## 2025-07-29 DIAGNOSIS — N94.6 MENSES PAINFUL: ICD-10-CM

## (undated) DEVICE — DEV OPN LIGASURE SM/JAW 28D 16.5MM 18.8CM 1P/U